# Patient Record
Sex: FEMALE | Race: BLACK OR AFRICAN AMERICAN | Employment: FULL TIME | URBAN - METROPOLITAN AREA
[De-identification: names, ages, dates, MRNs, and addresses within clinical notes are randomized per-mention and may not be internally consistent; named-entity substitution may affect disease eponyms.]

---

## 2017-01-25 RX ORDER — PRAVASTATIN SODIUM 40 MG/1
TABLET ORAL
Qty: 30 TAB | Refills: 0 | Status: SHIPPED | OUTPATIENT
Start: 2017-01-25 | End: 2017-02-24 | Stop reason: SDUPTHER

## 2017-02-24 RX ORDER — PRAVASTATIN SODIUM 40 MG/1
TABLET ORAL
Qty: 30 TAB | Refills: 0 | Status: SHIPPED | OUTPATIENT
Start: 2017-02-24 | End: 2017-04-04 | Stop reason: SDUPTHER

## 2017-02-28 ENCOUNTER — OFFICE VISIT (OUTPATIENT)
Dept: INTERNAL MEDICINE CLINIC | Age: 62
End: 2017-02-28

## 2017-02-28 VITALS
RESPIRATION RATE: 16 BRPM | WEIGHT: 154.2 LBS | BODY MASS INDEX: 30.27 KG/M2 | OXYGEN SATURATION: 98 % | HEIGHT: 60 IN | HEART RATE: 85 BPM | DIASTOLIC BLOOD PRESSURE: 62 MMHG | TEMPERATURE: 97.9 F | SYSTOLIC BLOOD PRESSURE: 100 MMHG

## 2017-02-28 DIAGNOSIS — M25.511 ACUTE PAIN OF RIGHT SHOULDER: ICD-10-CM

## 2017-02-28 DIAGNOSIS — L60.0 INGROWN NAIL OF GREAT TOE OF LEFT FOOT: ICD-10-CM

## 2017-02-28 DIAGNOSIS — L30.9 ECZEMA OF FACE: Primary | ICD-10-CM

## 2017-02-28 DIAGNOSIS — R35.0 URINARY FREQUENCY: ICD-10-CM

## 2017-02-28 DIAGNOSIS — Z23 ENCOUNTER FOR IMMUNIZATION: ICD-10-CM

## 2017-02-28 LAB
BILIRUB UR QL STRIP: NEGATIVE
GLUCOSE UR-MCNC: NEGATIVE MG/DL
KETONES P FAST UR STRIP-MCNC: NEGATIVE MG/DL
PH UR STRIP: 6 [PH] (ref 4.6–8)
PROT UR QL STRIP: NEGATIVE MG/DL
SP GR UR STRIP: 1.03 (ref 1–1.03)
UA UROBILINOGEN AMB POC: NORMAL (ref 0.2–1)
URINALYSIS CLARITY POC: CLEAR
URINALYSIS COLOR POC: YELLOW
URINE BLOOD POC: NEGATIVE
URINE LEUKOCYTES POC: NORMAL
URINE NITRITES POC: NEGATIVE

## 2017-02-28 RX ORDER — DESONIDE 0.5 MG/G
OINTMENT TOPICAL 2 TIMES DAILY
Qty: 15 G | Refills: 0 | Status: SHIPPED | OUTPATIENT
Start: 2017-02-28 | End: 2017-08-19 | Stop reason: SDUPTHER

## 2017-02-28 NOTE — PROGRESS NOTES
Chief Complaint   Patient presents with   Judieth Azalea Other     has a problem with toes having pain shooting thru them. states that it has been going on for about a month. has a breakout on the face   and pain in shoulders that changes from one to the other. Needs labs and has a urinary frequency.

## 2017-02-28 NOTE — MR AVS SNAPSHOT
Visit Information Date & Time Provider Department Dept. Phone Encounter #  
 2/28/2017 11:00 AM Yovani Kennedy MD [facility] Internal Medicine [phone] [id number] Upcoming Health Maintenance Date Due Hepatitis C Screening 1955 EYE EXAM RETINAL OR DILATED Q1 9/12/1966 Pneumococcal 19-64 Medium Risk (1 of 1 - PPSV23) 9/12/1975 DTaP/Tdap/Td series (1 - Tdap) 9/12/1977 PAP AKA CERVICAL CYTOLOGY 11/4/2014 ZOSTER VACCINE AGE 60> 9/12/2016 HEMOGLOBIN A1C Q6M 4/11/2017 FOOT EXAM Q1 10/11/2017 LIPID PANEL Q1 10/11/2017 MICROALBUMIN Q1 10/21/2017 COLONOSCOPY 4/22/2018 BREAST CANCER SCRN MAMMOGRAM 11/23/2018 Allergies as of 2/28/2017  Review Complete On: 2/28/2017 By: Yovani Kennedy MD  
 No Known Allergies Current Immunizations  Reviewed on 2/28/2017 Name Date Influenza Vaccine 10/21/2015 Influenza Vaccine (Quad) PF 2/28/2017 Reviewed by Jose Gomez LPN on 1/75/6799 at 73:40 AM  
You Were Diagnosed With   
  
 Codes Comments Eczema of face    -  Primary ICD-10-CM: L30.9 ICD-9-CM: 692.9 Encounter for immunization     ICD-10-CM: E99 ICD-9-CM: V03.89 Urinary frequency     ICD-10-CM: R35.0 ICD-9-CM: 788.41 Ingrown nail of great toe of left foot     ICD-10-CM: L60.0 ICD-9-CM: 703.0 Acute pain of right shoulder     ICD-10-CM: M25.511 ICD-9-CM: 719.41 Vitals BP  
  
  
  
  
  
 100/62 (BP 1 Location: Left arm, BP Patient Position: Sitting) BMI and BSA Data Body Mass Index Body Surface Area  
 30.12 kg/m 2 1.72 m 2 Preferred Pharmacy Pharmacy Name Phone [facility] [phone] [facility] [phone] Your Updated Medication List  
  
   
This list is accurate as of: 2/28/17 11:52 AM.  Always use your most recent med list.  
  
  
  
  
 Aspirin EC 81 mg tablet Generic drug:  aspirin delayed-release Take 81 mg by mouth daily. Blood-Glucose Meter monitoring kit Check blood sugar 3 times a day. Cholecalciferol (Vitamin D3) 2,000 unit Cap capsule Commonly known as:  VITAMIN D3  
1 tablet daily. desonide 0.05 % topical ointment Commonly known as:  Everlina Mons Apply  to affected area two (2) times a day. glucose blood VI test strips strip Commonly known as:  RELION PRIME TEST STRIPS Use to test blood sugar three times a day. E11.9  
  
 levothyroxine 88 mcg tablet Commonly known as:  SYNTHROID Take 1 Tab by mouth Daily (before breakfast). metFORMIN 500 mg tablet Commonly known as:  GLUCOPHAGE  
TAKE ONE TABLET BY MOUTH TWICE DAILY WITH MEALS  
  
 pravastatin 40 mg tablet Commonly known as:  PRAVACHOL  
TAKE ONE TABLET BY MOUTH ONCE NIGHTLY Prescriptions Sent to Pharmacy Refills  
 desonide (DESOWEN) 0.05 % topical ointment 0 Sig: Apply  to affected area two (2) times a day. Class: Normal  
 Pharmacy: [facility] [place] [address] Ph #: [phone] Route: Topical  
  
We Performed the Following AMB POC URINALYSIS DIP STICK AUTO W/ MICRO [04550 CPT(R)] CULTURE, URINE [id number] CPT(R)] INFLUENZA VIRUS VAC QUAD,SPLIT,PRESV FREE SYRINGE 3/> YRS IM [id number] CPT(R)] REFERRAL TO PODIATRY [REF90 Custom] Comments:  
 Please evaluate patient for ingrowing toe nail. Referral Information Referral ID Referred By Referred To [id number] [address] Visits Status Start Date End Date 1 New Request 2/28/17 2/28/18 If your referral has a status of pending review or denied, additional information will be sent to support the outcome of this decision. Introducing [facility]! Dear Rosita Oconnor: Thank you for requesting a [employer] account.   Our records indicate that you already have an active Face.com account. You can access your account anytime at [email or web address] Did you know that you can access your hospital and ER discharge instructions at any time in Face.com? You can also review all of your test results from your hospital stay or ER visit. Additional Information If you have questions, please visit the Frequently Asked Questions section of the Face.com website at [email or web address] Remember, Face.com is NOT to be used for urgent needs. For medical emergencies, dial 911. Now available from your iPhone and Android! Please provide this summary of care documentation to your next provider. Your primary care clinician is listed as Du Loser. If you have any questions after today's visit, please call [phone].

## 2017-02-28 NOTE — PROGRESS NOTES
Written by Christiano Royal, as dictated by Dr. Lavon Fnoseca MD.    Demarco Hartmann is a 62 y.o. female. HPI  The patient comes in today C/O her face breaking out. She has some rash on her face and she has tried Eucerin for this and it did not help. She has an ingrown toenail on her L greater toe. She has not followed with a podiatrist and it gives her a lot of pain especially at night. She C/O R shoulder pain and she does a lot of lifting with work. She has been using lidocaine cream on her shoulders for pain and it did help her a lot. She has taken Zhen body and back at this time which is also helping her. 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 Her job is good and the stress associated with her job is now gone. Her BP is good today at 100/62. She has not gotten a flu shot yet, and would like a flu shot today. She has urinary odor at this time. She denies any burning sensation with urination. She is not drinking a lot of water at this time. Patient Active Problem List   Diagnosis Code    Hypothyroidism E03.9    Anemia D64.9    Hyperlipidemia E78.5    Left knee pain M25.562    DUB (dysfunctional uterine bleeding) N93.8    [facility] E11.9        Current Outpatient Prescriptions on File Prior to Visit   Medication Sig Dispense Refill    pravastatin (PRAVACHOL) 40 mg tablet TAKE ONE TABLET BY MOUTH ONCE NIGHTLY 30 Tab 0    Cholecalciferol, Vitamin D3, (VITAMIN D3) 2,000 unit cap capsule 1 tablet daily. 90 Cap 0    levothyroxine (SYNTHROID) 88 mcg tablet Take 1 Tab by mouth Daily (before breakfast). 90 Tab 1    metFORMIN (GLUCOPHAGE) 500 mg tablet TAKE ONE TABLET BY MOUTH TWICE DAILY WITH MEALS 60 Tab 3    glucose blood VI test strips (RELION PRIME TEST STRIPS) strip Use to test blood sugar three times a day. E11.9 100 Strip 5    Blood-Glucose Meter monitoring kit Check blood sugar 3 times a day. 1 Kit 0    hydrocortisone 0.5 % lotion Apply  to affected area two (2) times a day. SPARINGLY apply to affected area 1 Bottle 5    aspirin delayed-release (ASPIRIN EC) 81 mg tablet Take 81 mg by mouth daily. No current facility-administered medications on file prior to visit. No Known Allergies    Past Medical History:   Diagnosis Date    Anemia     High cholesterol     Thyroid disease     tyroid nodule removed 1998       Past Surgical History:   Procedure Laterality Date    HX HEENT      THYROIDECTOMY         Family History   Problem Relation Age of Onset    Diabetes Mother     Hypertension Mother     High Cholesterol Mother     Diabetes Sister     Hypertension Sister     Stroke Sister     Diabetes Brother     Hypertension Brother     Diabetes Maternal Grandmother     Heart Disease Maternal Grandmother        Social History     Social History    Marital status: SINGLE     Spouse name: N/A    Number of children: N/A    Years of education: N/A     Occupational History    Not on file.      Social History Main Topics    Smoking status: Current Every Day Smoker     Packs/day: 0.50     Years: 30.00     Types: Cigarettes    Smokeless tobacco: Never Used      Comment: pt smokes 1/2 pack a day    Alcohol use No    Drug use: No    Sexual activity: Yes     Other Topics Concern    Not on file     Social History Narrative       Office Visit on 02/28/2017   Component Date Value Ref Range Status    Color (UA POC) 02/28/2017 Yellow   Final    Clarity (UA POC) 02/28/2017 Clear   Final    Glucose (UA POC) 02/28/2017 Negative  Negative Final    Bilirubin (UA POC) 02/28/2017 Negative  Negative Final    Ketones (UA POC) 02/28/2017 Negative  Negative Final    Specific gravity (UA POC) 02/28/2017 1.030  1.001 - 1.035 Final    Blood (UA POC) 02/28/2017 Negative  Negative Final    pH (UA POC) 02/28/2017 6.0  4.6 - 8.0 Final    Protein (UA POC) 02/28/2017 Negative  Negative mg/dL Final    Urobilinogen (UA POC) 02/28/2017 0.2 mg/dL  0.2 - 1 Final    Nitrites (UA POC) 02/28/2017 Negative  Negative Final    Leukocyte esterase (UA POC) 02/28/2017 1+  Negative Final       Review of Systems   Constitutional: Negative for malaise/fatigue. HENT: Negative for congestion. Respiratory: Negative for cough and wheezing. Cardiovascular: Negative for chest pain and palpitations. Genitourinary: Positive for frequency. Negative for urgency. Musculoskeletal: Positive for joint pain. Negative for myalgias. Skin: Positive for rash. Neurological: Negative for weakness and headaches. Visit Vitals    /62 (BP 1 Location: Left arm, BP Patient Position: Sitting)    Pulse 85    Temp 97.9 °F (36.6 °C) (Oral)    Resp 16    Ht 5' (1.524 m)    Wt 154 lb 3.2 oz (69.9 kg)    LMP 12/04/2012    SpO2 98%    BMI 30.12 kg/m2     Physical Exam   Constitutional: She is oriented to person, place, and time. She appears well-nourished. No distress. HENT:   Right Ear: External ear normal.   Left Ear: External ear normal.   Mouth/Throat: Oropharynx is clear and moist.   Eyes: Conjunctivae and EOM are normal. Right eye exhibits no discharge. Left eye exhibits no discharge. Neck: Normal range of motion. Neck supple. Cardiovascular: Normal rate and regular rhythm. Pulmonary/Chest: Effort normal and breath sounds normal. She has no wheezes. Abdominal: Soft. Bowel sounds are normal. She exhibits no distension. Musculoskeletal: Normal range of motion. She exhibits tenderness. She exhibits no edema. Lymphadenopathy:     She has no cervical adenopathy. Neurological: She is alert and oriented to person, place, and time. Skin: Skin is intact. Rash noted. Dry, raised area on her L cheek and R jaw line   Psychiatric: She has a normal mood and affect. Nursing note and vitals reviewed.       ASSESSMENT and PLAN    ICD-10-CM ICD-9-CM    1. Eczema of face L30.9 692.9 desonide (DESOWEN) 0.05 % topical ointment sent to pharmacy. I discussed that I will give her desonide cream for the eczema of her face. 2. Encounter for immunization Z23 V03.89 INFLUENZA VIRUS VAC QUAD,SPLIT,PRESV FREE SYRINGE 3/> YRS IM   3. Urinary frequency R35.0 788.41 AMB POC URINALYSIS DIP STICK AUTO W/ MICRO      CULTURE, URINE    Her UA shows 1+ leukocytes. I am going to send out her culture and I want her to drink 7-8 glasses of water and a few glasses of cranberry juice. 4. Ingrown nail of great toe of left foot L60.0 703.0 REFERRAL TO PODIATRY   5. Acute pain of right shoulder M25.511 719.41 I told her to continue using the lidocaine cream for her pain and she can alternate aleeve with the myron. This plan was reviewed with the patient and patient agrees. All questions were answered. This scribe documentation was reviewed by me and accurately reflects the examination and decisions made by me. This note will not be viewable in [address]

## 2017-03-24 RX ORDER — INSULIN PUMP SYRINGE, 3 ML
EACH MISCELLANEOUS
Qty: 1 KIT | Refills: 0 | Status: SHIPPED | OUTPATIENT
Start: 2017-03-24 | End: 2017-03-27 | Stop reason: SDUPTHER

## 2017-03-27 RX ORDER — INSULIN PUMP SYRINGE, 3 ML
EACH MISCELLANEOUS
Qty: 1 KIT | Refills: 0 | Status: SHIPPED | OUTPATIENT
Start: 2017-03-27

## 2017-03-27 RX ORDER — LANCETS
EACH MISCELLANEOUS
Qty: 200 EACH | Refills: 11 | Status: SHIPPED | OUTPATIENT
Start: 2017-03-27

## 2017-03-28 ENCOUNTER — OFFICE VISIT (OUTPATIENT)
Dept: INTERNAL MEDICINE CLINIC | Age: 62
End: 2017-03-28

## 2017-03-28 VITALS
HEIGHT: 60 IN | SYSTOLIC BLOOD PRESSURE: 120 MMHG | OXYGEN SATURATION: 92 % | DIASTOLIC BLOOD PRESSURE: 82 MMHG | WEIGHT: 153.8 LBS | BODY MASS INDEX: 30.19 KG/M2 | HEART RATE: 86 BPM | TEMPERATURE: 98.5 F | RESPIRATION RATE: 16 BRPM

## 2017-03-28 DIAGNOSIS — Z13.9 SCREENING: ICD-10-CM

## 2017-03-28 DIAGNOSIS — E78.2 MIXED HYPERLIPIDEMIA: ICD-10-CM

## 2017-03-28 DIAGNOSIS — L30.9 ECZEMA OF FACE: ICD-10-CM

## 2017-03-28 DIAGNOSIS — E03.9 ACQUIRED HYPOTHYROIDISM: ICD-10-CM

## 2017-03-28 DIAGNOSIS — E13.9 DIABETES MELLITUS OF OTHER TYPE WITHOUT COMPLICATION: Primary | ICD-10-CM

## 2017-03-28 NOTE — MR AVS SNAPSHOT
Visit Information Date & Time Provider Department Dept. Phone Encounter #  
 3/28/2017 11:30 AM Wendi Dorman, [address] [facility],[address] Internal Medicine [phone] [id number] Upcoming Health Maintenance Date Due  
 EYE EXAM RETINAL OR DILATED Q1 9/12/1966 Pneumococcal 19-64 Medium Risk (1 of 1 - PPSV23) 9/12/1975 DTaP/Tdap/Td series (1 - Tdap) 9/12/1977 PAP AKA CERVICAL CYTOLOGY 11/4/2014 HEMOGLOBIN A1C Q6M 4/11/2017 FOOT EXAM Q1 10/11/2017 LIPID PANEL Q1 10/11/2017 MICROALBUMIN Q1 10/21/2017 COLONOSCOPY 4/22/2018 BREAST CANCER SCRN MAMMOGRAM 11/23/2018 Allergies as of 3/28/2017  Review Complete On: 3/28/2017 By: Wendi Dorman MD  
 No Known Allergies Current Immunizations  Reviewed on 2/28/2017 Name Date Influenza Vaccine 10/21/2015 Influenza Vaccine (Quad) PF 2/28/2017 Not reviewed this visit You Were Diagnosed With   
  
 Codes Comments Diabetes mellitus of other type without complication ([facility]    -  Primary ICD-10-CM: E13.9 Acquired hypothyroidism     ICD-10-CM: E03.9 ICD-9-CM: 244.9 Screening     ICD-10-CM: Z13.9 ICD-9-CM: V82.9 Mixed hyperlipidemia     ICD-10-CM: E78.2 ICD-9-CM: 272.2 Eczema of face     ICD-10-CM: L30.9 ICD-9-CM: 692.9 Vitals BP Pulse Temp Resp Height(growth percentile) Weight(growth percentile) 120/82 (BP 1 Location: Left arm, BP Patient Position: Sitting) 86 98.5 °F (36.9 °C) (Oral) 16 5' (1.524 m) 153 lb 12.8 oz (69.8 kg) LMP SpO2 BMI OB Status Smoking Status 12/04/2012 92% 30.04 kg/m2 Postmenopausal Current Every Day Smoker BMI and BSA Data Body Mass Index Body Surface Area 30.04 kg/m 2 1.72 m 2 Preferred Pharmacy Pharmacy Name Phone [facility] [phone] [facility] [phone] Your Updated Medication List  
  
   
This list is accurate as of: 3/28/17 12:10 PM.  Always use your most recent med list.  
  
  
  
  
 Aspirin EC 81 mg tablet Generic drug:  aspirin delayed-release Take 81 mg by mouth daily. Blood-Glucose Meter monitoring kit Please use four times a day and as needed. Dx. E11.9 Cholecalciferol (Vitamin D3) 2,000 unit Cap capsule Commonly known as:  VITAMIN D3  
1 tablet daily. desonide 0.05 % topical ointment Commonly known as:  Viona Libman Apply  to affected area two (2) times a day. * glucose blood VI test strips strip Commonly known as:  RELION PRIME TEST STRIPS Use to test blood sugar three times a day. E11.9  
  
 * glucose blood VI test strips strip Commonly known as:  blood glucose test  
Use to test blood sugars 3 times a day and as needed DX. E11.9 Lancets Misc Use to check blood sugars 3 times daily and as needed  
  
 levothyroxine 88 mcg tablet Commonly known as:  SYNTHROID Take 1 Tab by mouth Daily (before breakfast). metFORMIN 500 mg tablet Commonly known as:  GLUCOPHAGE  
TAKE ONE TABLET BY MOUTH TWICE DAILY WITH MEALS  
  
 pravastatin 40 mg tablet Commonly known as:  PRAVACHOL  
TAKE ONE TABLET BY MOUTH ONCE NIGHTLY * Notice: This list has 2 medication(s) that are the same as other medications prescribed for you. Read the directions carefully, and ask your doctor or other care provider to review them with you. We Performed the Following HEMOGLOBIN A1C WITH EAG [20044 CPT(R)] HEPATITIS C AB [96270 CPT(R)] LIPID PANEL [74069 CPT(R)] TSH 3RD GENERATION [21705 CPT(R)] [facility]! Dear Gladys Prakash: Thank you for requesting a [employer] account. Our records indicate that you already have an active [employer] account. You can access your account anytime at [email or web address] Did you know that you can access your hospital and ER discharge instructions at any time in [employer]? You can also review all of your test results from your hospital stay or ER visit. Additional Information If you have questions, please visit the Frequently Asked Questions section of the [employer] website at [email or web address] Remember, Dragon[employer] is NOT to be used for urgent needs. For medical emergencies, dial 911. Now available from your iPhone and Android! Please provide this summary of care documentation to your next provider. Your primary care clinician is listed as Rolando Edwards. If you have any questions after today's visit, please call [phone].

## 2017-03-28 NOTE — PROGRESS NOTES
Written by Justice Jang, as dictated by Dr. Michelle Wilhelm MD.    Heriberto Palmer is a 62 y.o. female. HPI  The patient comes in today for a follow up. She uses the Desonide cream on her eczema rash on her face and it is working very well. She is taking metformin daily. She is taking 88 mcg of synthroid at this time and she thinks it is working well. She is not feeling sluggish or fatigued. She is compliant on pravastatin, and she denies any leg cramps or pain. She needs to follow with the ophthalmologist, and she just got coverage on her vision insurance, so she will make an appointment. Patient Active Problem List   Diagnosis Code    Hypothyroidism E03.9    Anemia D64.9    Hyperlipidemia E78.5    Left knee pain M25.562    DUB (dysfunctional uterine bleeding) N93.8    Diabetes mellitus ([facility] E11.9        Current Outpatient Prescriptions on File Prior to Visit   Medication Sig Dispense Refill    Blood-Glucose Meter monitoring kit Please use four times a day and as needed. Dx. E11.9 1 Kit 0    glucose blood VI test strips (BLOOD GLUCOSE TEST) strip Use to test blood sugars 3 times a day and as needed DX. E11.9 100 Strip 6    Lancets misc Use to check blood sugars 3 times daily and as needed 200 Each 11    desonide (DESOWEN) 0.05 % topical ointment Apply  to affected area two (2) times a day. 15 g 0    pravastatin (PRAVACHOL) 40 mg tablet TAKE ONE TABLET BY MOUTH ONCE NIGHTLY 30 Tab 0    Cholecalciferol, Vitamin D3, (VITAMIN D3) 2,000 unit cap capsule 1 tablet daily. 90 Cap 0    levothyroxine (SYNTHROID) 88 mcg tablet Take 1 Tab by mouth Daily (before breakfast). 90 Tab 1    metFORMIN (GLUCOPHAGE) 500 mg tablet TAKE ONE TABLET BY MOUTH TWICE DAILY WITH MEALS 60 Tab 3    aspirin delayed-release (ASPIRIN EC) 81 mg tablet Take 81 mg by mouth daily.       glucose blood VI test strips (RELION PRIME TEST STRIPS) strip Use to test blood sugar three times a day. E11.9 100 Strip 5     No current facility-administered medications on file prior to visit. No Known Allergies    Past Medical History:   Diagnosis Date    Anemia     High cholesterol     Thyroid disease     tyroid nodule removed 1998       Past Surgical History:   Procedure Laterality Date    HX HEENT      THYROIDECTOMY         Family History   Problem Relation Age of Onset    Diabetes Mother     Hypertension Mother     High Cholesterol Mother     Diabetes Sister     Hypertension Sister     Stroke Sister     Diabetes Brother     Hypertension Brother     Diabetes Maternal Grandmother     Heart Disease Maternal Grandmother        Social History     Social History    Marital status: SINGLE     Spouse name: N/A    Number of children: N/A    Years of education: N/A     Occupational History    Not on file.      Social History Main Topics    Smoking status: Current Every Day Smoker     Packs/day: 0.50     Years: 30.00     Types: Cigarettes    Smokeless tobacco: Never Used      Comment: pt smokes 1/2 pack a day    Alcohol use No    Drug use: No    Sexual activity: Yes     Other Topics Concern    Not on file     Social History Narrative       Office Visit on 02/28/2017   Component Date Value Ref Range Status    Color (UA POC) 02/28/2017 Yellow   Final    Clarity (UA POC) 02/28/2017 Clear   Final    Glucose (UA POC) 02/28/2017 Negative  Negative Final    Bilirubin (UA POC) 02/28/2017 Negative  Negative Final    Ketones (UA POC) 02/28/2017 Negative  Negative Final    Specific gravity (UA POC) 02/28/2017 1.030  1.001 - 1.035 Final    Blood (UA POC) 02/28/2017 Negative  Negative Final    pH (UA POC) 02/28/2017 6.0  4.6 - 8.0 Final    Protein (UA POC) 02/28/2017 Negative  Negative mg/dL Final    Urobilinogen (UA POC) 02/28/2017 0.2 mg/dL  0.2 - 1 Final    Nitrites (UA POC) 02/28/2017 Negative  Negative Final    Leukocyte esterase (UA POC) 02/28/2017 1+  Negative Final       Review of Systems   Constitutional: Negative for malaise/fatigue. Respiratory: Negative for cough and wheezing. Cardiovascular: Negative for chest pain and palpitations. Musculoskeletal: Negative for joint pain and myalgias. Skin: Positive for rash. Neurological: Negative for weakness and headaches. Visit Vitals    /82 (BP 1 Location: Left arm, BP Patient Position: Sitting)    Pulse 86    Temp 98.5 °F (36.9 °C) (Oral)    Resp 16    Ht 5' (1.524 m)    Wt 153 lb 12.8 oz (69.8 kg)    LMP 12/04/2012    SpO2 92%    BMI 30.04 kg/m2     Physical Exam   Constitutional: She is oriented to person, place, and time. She appears well-nourished. No distress. HENT:   Right Ear: External ear normal.   Left Ear: External ear normal.   Mouth/Throat: Oropharynx is clear and moist.   Eyes: Conjunctivae and EOM are normal. Right eye exhibits no discharge. Left eye exhibits no discharge. Neck: Normal range of motion. Neck supple. Cardiovascular: Normal rate and regular rhythm. Pulmonary/Chest: Effort normal and breath sounds normal.   Abdominal: Soft. Bowel sounds are normal.   Neurological: She is alert and oriented to person, place, and time. Skin: Skin is intact. Psychiatric: She has a normal mood and affect. Nursing note and vitals reviewed. ASSESSMENT and PLAN    ICD-10-CM ICD-9-CM    1. Diabetes mellitus of other type without complication ([facility] P52.6  HEMOGLOBIN A1C WITH EAG    I discussed that we will check her A1c and I want her to follow with the ophthalmologist to have a diabetic eye exam.   2. Acquired hypothyroidism E03.9 244.9 TSH 3RD GENERATION    I increased her synthroid on her last visit, so I will check her TSH levels. 3. Screening Z13.9 V82.9 HEPATITIS C AB   4. Mixed hyperlipidemia E78.2 272.2 LIPID PANEL   5. Eczema of face L30.9 692.9 The desonide cream is working well for her, so she can use this as needed.        This plan was reviewed with the patient and patient agrees. All questions were answered. This scribe documentation was reviewed by me and accurately reflects the examination and decisions made by me. This note will not be viewable in [address]

## 2017-03-29 LAB
CHOLEST SERPL-MCNC: 165 MG/DL (ref 100–199)
EST. AVERAGE GLUCOSE BLD GHB EST-MCNC: 131 MG/DL
HBA1C MFR BLD: 6.2 % (ref 4.8–5.6)
HCV AB S/CO SERPL IA: 0.2 S/CO RATIO (ref 0–0.9)
HDLC SERPL-MCNC: 66 MG/DL
INTERPRETATION, 910389: NORMAL
LDLC SERPL CALC-MCNC: 74 MG/DL (ref 0–99)
TRIGL SERPL-MCNC: 124 MG/DL (ref 0–149)
TSH SERPL DL<=0.005 MIU/L-ACNC: 0.06 UIU/ML (ref 0.45–4.5)
VLDLC SERPL CALC-MCNC: 25 MG/DL (ref 5–40)

## 2017-03-30 NOTE — PROGRESS NOTES
Her HbA1C is improving. Keep up the good work! TSH is still little low. Will repeat in 3 months. Continue same dose for now.

## 2017-04-04 RX ORDER — METFORMIN HYDROCHLORIDE 500 MG/1
TABLET ORAL
Qty: 60 TAB | Refills: 0 | Status: SHIPPED | OUTPATIENT
Start: 2017-04-04 | End: 2017-05-31 | Stop reason: SDUPTHER

## 2017-04-04 RX ORDER — PRAVASTATIN SODIUM 40 MG/1
TABLET ORAL
Qty: 30 TAB | Refills: 0 | Status: SHIPPED | OUTPATIENT
Start: 2017-04-04 | End: 2017-05-05 | Stop reason: SDUPTHER

## 2017-05-31 RX ORDER — METFORMIN HYDROCHLORIDE 500 MG/1
TABLET ORAL
Qty: 60 TAB | Refills: 0 | Status: SHIPPED | OUTPATIENT
Start: 2017-05-31 | End: 2017-07-18 | Stop reason: SDUPTHER

## 2017-07-19 RX ORDER — METFORMIN HYDROCHLORIDE 500 MG/1
TABLET ORAL
Qty: 60 TAB | Refills: 0 | Status: SHIPPED | OUTPATIENT
Start: 2017-07-19 | End: 2017-09-06 | Stop reason: SDUPTHER

## 2017-08-17 DIAGNOSIS — E03.9 ACQUIRED HYPOTHYROIDISM: ICD-10-CM

## 2017-08-17 RX ORDER — PRAVASTATIN SODIUM 40 MG/1
TABLET ORAL
Qty: 90 TAB | Refills: 0 | Status: SHIPPED | OUTPATIENT
Start: 2017-08-17 | End: 2017-12-04 | Stop reason: SDUPTHER

## 2017-08-17 RX ORDER — LEVOTHYROXINE SODIUM 88 UG/1
TABLET ORAL
Qty: 90 TAB | Refills: 0 | Status: SHIPPED | OUTPATIENT
Start: 2017-08-17 | End: 2018-03-15 | Stop reason: SDUPTHER

## 2017-08-19 DIAGNOSIS — L30.9 ECZEMA OF FACE: ICD-10-CM

## 2017-08-20 RX ORDER — DESONIDE 0.5 MG/G
OINTMENT TOPICAL
Qty: 1 TUBE | Refills: 1 | Status: SHIPPED | OUTPATIENT
Start: 2017-08-20 | End: 2017-11-18

## 2017-09-08 RX ORDER — METFORMIN HYDROCHLORIDE 500 MG/1
TABLET ORAL
Qty: 60 TAB | Refills: 0 | Status: SHIPPED | OUTPATIENT
Start: 2017-09-08 | End: 2017-11-05 | Stop reason: SDUPTHER

## 2017-11-03 DIAGNOSIS — R79.89 ELEVATED TSH: ICD-10-CM

## 2017-11-03 DIAGNOSIS — L75.0 URINARY BODY ODOR: ICD-10-CM

## 2017-11-03 DIAGNOSIS — R73.9 ELEVATED BLOOD SUGAR: Primary | ICD-10-CM

## 2017-11-03 LAB
BILIRUB UR QL STRIP: NEGATIVE
GLUCOSE UR-MCNC: NEGATIVE MG/DL
KETONES P FAST UR STRIP-MCNC: NEGATIVE MG/DL
PH UR STRIP: 5.5 [PH] (ref 4.6–8)
PROT UR QL STRIP: NEGATIVE MG/DL
SP GR UR STRIP: 1.02 (ref 1–1.03)
UA UROBILINOGEN AMB POC: NORMAL (ref 0.2–1)
URINALYSIS CLARITY POC: NORMAL
URINALYSIS COLOR POC: YELLOW
URINE BLOOD POC: NORMAL
URINE LEUKOCYTES POC: NORMAL
URINE NITRITES POC: NEGATIVE

## 2017-11-04 LAB
BACTERIA UR CULT: NO GROWTH
EST. AVERAGE GLUCOSE BLD GHB EST-MCNC: 131 MG/DL
HBA1C MFR BLD: 6.2 % (ref 4.8–5.6)
TSH SERPL DL<=0.005 MIU/L-ACNC: 0.01 UIU/ML (ref 0.45–4.5)

## 2017-11-06 DIAGNOSIS — E03.9 ACQUIRED HYPOTHYROIDISM: Primary | ICD-10-CM

## 2017-11-06 RX ORDER — LEVOTHYROXINE SODIUM 75 UG/1
75 TABLET ORAL
Qty: 60 TAB | Refills: 0 | Status: SHIPPED | OUTPATIENT
Start: 2017-11-06 | End: 2017-12-28 | Stop reason: SDUPTHER

## 2017-11-06 RX ORDER — METFORMIN HYDROCHLORIDE 500 MG/1
TABLET ORAL
Qty: 60 TAB | Refills: 0 | Status: SHIPPED | OUTPATIENT
Start: 2017-11-06 | End: 2017-12-19 | Stop reason: SDUPTHER

## 2017-12-04 RX ORDER — PRAVASTATIN SODIUM 40 MG/1
TABLET ORAL
Qty: 30 TAB | Refills: 0 | Status: SHIPPED | OUTPATIENT
Start: 2017-12-04 | End: 2017-12-28 | Stop reason: SDUPTHER

## 2017-12-04 NOTE — TELEPHONE ENCOUNTER
Last refill 8/17/17  Last visit 3/28/17  Appears pt has missed two scheduled appointment (11/3/17 and 11/9/17)  Placed refill for only a 30 day supply bc pt needs to have an appt to repeat Lipid prior to next refill.

## 2017-12-04 NOTE — TELEPHONE ENCOUNTER
Pt says that she has been waiting for two weeks?   This writer believes we might be having CC issues

## 2018-03-12 ENCOUNTER — OFFICE VISIT (OUTPATIENT)
Dept: INTERNAL MEDICINE CLINIC | Age: 63
End: 2018-03-12

## 2018-03-12 VITALS
DIASTOLIC BLOOD PRESSURE: 90 MMHG | RESPIRATION RATE: 16 BRPM | OXYGEN SATURATION: 93 % | HEART RATE: 95 BPM | HEIGHT: 60 IN | TEMPERATURE: 98 F | WEIGHT: 156 LBS | BODY MASS INDEX: 30.63 KG/M2 | SYSTOLIC BLOOD PRESSURE: 145 MMHG

## 2018-03-12 DIAGNOSIS — E03.9 ACQUIRED HYPOTHYROIDISM: ICD-10-CM

## 2018-03-12 DIAGNOSIS — E13.9 OTHER SPECIFIED DIABETES MELLITUS WITHOUT COMPLICATION, WITHOUT LONG-TERM CURRENT USE OF INSULIN (HCC): ICD-10-CM

## 2018-03-12 DIAGNOSIS — M79.89 LEG SWELLING: ICD-10-CM

## 2018-03-12 DIAGNOSIS — I10 ESSENTIAL HYPERTENSION: Primary | ICD-10-CM

## 2018-03-12 DIAGNOSIS — N89.8 VAGINAL DISCHARGE: ICD-10-CM

## 2018-03-12 NOTE — PROGRESS NOTES
Jessie Pichardo is a 65 y.o. female  Chief Complaint   Patient presents with    Leg Swelling     left side midshin area 3-4 days some dizziness    Vaginal Itching     very little discharge for about 3 weeks    Breast pain     moves around to shoulder blade left side only radiates down side pain comes and goes     Visit Vitals    /90 (BP 1 Location: Left arm, BP Patient Position: Sitting)    Pulse 95    Temp 98 °F (36.7 °C)    Resp 16    Ht 5' (1.524 m)    Wt 156 lb (70.8 kg)    LMP 12/04/2012    SpO2 93%    BMI 30.47 kg/m2         Taking care of sister now. 1. Have you been to the ER, urgent care clinic since your last visit? Hospitalized since your last visit? no    2. Have you seen or consulted any other health care providers outside of the [address] [place] [address] since your last visit? Include any pap smears or colon screening.  no

## 2018-03-12 NOTE — MR AVS SNAPSHOT
[address] [facility] [address] [place] [address] [facility] 
[phone] Patient: Nakita Peacock MRN: [id number] Visit Information Date & Time Provider Department Dept. Phone Encounter #  
 3/12/2018 12:15 PM Soraya Quarles MD [facility] [phone] [id number] Upcoming Health Maintenance Date Due  
 EYE EXAM RETINAL OR DILATED Q1 9/12/1966 Pneumococcal 19-64 Medium Risk (1 of 1 - PPSV23) 9/12/1975 DTaP/Tdap/Td series (1 - Tdap) 9/12/1977 PAP AKA CERVICAL CYTOLOGY 11/4/2014 FOOT EXAM Q1 10/11/2017 MICROALBUMIN Q1 10/21/2017 LIPID PANEL Q1 3/28/2018 COLONOSCOPY 4/22/2018 HEMOGLOBIN A1C Q6M 5/3/2018 BREAST CANCER SCRN MAMMOGRAM 11/23/2018 Allergies as of 3/12/2018  Review Complete On: 3/12/2018 By: Soraya Quarles MD  
 No Known Allergies Current Immunizations  Reviewed on 2/28/2017 Name Date Influenza Vaccine 10/21/2015 Influenza Vaccine (Quad) PF 2/28/2017 Not reviewed this visit You Were Diagnosed With   
  
 Codes Comments Essential hypertension    -  Primary ICD-10-CM: I10 
ICD-9-CM: 401.9 Acquired hypothyroidism     ICD-10-CM: E03.9 ICD-9-CM: 244.9 Leg swelling     ICD-10-CM: M79.89 ICD-9-CM: 729.81 Other specified diabetes mellitus without complication, without long-term current use of insulin ([facility]     ICD-10-CM: E13.9 ICD-9-CM: 250.00 Vaginal discharge     ICD-10-CM: N89.8 ICD-9-CM: 623.5 Vitals BP Pulse Temp Resp Height(growth percentile) Weight(growth percentile) 145/90 (BP 1 Location: Left arm, BP Patient Position: Sitting) 95 98 °F (36.7 °C) 16 5' (1.524 m) 156 lb (70.8 kg) LMP SpO2 BMI OB Status Smoking Status 12/04/2012 93% 30.47 kg/m2 Postmenopausal Current Every Day Smoker Vitals History BMI and BSA Data Body Mass Index Body Surface Area  
 30.47 kg/m 2 1.73 m 2 Preferred Pharmacy Pharmacy Name Phone [facility] [address] [place] [address], [address] [place] [address] Rd. [phone] Your Updated Medication List  
  
   
This list is accurate as of 3/12/18  1:19 PM.  Always use your most recent med list.  
  
  
  
  
 Aspirin EC 81 mg tablet Generic drug:  aspirin delayed-release Take 81 mg by mouth daily. Blood-Glucose Meter monitoring kit Please use four times a day and as needed. Dx. E11.9 Cholecalciferol (Vitamin D3) 2,000 unit Cap capsule Commonly known as:  VITAMIN D3  
1 tablet daily. * glucose blood VI test strips strip Commonly known as:  RELION PRIME TEST STRIPS Use to test blood sugar three times a day. E11.9  
  
 * glucose blood VI test strips strip Commonly known as:  blood glucose test  
Use to test blood sugars 3 times a day and as needed DX. E11.9 Lancets Misc Use to check blood sugars 3 times daily and as needed  
  
 levothyroxine 88 mcg tablet Commonly known as:  SYNTHROID  
TAKE ONE TABLET BY MOUTH ONCE DAILY BEFORE BREAKFAST  
  
 metFORMIN 500 mg tablet Commonly known as:  GLUCOPHAGE  
TAKE ONE TABLET BY MOUTH TWICE DAILY WITH MEALS  
  
 pravastatin 40 mg tablet Commonly known as:  PRAVACHOL  
TAKE ONE TABLET BY MOUTH ONCE NIGHTLY * Notice: This list has 2 medication(s) that are the same as other medications prescribed for you. Read the directions carefully, and ask your doctor or other care provider to review them with you. We Performed the Following BV+CT+NG+TV BY LISA + YEAST [[id number] Custom] CBC W/O DIFF [95863 CPT(R)] HEMOGLOBIN A1C WITH EAG [67946 CPT(R)] LIPID PANEL [21076 CPT(R)] METABOLIC PANEL, COMPREHENSIVE [39563 CPT(R)] TSH 3RD GENERATION [98619 CPT(R)] Introducing [facility]! Dear Stevie [place]: Thank you for requesting a [facility] account. Our records indicate that you already have an active [facility] account.   You can access your account anytime at [email or web address] Did you know that you can access your hospital and ER discharge instructions at any time in [employer]? You can also review all of your test results from your hospital stay or ER visit. Additional Information If you have questions, please visit the Frequently Asked Questions section of the [employer] website at [email or web address] Remember, [employer] is NOT to be used for urgent needs. For medical emergencies, dial 911. Now available from your iPhone and Android! Please provide this summary of care documentation to your next provider. Your primary care clinician is listed as Daphnie Grande. If you have any questions after today's visit, please call [phone].

## 2018-03-12 NOTE — PROGRESS NOTES
Written by Adelina Brown, as dictated by Dr. Natacha Cerda MD.    Phil Barrientos is a 65 y.o. female. HPI  The patient comes in today c/o L leg swelling. Her BP is high today at 145/90, but she says her BP has been running fine at home. She has been drinking more alcohol than normal lately because she is so stressed due to issues with her family and she is not able to rest much. She has also noticed a vaginal odor lately, which started a short while after a \"sexual encounter\" that she had a month ago. She tried showering 3-4 times per day, but the odor did not go away. She has started taking Azo, which helped somewhat. She denies any vaginal itching, burning, or unusual discharge. She has been drinking moderate amounts of water. She denies urinary frequency or dysuria. She also c/o BL breast discomfort which comes and goes. She has taken Zhen body & back 500 mg, but her daughter was concerned she was \"addicted\" to it so she has not been taking it much. She has also noticed her breasts have been getting bigger. She is currently taking Synthroid 88 mcg, metformin 500 mg BID, and Pravachol 40 mg. She ate two crackers this morning because her BS was dropping, but is otherwise fasted and would like to repeat labs today.     Patient Active Problem List   Diagnosis Code    Hypothyroidism E03.9    Anemia D64.9    Hyperlipidemia E78.5    Left knee pain M25.562    DUB (dysfunctional uterine bleeding) N93.8    Diabetes mellitus ([facility] E11.9        Current Outpatient Prescriptions on File Prior to Visit   Medication Sig Dispense Refill    pravastatin (PRAVACHOL) 40 mg tablet TAKE ONE TABLET BY MOUTH ONCE NIGHTLY 90 Tab 0    metFORMIN (GLUCOPHAGE) 500 mg tablet TAKE ONE TABLET BY MOUTH TWICE DAILY WITH MEALS 180 Tab 0    levothyroxine (SYNTHROID) 88 mcg tablet TAKE ONE TABLET BY MOUTH ONCE DAILY BEFORE BREAKFAST 90 Tab 0    Blood-Glucose Meter monitoring kit Please use four times a day and as needed. Dx. E11.9 1 Kit 0    glucose blood VI test strips (BLOOD GLUCOSE TEST) strip Use to test blood sugars 3 times a day and as needed DX. E11.9 100 Strip 6    Lancets misc Use to check blood sugars 3 times daily and as needed 200 Each 11    glucose blood VI test strips (RELION PRIME TEST STRIPS) strip Use to test blood sugar three times a day. E11.9 100 Strip 5    aspirin delayed-release (ASPIRIN EC) 81 mg tablet Take 81 mg by mouth daily.  Cholecalciferol, Vitamin D3, (VITAMIN D3) 2,000 unit cap capsule 1 tablet daily. 90 Cap 0     No current facility-administered medications on file prior to visit. Past Medical History:   Diagnosis Date    Anemia     High cholesterol     Thyroid disease     tyroid nodule removed 1998       Past Surgical History:   Procedure Laterality Date    HX HEENT      THYROIDECTOMY         Family History   Problem Relation Age of Onset    Diabetes Mother     Hypertension Mother     High Cholesterol Mother     Diabetes Sister     Hypertension Sister     Stroke Sister     Diabetes Brother     Hypertension Brother     Diabetes Maternal Grandmother     Heart Disease Maternal Grandmother        Social History     Social History    Marital status: SINGLE     Spouse name: N/A    Number of children: N/A    Years of education: N/A     Occupational History    Not on file. Social History Main Topics    Smoking status: Current Every Day Smoker     Packs/day: 0.50     Years: 30.00     Types: Cigarettes    Smokeless tobacco: Never Used      Comment: pt smokes 1/2 pack a day    Alcohol use No    Drug use: No    Sexual activity: Yes     Other Topics Concern    Not on file     Social History Narrative       Review of Systems   Constitutional: Negative for malaise/fatigue. HENT: Negative for congestion. Respiratory: Negative for cough and shortness of breath. Cardiovascular: Positive for leg swelling.  Negative for chest pain and palpitations. Genitourinary: Negative for frequency and urgency. Musculoskeletal: Negative for joint pain and myalgias. Neurological: Negative for dizziness, tingling, sensory change, weakness and headaches. Psychiatric/Behavioral: Negative for depression, memory loss and substance abuse. The patient is nervous/anxious. Visit Vitals    /90 (BP 1 Location: Left arm, BP Patient Position: Sitting)    Pulse 95    Temp 98 °F (36.7 °C)    Resp 16    Ht 5' (1.524 m)    Wt 156 lb (70.8 kg)    LMP 12/04/2012    SpO2 93%    BMI 30.47 kg/m2       Physical Exam   Constitutional: She is oriented to person, place, and time. She appears well-developed. No distress. Obese   HENT:   Right Ear: External ear normal.   Left Ear: External ear normal.   Eyes: Conjunctivae and EOM are normal. Right eye exhibits no discharge. Left eye exhibits no discharge. Neck: Normal range of motion. Neck supple. Cardiovascular: Normal rate and regular rhythm. Pulmonary/Chest: Effort normal and breath sounds normal. She has no wheezes. Abdominal: Soft. Bowel sounds are normal. There is no tenderness. Musculoskeletal: She exhibits tenderness. Bruise on L lower leg, tenderness on palpation   Lymphadenopathy:     She has no cervical adenopathy. Neurological: She is alert and oriented to person, place, and time. Skin: She is not diaphoretic. Psychiatric: She has a normal mood and affect. Her behavior is normal.   Nursing note and vitals reviewed. ASSESSMENT and PLAN    ICD-10-CM ICD-9-CM    1. Essential hypertension I10 401.9 CBC W/O DIFF      METABOLIC PANEL, COMPREHENSIVE    She does not think her BP has been running high at home, and thinks her BP is high today because she drank alcohol last night and has been very stressed. She should check her BP and send me the readings, and RTC if her readings are consistently high. Will repeat basic labs today.    2. Acquired hypothyroidism E03.9 244.9 TSH 3RD GENERATION    Compliant on Synthroid. Will repeat TSH today. 3. Leg swelling M79.89 729.81 Discussed this is a bruise and should heal on its own. 4. Other specified diabetes mellitus without complication, without long-term current use of insulin (HCC) E13.9 250.00 HEMOGLOBIN A1C WITH EAG    Compliant on metformin. Will repeat HA1c today. LIPID PANEL    Compliant on Pravachol. Will recheck cholesterol today. 5. Vaginal discharge N89.8 623.5 BV+CT+NG+TV BY LISA + YEAST    Will check for vaginal infection today. Recommended Motrin 600 mg po bid with meals for next 3 days for Breast pain. This plan was reviewed with the patient and patient agrees. All questions were answered. This scribe documentation was reviewed by me and accurately reflects the examination and decisions made by me. This note will not be viewable in [address]

## 2018-03-13 LAB
ALBUMIN SERPL-MCNC: 4.9 G/DL (ref 3.6–4.8)
ALBUMIN/GLOB SERPL: 1.8 {RATIO} (ref 1.2–2.2)
ALP SERPL-CCNC: 87 IU/L (ref 39–117)
ALT SERPL-CCNC: 14 IU/L (ref 0–32)
AST SERPL-CCNC: 15 IU/L (ref 0–40)
BILIRUB SERPL-MCNC: 0.9 MG/DL (ref 0–1.2)
BUN SERPL-MCNC: 10 MG/DL (ref 8–27)
BUN/CREAT SERPL: 12 (ref 12–28)
CALCIUM SERPL-MCNC: 9.8 MG/DL (ref 8.7–10.3)
CHLORIDE SERPL-SCNC: 99 MMOL/L (ref 96–106)
CHOLEST SERPL-MCNC: 161 MG/DL (ref 100–199)
CO2 SERPL-SCNC: 26 MMOL/L (ref 18–29)
CREAT SERPL-MCNC: 0.84 MG/DL (ref 0.57–1)
ERYTHROCYTE [DISTWIDTH] IN BLOOD BY AUTOMATED COUNT: 14 % (ref 12.3–15.4)
EST. AVERAGE GLUCOSE BLD GHB EST-MCNC: 131 MG/DL
GFR SERPLBLD CREATININE-BSD FMLA CKD-EPI: 75 ML/MIN/1.73
GFR SERPLBLD CREATININE-BSD FMLA CKD-EPI: 87 ML/MIN/1.73
GLOBULIN SER CALC-MCNC: 2.8 G/DL (ref 1.5–4.5)
GLUCOSE SERPL-MCNC: 99 MG/DL (ref 65–99)
HBA1C MFR BLD: 6.2 % (ref 4.8–5.6)
HCT VFR BLD AUTO: 43.7 % (ref 34–46.6)
HDLC SERPL-MCNC: 67 MG/DL
HGB BLD-MCNC: 14.6 G/DL (ref 11.1–15.9)
INTERPRETATION, 910389: NORMAL
LDLC SERPL CALC-MCNC: 76 MG/DL (ref 0–99)
MCH RBC QN AUTO: 30.6 PG (ref 26.6–33)
MCHC RBC AUTO-ENTMCNC: 33.4 G/DL (ref 31.5–35.7)
MCV RBC AUTO: 92 FL (ref 79–97)
PLATELET # BLD AUTO: 294 X10E3/UL (ref 150–379)
POTASSIUM SERPL-SCNC: 4.4 MMOL/L (ref 3.5–5.2)
PROT SERPL-MCNC: 7.7 G/DL (ref 6–8.5)
RBC # BLD AUTO: 4.77 X10E6/UL (ref 3.77–5.28)
SODIUM SERPL-SCNC: 141 MMOL/L (ref 134–144)
TRIGL SERPL-MCNC: 91 MG/DL (ref 0–149)
TSH SERPL DL<=0.005 MIU/L-ACNC: 0.91 UIU/ML (ref 0.45–4.5)
VLDLC SERPL CALC-MCNC: 18 MG/DL (ref 5–40)
WBC # BLD AUTO: 5 X10E3/UL (ref 3.4–10.8)

## 2018-03-15 DIAGNOSIS — E03.9 ACQUIRED HYPOTHYROIDISM: ICD-10-CM

## 2018-03-15 RX ORDER — LEVOTHYROXINE SODIUM 88 UG/1
TABLET ORAL
Qty: 90 TAB | Refills: 0 | Status: SHIPPED | OUTPATIENT
Start: 2018-03-15 | End: 2018-06-27 | Stop reason: SDUPTHER

## 2018-03-15 NOTE — TELEPHONE ENCOUNTER
Last Refill:8/17/17  Last Lab:3/12/18  Last Ov:3/12/18    Pharmacy: [address] [facility]

## 2018-03-15 NOTE — PROGRESS NOTES
Ercell, your thyroid levels came back normal. Continue same dose synthroid. Diabetes number almost same. Do you need refill for Metformin?

## 2018-03-16 LAB
BACTERIAL SIALIDASE SPEC QL: ABNORMAL
C TRACH RRNA SPEC QL NAA+PROBE: NEGATIVE
N GONORRHOEA RRNA SPEC QL NAA+PROBE: NEGATIVE
T VAGINALIS RRNA SPEC QL NAA+PROBE: POSITIVE
YEAST GENITAL QL CULT: NEGATIVE

## 2018-03-19 NOTE — PROGRESS NOTES
Thomas Piedra, if your discharge has gone by azole and you are not having any symptoms  then no need to take Flagyl.

## 2018-03-22 LAB
A VAGINAE DNA VAG QL NAA+PROBE: ABNORMAL SCORE
BVAB2 DNA VAG QL NAA+PROBE: ABNORMAL SCORE
MEGA1 DNA VAG QL NAA+PROBE: ABNORMAL SCORE
SPECIMEN STATUS REPORT, ROLRST: NORMAL

## 2018-03-28 DIAGNOSIS — E78.00 HYPERCHOLESTEROLEMIA: Primary | ICD-10-CM

## 2018-03-28 RX ORDER — PRAVASTATIN SODIUM 40 MG/1
TABLET ORAL
Qty: 90 TAB | Refills: 0 | Status: SHIPPED | OUTPATIENT
Start: 2018-03-28 | End: 2018-06-27 | Stop reason: SDUPTHER

## 2018-04-09 RX ORDER — METFORMIN HYDROCHLORIDE 500 MG/1
TABLET ORAL
Qty: 180 TAB | Refills: 0 | Status: SHIPPED | OUTPATIENT
Start: 2018-04-09 | End: 2018-08-20 | Stop reason: SDUPTHER

## 2018-05-02 DIAGNOSIS — N89.8 VAGINAL DISCHARGE: Primary | ICD-10-CM

## 2018-05-02 RX ORDER — FLUCONAZOLE 150 MG/1
150 TABLET ORAL DAILY
Qty: 3 TAB | Refills: 0 | Status: SHIPPED | OUTPATIENT
Start: 2018-05-02 | End: 2018-10-03 | Stop reason: SDUPTHER

## 2018-06-27 DIAGNOSIS — E03.9 ACQUIRED HYPOTHYROIDISM: ICD-10-CM

## 2018-06-27 DIAGNOSIS — E78.00 HYPERCHOLESTEROLEMIA: ICD-10-CM

## 2018-06-27 RX ORDER — LEVOTHYROXINE SODIUM 88 UG/1
TABLET ORAL
Qty: 90 TAB | Refills: 0 | Status: SHIPPED | OUTPATIENT
Start: 2018-06-27 | End: 2018-08-22 | Stop reason: SDUPTHER

## 2018-06-27 RX ORDER — LEVOTHYROXINE SODIUM 88 UG/1
TABLET ORAL
Qty: 90 TAB | Refills: 0 | Status: SHIPPED | OUTPATIENT
Start: 2018-06-27 | End: 2018-09-24 | Stop reason: SDUPTHER

## 2018-06-27 RX ORDER — PRAVASTATIN SODIUM 40 MG/1
TABLET ORAL
Qty: 90 TAB | Refills: 0 | Status: SHIPPED | OUTPATIENT
Start: 2018-06-27 | End: 2019-05-14 | Stop reason: SDUPTHER

## 2018-08-21 RX ORDER — METFORMIN HYDROCHLORIDE 500 MG/1
500 TABLET ORAL 2 TIMES DAILY WITH MEALS
Qty: 180 TAB | Refills: 0 | Status: SHIPPED | OUTPATIENT
Start: 2018-08-21 | End: 2018-09-24 | Stop reason: SDUPTHER

## 2018-08-22 ENCOUNTER — OFFICE VISIT (OUTPATIENT)
Dept: FAMILY PLANNING/WOMEN'S HEALTH CLINIC | Age: 63
End: 2018-08-22

## 2018-08-22 VITALS — SYSTOLIC BLOOD PRESSURE: 139 MMHG | DIASTOLIC BLOOD PRESSURE: 96 MMHG

## 2018-08-22 DIAGNOSIS — Z13.9 ENCOUNTER FOR SCREENING: Primary | ICD-10-CM

## 2018-08-22 NOTE — PROGRESS NOTES
EVERY WOMANS LIFE HISTORY QUESTIONNAIRE       No Yes Comments   Has a doctor ever seen or felt anything wrong with your breast? [x]                                  []                                     Have you ever had a breast biopsy? [x]                                  []                                          When and where was last mammogram performed? 2016 mammogram    Have you ever been told that there was a problem on your mammogram?   No Yes Comments   [x]                                  []                                       Do you have breast implants? No Yes Comments   [x]                                  []                                       When was your last Pap test performed? 2014    Have you ever had an abnormal Pap test?   No Yes Comments   [x]                                  []                                       Have you had a hysterectomy? No Yes Comments (why)   [x]                                  []                                       Have you ever been diagnosed with any type of Cancer   No Yes Comments (type,when,where,type of treatment   [x]                                  []                                          Has a family member been diagnosed with breast or ovarian cancer? No Yes Comments (which family members, and type   [x]                                  []                                       Did your mother take DRU? No Yes Unknown   []                                  []                                  X     Do you have a history of HIV exposure? No Yes    [x]                                  []                                         Have you been through menopause?    No Yes Date of LMP   []                                  [x]                                  2014     Are you taking hormone replacement therapy (HRT)     No Yes Comments   [x]                                  []                                       How many times have you been pregnant? 3       Number of live births ? 2    Are you experiencing any of the following? No Yes Comments   Nipple Discharge [x]                                  []                                     Breast Lump/Masses [x]                                  []                                     Breast Skin Changes [x]                                  []                                          No Yes Comments   Vaginal Discharge [x]                                  []                                     Abnormal/unusual vaginal bleeding [x]                                  []                                         Are you experiencing any other health problems?   Hypo thyroidism, high cholesterol, diabetes

## 2018-08-22 NOTE — PROGRESS NOTES
Patient had to leave before physician breast exam and mammogram today  She was previously scheduled for pap smear on 9/17/18 and pt decided to come back that day for breast and cervical screening.   Nurse intake done

## 2018-09-17 ENCOUNTER — HOSPITAL ENCOUNTER (OUTPATIENT)
Dept: LAB | Age: 63
Discharge: HOME | End: 2018-09-17

## 2018-09-17 PROCEDURE — 88175 CYTOPATH C/V AUTO FLUID REDO: CPT | Performed by: PHYSICIAN ASSISTANT

## 2018-09-19 DIAGNOSIS — A59.9 TRICHOMONAS VAGINALIS INFECTION: Primary | ICD-10-CM

## 2018-09-19 RX ORDER — METRONIDAZOLE 500 MG/1
2000 TABLET ORAL ONCE
Qty: 4 TAB | Refills: 0 | Status: SHIPPED | OUTPATIENT
Start: 2018-09-19 | End: 2018-09-19

## 2018-09-19 NOTE — PROGRESS NOTES
She has trich on pap. She needs to take abx as well as her partner. Her last pap had trich + in 2011. Both partners need to be treated in order to eradicate the infection. I will call in rx for flagyl. No ETOH with the medication.  Thank you for calling her and letting her know

## 2018-09-21 ENCOUNTER — OFFICE VISIT (OUTPATIENT)
Dept: PRIMARY CARE CLINIC | Age: 63
End: 2018-09-21

## 2018-09-21 ENCOUNTER — DOCUMENTATION ONLY (OUTPATIENT)
Dept: FAMILY PLANNING/WOMEN'S HEALTH CLINIC | Age: 63
End: 2018-09-21

## 2018-09-21 VITALS
OXYGEN SATURATION: 99 % | HEIGHT: 60 IN | TEMPERATURE: 98.1 F | BODY MASS INDEX: 30.15 KG/M2 | DIASTOLIC BLOOD PRESSURE: 80 MMHG | RESPIRATION RATE: 14 BRPM | HEART RATE: 92 BPM | WEIGHT: 153.6 LBS | SYSTOLIC BLOOD PRESSURE: 140 MMHG

## 2018-09-21 DIAGNOSIS — E55.9 VITAMIN D DEFICIENCY: ICD-10-CM

## 2018-09-21 DIAGNOSIS — E03.9 ACQUIRED HYPOTHYROIDISM: ICD-10-CM

## 2018-09-21 DIAGNOSIS — E66.9 OBESITY (BMI 30.0-34.9): ICD-10-CM

## 2018-09-21 DIAGNOSIS — I10 ESSENTIAL HYPERTENSION: ICD-10-CM

## 2018-09-21 DIAGNOSIS — E13.9 OTHER SPECIFIED DIABETES MELLITUS WITHOUT COMPLICATION, WITHOUT LONG-TERM CURRENT USE OF INSULIN (HCC): Primary | ICD-10-CM

## 2018-09-21 PROBLEM — E11.21 TYPE 2 DIABETES WITH NEPHROPATHY (HCC): Status: ACTIVE | Noted: 2018-09-21

## 2018-09-21 LAB
ALBUMIN UR QL STRIP: 10 MG/L
CREATININE, URINE POC: 200 MG/DL
MICROALBUMIN/CREAT RATIO POC: NORMAL MG/G

## 2018-09-21 RX ORDER — LISINOPRIL 10 MG/1
10 TABLET ORAL DAILY
Qty: 30 TAB | Refills: 0 | Status: SHIPPED | OUTPATIENT
Start: 2018-09-21 | End: 2018-10-21

## 2018-09-21 NOTE — PROGRESS NOTES
Talked to patient about her recent pap result (no signs of cervical cancer)  but the need for antibiotics with her Trich. Told her not to drink any alcohol while taking this medication. And that her partner should be treated as well. Patient verbalize understanding.

## 2018-09-21 NOTE — PROGRESS NOTES
Written by Capo Cullen, as dictated by Dr. Deborah Chen MD. 
 
[address] [facility] Noah Burks is a 59 y.o. female. HPI The patient presents today for a medication refill. She is not fasting for labs. She is compliant on metformin 500 mg once daily. She is not taking metformin BID because she checks her BS three times daily and they have been good. The patient notes that recently she has been taking metformin once weekly as she has not picked up a refill. She notes that she has been maintaining a healthy diet, but notes that she sometimes eats rice and bread. She has been drinking lots of water and does not drink soda. BP is high today at 146/93, 142/80 on repeat. She notes that her BP has been running high around 140/90. 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 This is stressful for her . She is also working 2 days per week as a  for [employer]. Compliant on levothyroxine 88 mcg and pravastatin 40 mg. She is also taking aspirin 81 mg. She is not taking vitamin D. She denies chest tightness, SOB, constipation. She notes that sometimes she gets \"funny\" sensations on the sides of her chest after she eats. The patient notes that she has trichomoniasis and her , from whom she is , has been around lately. She was given a prescription and will start taking it. She went to [facility] as she does not have insurance. She has a mammogram scheduled for 10/13. She had a pap smear and has an eye exam scheduled with Dr. Alison Sanford (ophthalmology) on 09/24. She went to the podiatrist and notes that she was told her pulses were good. The patient notes that she is due for a colonoscopy, but she wants to wait for a colonoscopy. Patient Active Problem List  
Diagnosis Code  Hypothyroidism E03.9  Anemia D64.9  Hyperlipidemia E78.5  Diabetes mellitus ([facility] E11.9 Current Outpatient Prescriptions on File Prior to Visit Medication Sig Dispense Refill  metFORMIN (GLUCOPHAGE) 500 mg tablet Take 1 Tab by mouth two (2) times daily (with meals). 180 Tab 0  
 levothyroxine (SYNTHROID) 88 mcg tablet TAKE ONE TABLET BY MOUTH ONCE DAILY BEFORE BREAKFAST 90 Tab 0  pravastatin (PRAVACHOL) 40 mg tablet TAKE ONE TABLET BY MOUTH ONCE NIGHTLY 90 Tab 0  Blood-Glucose Meter monitoring kit Please use four times a day and as needed. Dx. E11.9 1 Kit 0  
 glucose blood VI test strips (BLOOD GLUCOSE TEST) strip Use to test blood sugars 3 times a day and as needed DX. E11.9 100 Strip 6  Lancets misc Use to check blood sugars 3 times daily and as needed 200 Each 11  
 aspirin delayed-release (ASPIRIN EC) 81 mg tablet Take 81 mg by mouth daily.  Cholecalciferol, Vitamin D3, (VITAMIN D3) 2,000 unit cap capsule 1 tablet daily. 90 Cap 0 No current facility-administered medications on file prior to visit. Past Medical History:  
Diagnosis Date  Anemia  High cholesterol  Thyroid disease   
 tyroid nodule removed 1998 Past Surgical History:  
Procedure Laterality Date  HX HEENT  THYROIDECTOMY Family History Problem Relation Age of Onset  Diabetes Mother  Hypertension Mother  High Cholesterol Mother  Diabetes Sister  Hypertension Sister  Stroke Sister  Diabetes Brother  Hypertension Brother  Diabetes Maternal Grandmother  Heart Disease Maternal Grandmother Social History Social History  Marital status: LEGALLY  Spouse name: N/A  
 Number of children: N/A  
 Years of education: N/A Occupational History  Not on file. Social History Main Topics  Smoking status: Current Every Day Smoker Packs/day: 0.50 Years: 30.00 Types: Cigarettes  Smokeless tobacco: Never Used Comment: pt smokes 1/2 pack a day  Alcohol use No  
 Drug use:  No  
  Sexual activity: Yes Other Topics Concern  Not on file Social History Narrative Review of Systems Constitutional: Negative for malaise/fatigue. Respiratory: Negative for cough and shortness of breath. Cardiovascular: Negative for chest pain and palpitations. Gastrointestinal: Negative for abdominal pain and heartburn. Genitourinary: Negative for frequency and urgency. Musculoskeletal: Negative for joint pain and myalgias. Neurological: Negative for dizziness, tingling, sensory change, weakness and headaches. Psychiatric/Behavioral: Negative for depression, memory loss and substance abuse. Visit Vitals  /80 (BP 1 Location: Left arm, BP Patient Position: Sitting)  Pulse 92  Temp 98.1 °F (36.7 °C) (Oral)  Resp 14  
 Ht 5' (1.524 m)  Wt 153 lb 9.6 oz (69.7 kg)  LMP 12/04/2012  SpO2 99%  BMI 30 kg/m2 Physical Exam  
Constitutional: She is oriented to person, place, and time. She appears well-developed and well-nourished. No distress. HENT:  
Right Ear: External ear normal.  
Left Ear: External ear normal.  
Eyes: Conjunctivae and EOM are normal. Right eye exhibits no discharge. Left eye exhibits no discharge. Neck: Normal range of motion. Neck supple. Cardiovascular: Normal rate and regular rhythm. Pulmonary/Chest: Effort normal and breath sounds normal. She has no wheezes. Abdominal: Soft. Bowel sounds are normal. There is no tenderness. Lymphadenopathy:  
  She has no cervical adenopathy. Neurological: She is alert and oriented to person, place, and time. Skin: She is not diaphoretic. Psychiatric: She has a normal mood and affect. Her behavior is normal.  
Nursing note and vitals reviewed. Diabetic foot exam:  
 
Left: Vibratory sensation normal  
 Sharp/dull discrimination normal  
 Filament test normal sensation with micro filament Pulse DP: 2+ (normal) Deformities: None Right: Vibratory sensation normal 
 Sharp/dull discrimination normal 
 Filament test normal sensation with micro filament Pulse DP: 2+ (normal) Deformities: None ASSESSMENT and PLAN 
  ICD-10-CM ICD-9-CM 1. Essential hypertension A04 057.9 METABOLIC PANEL, COMPREHENSIVE  
   lisinopril (PRINIVIL, ZESTRIL) 10 mg tablet sent to pharmacy. CMP ordered. Lisinopril 10 mg prescribed. Potential side effects were discussed. She should message me if she starts to experience a dry cough. She should check her BP at home. 2. Acquired hypothyroidism E03.9 244.9 TSH 3RD GENERATION I will check her TSH. Compliant on levothyroxine 88 mcg. I will determine if a dosage change is necessary after checking her TSH. 3. Other specified diabetes mellitus without complication, without long-term current use of insulin (HCC) E13.9 250.00 HEMOGLOBIN A1C WITH EAG  
   AMB POC URINE, MICROALBUMIN, SEMIQUANT (1 RESULT) HM DIABETES FOOT EXAM 
 
I will check her HA1c. Urine microalbumin checked in office. Foot exam performed. I will not make any changed to her metformin prescription until I look at her blood work. 4. Vitamin D deficiency E55.9 268.9 VITAMIN D, 25 HYDROXY I will check her vitamin D.  
5. Obesity (BMI 30.0-34. 9) E66.9 278.00 Encouraged to maintain a healthy diet and exercise. She should ask for a Dexa when she goes for her mammogram. 
 
This plan was reviewed with the patient and patient agrees. All questions were answered. This scribe documentation was reviewed by me and accurately reflects the examination and decisions made by me. This note will not be viewable in [address]

## 2018-09-21 NOTE — PROGRESS NOTES
Visit Vitals  BP (!) 146/93 (BP 1 Location: Left arm, BP Patient Position: Sitting)  Pulse 92  Temp 98.1 °F (36.7 °C) (Oral)  Resp 14  
 Ht 5' (1.524 m)  Wt 153 lb 9.6 oz (69.7 kg)  SpO2 99%  BMI 30 kg/m2 Chief Complaint Patient presents with  Medication Evaluation 1. Have you been to the ER, urgent care clinic since your last visit? Hospitalized since your last visit? [address] - PAP on 09/17/2018 and dx trichomonas States she has an RX for Diflucan waiting 2. Have you seen or consulted any other health care providers outside of the [facility] since your last visit? Include any pap smears or colon screening. Denies

## 2018-09-21 NOTE — MR AVS SNAPSHOT
[address] [place] [address] 57 
[phone] Patient: Yuan Jones MRN: [id number] BCV:[id number] Visit Information Date & Time Provider Department Dept. Phone Encounter #  
 9/21/2018 12:45 PM Ashley Finch MD [facility] 2. [phone] [id number] Upcoming Health Maintenance Date Due  
 EYE EXAM RETINAL OR DILATED Q1 9/12/1966 Pneumococcal 19-64 Medium Risk (1 of 1 - PPSV23) 9/12/1975 DTaP/Tdap/Td series (1 - Tdap) 9/12/1977 COLONOSCOPY 4/22/2018 Influenza Age 5 to Adult 8/1/2018 BREAST CANCER SCRN MAMMOGRAM 11/23/2018 LIPID PANEL Q1 3/12/2019 HEMOGLOBIN A1C Q6M 3/21/2019 FOOT EXAM Q1 9/21/2019 MICROALBUMIN Q1 9/21/2019 PAP AKA CERVICAL CYTOLOGY 9/17/2021 Allergies as of 9/21/2018  Review Complete On: 9/21/2018 By: Ashley Finch MD  
 No Known Allergies Current Immunizations  Reviewed on 2/28/2017 Name Date Influenza Vaccine 10/21/2015 Influenza Vaccine (Quad) PF 2/28/2017 Not reviewed this visit You Were Diagnosed With   
  
 Codes Comments Essential hypertension    -  Primary ICD-10-CM: I10 
ICD-9-CM: 401.9 Acquired hypothyroidism     ICD-10-CM: E03.9 ICD-9-CM: 244.9 Other specified diabetes mellitus without complication, without long-term current use of insulin ([facility]     ICD-10-CM: E13.9 ICD-9-CM: 250.00 Vitamin D deficiency     ICD-10-CM: E55.9 ICD-9-CM: 268.9 Obesity (BMI 30.0-34.9)     ICD-10-CM: P57.1 ICD-9-CM: 278.00 Vitals BP Pulse Temp Resp Height(growth percentile) Weight(growth percentile) 140/80 (BP 1 Location: Left arm, BP Patient Position: Sitting) 92 98.1 °F (36.7 °C) (Oral) 14 5' (1.524 m) 153 lb 9.6 oz (69.7 kg) LMP SpO2 BMI OB Status Smoking Status 12/04/2012 99% 30 kg/m2 Postmenopausal Current Every Day Smoker Vitals History BMI and BSA Data Body Mass Index Body Surface Area  
 30 kg/m 2 1.72 m 2 Preferred Pharmacy Pharmacy Name Phone [id number] [facility] [phone] Your Updated Medication List  
  
   
This list is accurate as of 9/21/18  2:14 PM.  Always use your most recent med list.  
  
  
  
  
 Aspirin EC 81 mg tablet Generic drug:  aspirin delayed-release Take 81 mg by mouth daily. Blood-Glucose Meter monitoring kit Please use four times a day and as needed. Dx. E11.9  
  
 glucose blood VI test strips strip Commonly known as:  blood glucose test  
Use to test blood sugars 3 times a day and as needed DX. E11.9 Lancets Misc Use to check blood sugars 3 times daily and as needed  
  
 levothyroxine 88 mcg tablet Commonly known as:  SYNTHROID  
TAKE ONE TABLET BY MOUTH ONCE DAILY BEFORE BREAKFAST  
  
 lisinopril 10 mg tablet Commonly known as:  Merilynn Middleton Take 1 Tab by mouth daily for 30 days. metFORMIN 500 mg tablet Commonly known as:  GLUCOPHAGE Take 1 Tab by mouth two (2) times daily (with meals). pravastatin 40 mg tablet Commonly known as:  PRAVACHOL  
TAKE ONE TABLET BY MOUTH ONCE NIGHTLY Prescriptions Sent to Pharmacy Refills  
 lisinopril (PRINIVIL, ZESTRIL) 10 mg tablet 0 Sig: Take 1 Tab by mouth daily for 30 days. Class: Normal  
 Pharmacy: [id number] [address] [place] [address] #: [phone] Route: Oral  
  
We Performed the Following AMB POC URINE, MICROALBUMIN, SEMIQUANT (1 RESULT) [65727 CPT(R)] HEMOGLOBIN A1C WITH EAG [68845 CPT(R)]  DIABETES FOOT EXAM [7 Custom] METABOLIC PANEL, COMPREHENSIVE [76049 CPT(R)] TSH 3RD GENERATION [63534 CPT(R)] VITAMIN D, 25 HYDROXY [id number] CPT(R)] To-Do List   
 10/13/2018 8:00 AM  
  Appointment with [facility] at [facility] ([phone]) Shower or bathe using soap and water. Do not use deodorant, powder, perfumes, or lotion the day of your exam.  If your prior mammograms were not performed at [facility] 6 please bring films with you or forward prior images 2 days before your procedure. Check in at registration 15min before your appointment time unless you were instructed to do otherwise. A script is not necessary, but if you have one, please bring it on the day of the mammogram or have it faxed to the department. You are responsible for finding a method of transportation to your appointment. If you don't have transportation, please reschedule your appointment at least 24 hours in advance. [facility] [phone] [facility]  [phone] [facility] DANELLE  [phone]  [phone] [address] [phone] [facility]! Dear Nell Cortez: Thank you for requesting a [email or web address] account. Our records indicate that you already have an active [email or web address] account. You can access your account anytime at [email or web address] Did you know that you can access your hospital and ER discharge instructions at any time in [email or web address]? You can also review all of your test results from your hospital stay or ER visit. Additional Information If you have questions, please visit the Frequently Asked Questions section of the [email or web address] website at [email or web address] Remember, [email or web address] is NOT to be used for urgent needs. For medical emergencies, dial 911. Now available from your iPhone and Android! Please provide this summary of care documentation to your next provider. Your primary care clinician is listed as Michael Nettles. If you have any questions after today's visit, please call [phone].

## 2018-09-22 LAB
25(OH)D3+25(OH)D2 SERPL-MCNC: 15.6 NG/ML (ref 30–100)
ALBUMIN SERPL-MCNC: 5 G/DL (ref 3.6–4.8)
ALBUMIN/GLOB SERPL: 1.8 {RATIO} (ref 1.2–2.2)
ALP SERPL-CCNC: 94 IU/L (ref 39–117)
ALT SERPL-CCNC: 15 IU/L (ref 0–32)
AST SERPL-CCNC: 18 IU/L (ref 0–40)
BILIRUB SERPL-MCNC: 0.4 MG/DL (ref 0–1.2)
BUN SERPL-MCNC: 13 MG/DL (ref 8–27)
BUN/CREAT SERPL: 16 (ref 12–28)
CALCIUM SERPL-MCNC: 9.6 MG/DL (ref 8.7–10.3)
CHLORIDE SERPL-SCNC: 99 MMOL/L (ref 96–106)
CO2 SERPL-SCNC: 26 MMOL/L (ref 20–29)
CREAT SERPL-MCNC: 0.82 MG/DL (ref 0.57–1)
EST. AVERAGE GLUCOSE BLD GHB EST-MCNC: 140 MG/DL
GLOBULIN SER CALC-MCNC: 2.8 G/DL (ref 1.5–4.5)
GLUCOSE SERPL-MCNC: 101 MG/DL (ref 65–99)
HBA1C MFR BLD: 6.5 % (ref 4.8–5.6)
POTASSIUM SERPL-SCNC: 4.2 MMOL/L (ref 3.5–5.2)
PROT SERPL-MCNC: 7.8 G/DL (ref 6–8.5)
SODIUM SERPL-SCNC: 138 MMOL/L (ref 134–144)
TSH SERPL DL<=0.005 MIU/L-ACNC: 0.17 UIU/ML (ref 0.45–4.5)

## 2018-09-24 DIAGNOSIS — E11.21 TYPE 2 DIABETES WITH NEPHROPATHY (HCC): ICD-10-CM

## 2018-09-24 DIAGNOSIS — E03.9 ACQUIRED HYPOTHYROIDISM: Primary | ICD-10-CM

## 2018-09-24 DIAGNOSIS — E55.9 VITAMIN D DEFICIENCY: ICD-10-CM

## 2018-09-24 RX ORDER — LEVOTHYROXINE SODIUM 88 UG/1
TABLET ORAL
Qty: 90 TAB | Refills: 0 | Status: SHIPPED | OUTPATIENT
Start: 2018-09-24 | End: 2019-01-15 | Stop reason: SDUPTHER

## 2018-09-24 RX ORDER — METFORMIN HYDROCHLORIDE 500 MG/1
500 TABLET ORAL 2 TIMES DAILY WITH MEALS
Qty: 180 TAB | Refills: 0 | Status: SHIPPED | OUTPATIENT
Start: 2018-09-24 | End: 2019-01-15 | Stop reason: SDUPTHER

## 2018-09-24 RX ORDER — MELATONIN
1000 DAILY
Qty: 30 TAB | Refills: 2 | Status: SHIPPED | OUTPATIENT
Start: 2018-09-24 | End: 2019-01-08 | Stop reason: SDUPTHER

## 2018-09-24 NOTE — PROGRESS NOTES
Manjula Polanco, your prescriptions sent to the pharmacy as we had discussed. Please cut down levothyroxine dose in half on Tuesday & Thursday. Repeat labs in 8 weeks.

## 2018-10-03 DIAGNOSIS — A59.9 TRICHOMONAS VAGINALIS INFECTION: ICD-10-CM

## 2018-10-03 DIAGNOSIS — N89.8 VAGINAL DISCHARGE: ICD-10-CM

## 2018-10-03 RX ORDER — METRONIDAZOLE 500 MG/1
2000 TABLET ORAL ONCE
Qty: 4 TAB | Refills: 0 | OUTPATIENT
Start: 2018-10-03 | End: 2018-10-03

## 2018-10-03 RX ORDER — FLUCONAZOLE 150 MG/1
150 TABLET ORAL DAILY
Qty: 3 TAB | Refills: 0 | Status: SHIPPED | OUTPATIENT
Start: 2018-10-03 | End: 2018-10-06

## 2018-10-03 NOTE — TELEPHONE ENCOUNTER
From: Domingo Thomas  To: GIA Jeffries  Sent: 10/3/2018 12:04 PM EDT  Subject: Medication Renewal Request    Original authorizing provider:  GIA Jeffries would like a refill of the following medications:  metroNIDAZOLE (FLAGYL) 500 mg tablet [GIA Hale]    Preferred pharmacy: [facility] [address]    Comment:

## 2018-10-03 NOTE — TELEPHONE ENCOUNTER
Pt needs to see her provider Dr Freddy Tapia per Mauricio Haile. Pt sent Dr Freddy Tapia a message today requesting medications      Nothing further needs to be done at this time.

## 2018-10-03 NOTE — TELEPHONE ENCOUNTER
Patient request refill of medication for std. States that she will make appointment soon for side pain but does not have insurance and has to self pay.   Can patient have her refill  Last refill 5/2  Last office visit 9/21

## 2018-10-29 RX ORDER — LISINOPRIL 10 MG/1
10 TABLET ORAL DAILY
Qty: 90 TAB | Refills: 0 | Status: SHIPPED | OUTPATIENT
Start: 2018-10-29 | End: 2019-01-08 | Stop reason: SDUPTHER

## 2018-11-09 DIAGNOSIS — E78.00 HYPERCHOLESTEROLEMIA: ICD-10-CM

## 2018-11-09 RX ORDER — PRAVASTATIN SODIUM 40 MG/1
TABLET ORAL
Qty: 90 TAB | Refills: 0 | Status: SHIPPED | OUTPATIENT
Start: 2018-11-09 | End: 2019-02-21 | Stop reason: SDUPTHER

## 2018-11-19 ENCOUNTER — HOSPITAL ENCOUNTER (OUTPATIENT)
Dept: MAMMOGRAPHY | Age: 63
Discharge: HOME | End: 2018-11-19

## 2018-11-19 DIAGNOSIS — Z12.31 SCREENING MAMMOGRAM, ENCOUNTER FOR: ICD-10-CM

## 2018-11-19 PROCEDURE — 77067 SCR MAMMO BI INCL CAD: CPT

## 2019-01-08 DIAGNOSIS — E55.9 VITAMIN D DEFICIENCY: ICD-10-CM

## 2019-01-08 RX ORDER — CHOLECALCIFEROL (VITAMIN D3) 25 MCG
TABLET ORAL
Qty: 30 TAB | Refills: 2 | Status: SHIPPED | OUTPATIENT
Start: 2019-01-08 | End: 2022-08-02 | Stop reason: ALTCHOICE

## 2019-01-08 RX ORDER — LISINOPRIL 10 MG/1
TABLET ORAL
Qty: 90 TAB | Refills: 0 | Status: SHIPPED | OUTPATIENT
Start: 2019-01-08 | End: 2019-05-23 | Stop reason: SDUPTHER

## 2019-01-10 ENCOUNTER — OFFICE VISIT (OUTPATIENT)
Dept: PRIMARY CARE CLINIC | Age: 64
End: 2019-01-10

## 2019-01-10 VITALS
HEART RATE: 83 BPM | RESPIRATION RATE: 16 BRPM | WEIGHT: 154 LBS | TEMPERATURE: 97.9 F | SYSTOLIC BLOOD PRESSURE: 134 MMHG | BODY MASS INDEX: 30.23 KG/M2 | OXYGEN SATURATION: 100 % | HEIGHT: 60 IN | DIASTOLIC BLOOD PRESSURE: 84 MMHG

## 2019-01-10 DIAGNOSIS — J31.2 CHRONIC SORE THROAT: Primary | ICD-10-CM

## 2019-01-10 DIAGNOSIS — Z23 ENCOUNTER FOR IMMUNIZATION: ICD-10-CM

## 2019-01-10 DIAGNOSIS — Z72.0 TOBACCO ABUSE: ICD-10-CM

## 2019-01-10 DIAGNOSIS — E11.9 WELL CONTROLLED DIABETES MELLITUS (HCC): ICD-10-CM

## 2019-01-10 DIAGNOSIS — E03.9 ACQUIRED HYPOTHYROIDISM: ICD-10-CM

## 2019-01-10 NOTE — PROGRESS NOTES
Written by Karina Moralez, as dictated by Dr. Juaquin Mcclure MD. 
[address] [facility] Renee Delong is a 59 y.o. female. HPI The patient presents today c/o sore throat x 3-4 weeks. Patient describes the sensation as irritation and denies pain and rates her discomfort at 1-2/10. Denies dysphagia but notes occasional ear fullness which is sometimes worsened by the air. The patient notes that she took 1 tab Benadryl with some relief. She has been smoking for more than 40 years and notes that she has tried to quit but needs assistance. The patient smokes about 10 cigarettes per day. Patient is concerned about Chantix as a friend of hers tried to kill himself while taking Chantix. The patient is planning to make an appointment with her podiatrist as she has a lot of ingrown toenails were are painful. She tried to cut out one toenail, but thinks she cut the nail too far back. Patient notes that the color of the nail is normal and she has been keeping the area clean with ETOH and peroxide. Lamont Velázquez Patient weighs 154 lbs today and she has gained weight from 153 lbs in 09/2018. Compliant on metformin 500 mg BID. She has been trying to watch her diet. Compliant on levothyroxine 88 mcg 5 days per week and 44 mcg 2 days per week for hypothyroidism. She requests a flu shot today. 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 Patient Active Problem List  
Diagnosis Code  Hypothyroidism E03.9  Anemia D64.9  Hyperlipidemia E78.5  Type 2 diabetes with nephropathy (HCC) E11.21 Current Outpatient Medications on File Prior to Visit Medication Sig Dispense Refill  VITAMIN D3 1,000 unit tablet TAKE ONE TABLET BY MOUTH ONE TIME DAILY 30 Tab 2  
 lisinopril (PRINIVIL, ZESTRIL) 10 mg tablet TAKE ONE TABLET BY MOUTH ONE TIME DAILY 90 Tab 0  
  metFORMIN (GLUCOPHAGE) 500 mg tablet Take 1 Tab by mouth two (2) times daily (with meals). 180 Tab 0  
 levothyroxine (SYNTHROID) 88 mcg tablet TAKE ONE TABLET BY MOUTH ONCE DAILY BEFORE BREAKFAST 90 Tab 0  pravastatin (PRAVACHOL) 40 mg tablet TAKE ONE TABLET BY MOUTH ONCE NIGHTLY 90 Tab 0  Blood-Glucose Meter monitoring kit Please use four times a day and as needed. Dx. E11.9 1 Kit 0  
 glucose blood VI test strips (BLOOD GLUCOSE TEST) strip Use to test blood sugars 3 times a day and as needed DX. E11.9 100 Strip 6  Lancets misc Use to check blood sugars 3 times daily and as needed 200 Each 11  
 aspirin delayed-release (ASPIRIN EC) 81 mg tablet Take 81 mg by mouth daily.  pravastatin (PRAVACHOL) 40 mg tablet TAKE 1 TABLET BY MOUTH NIGHTLY 90 Tab 0 No current facility-administered medications on file prior to visit. Past Medical History:  
Diagnosis Date  Anemia  High cholesterol  Thyroid disease   
 tyroid nodule removed 1998 Past Surgical History:  
Procedure Laterality Date  HX HEENT  THYROIDECTOMY Family History Problem Relation Age of Onset  Diabetes Mother  Hypertension Mother  High Cholesterol Mother  Diabetes Sister  Hypertension Sister  Stroke Sister  Diabetes Brother  Hypertension Brother  Diabetes Maternal Grandmother  Heart Disease Maternal Grandmother Social History Socioeconomic History  Marital status: LEGALLY  Spouse name: Not on file  Number of children: Not on file  Years of education: Not on file  Highest education level: Not on file Social Needs  Financial resource strain: Not on file  Food insecurity - worry: Not on file  Food insecurity - inability: Not on file  Transportation needs - medical: Not on file  Transportation needs - non-medical: Not on file Occupational History  Not on file Tobacco Use  
  Smoking status: Current Every Day Smoker Packs/day: 0.50 Years: 30.00 Pack years: 15.00 Types: Cigarettes  Smokeless tobacco: Never Used  Tobacco comment: pt smokes 1/2 pack a day Substance and Sexual Activity  Alcohol use: No  
 Drug use: No  
 Sexual activity: Yes Other Topics Concern  Not on file Social History Narrative  Not on file Review of Systems Constitutional: Negative for malaise/fatigue. HENT: Positive for ear pain (BL) and sore throat. Negative for congestion. Eyes: Negative for blurred vision and pain. Respiratory: Negative for cough and shortness of breath. Cardiovascular: Negative for chest pain and palpitations. Gastrointestinal: Negative for abdominal pain and heartburn. Genitourinary: Negative for frequency and urgency. Musculoskeletal: Negative for joint pain and myalgias. Neurological: Negative for dizziness, tingling, sensory change, weakness and headaches. Psychiatric/Behavioral: Positive for substance abuse (tobacco). Negative for depression and memory loss. Visit Vitals /84 (BP 1 Location: Right arm, BP Patient Position: Sitting) Pulse 83 Temp 97.9 °F (36.6 °C) (Oral) Resp 16 Ht 5' (1.524 m) Wt 154 lb (69.9 kg) LMP 12/04/2012 SpO2 100% BMI 30.08 kg/m² Physical Exam  
Constitutional: She is oriented to person, place, and time. She appears well-developed. No distress. Obese HENT:  
Right Ear: External ear normal.  
Left Ear: External ear normal.  
Mouth/Throat: Oropharynx is clear and moist.  
Oropharynx erythema on sides Eyes: Conjunctivae and EOM are normal. Right eye exhibits no discharge. Left eye exhibits no discharge. Neck: Normal range of motion. Neck supple. Cardiovascular: Normal rate and regular rhythm. Pulmonary/Chest: Effort normal and breath sounds normal. She has no wheezes. Abdominal: Soft. Bowel sounds are normal. There is no tenderness. Lymphadenopathy: She has cervical adenopathy. Neurological: She is alert and oriented to person, place, and time. Skin: She is not diaphoretic. Psychiatric: She has a normal mood and affect. Her behavior is normal.  
Nursing note and vitals reviewed. ASSESSMENT and PLAN 
  ICD-10-CM ICD-9-CM 1. Chronic sore throat J31.2 472.1 She should gargle with warm saltwater 3-4 times daily. She should take OTC Tylenol or ibuprofen for the discomfort. If the discomfort does not resolve, she should return and I will order an US. She should take OTC Zyrtec. 2. Tobacco abuse Z72.0 305.1 Urged her to stop smoking. Explained that smoking increases the risk for throat, tongue, head, and neck cancer. 3. Acquired hypothyroidism E03.9 244.9 TSH RFX ON ABNORMAL TO FREE T4 
 
TSH ordered. Compliant on levothyroxine and will make any necessary adjustments. 4. Encounter for immunization Z23 V03.89 INFLUENZA VIRUS VAC QUAD,SPLIT,PRESV FREE SYRINGE IM Influenza vaccine given in office. 5. Well controlled diabetes mellitus ([facility] E11.9 250.00 HEMOGLOBIN A1C WITH EAG  
   METABOLIC PANEL, COMPREHENSIVE HbA1c and CMP ordered. Compliant on metformin. This plan was reviewed with the patient and patient agrees. All questions were answered. This scribe documentation was reviewed by me and accurately reflects the examination and decisions made by me. This note will not be viewable in [address]

## 2019-01-10 NOTE — PROGRESS NOTES
Visit Vitals /84 (BP 1 Location: Right arm, BP Patient Position: Sitting) Pulse 83 Temp 97.9 °F (36.6 °C) (Oral) Resp 16 Ht 5' (1.524 m) Wt 154 lb (69.9 kg) SpO2 100% BMI 30.08 kg/m² Chief Complaint Patient presents with  Sore Throat States irritation x 1 month history of smoking x 40 + years Patient presents A&Ox3, NAD c/o a throat \"irritation, strange feeling in her throat,\" denies dysphagia, SOB, etc. Denies tenderness in her lymph nodes or fevers, c/o hot flashes and decreased appetite. States that she has a granddaughter that has been sick. Patient would like to have a flu shot today. Additionally, patient c/o ingrown toenail on her L Great toe that is causing her pain. Patient is diabetic, patient teaching provided about the importance of foot care, patient states that she will make an appointment with her podiatrist. 
 
After obtaining consent, and per orders of Dr. Gali Jennings, injection of Fluvarix 0.5ml R Deltoid was given by Duncan Majano LPN. Patient instructed to remain in clinic for 20 minutes afterwards, and to report any adverse reaction to me immediately. Patient tolerated well, no adverse reactions were reported or witnessed. See immunization record for vaccine specifics. End of encounter. 1. Have you been to the ER, urgent care clinic since your last visit? Hospitalized since your last visit? Denies 2. Have you seen or consulted any other health care providers outside of the [address] [place] [address] since your last visit? Include any pap smears or colon screening. Denies

## 2019-01-11 LAB
ALBUMIN SERPL-MCNC: 4.6 G/DL (ref 3.6–4.8)
ALBUMIN/GLOB SERPL: 2.1 {RATIO} (ref 1.2–2.2)
ALP SERPL-CCNC: 76 IU/L (ref 39–117)
ALT SERPL-CCNC: 10 IU/L (ref 0–32)
AST SERPL-CCNC: 15 IU/L (ref 0–40)
BILIRUB SERPL-MCNC: 0.5 MG/DL (ref 0–1.2)
BUN SERPL-MCNC: 15 MG/DL (ref 8–27)
BUN/CREAT SERPL: 21 (ref 12–28)
CALCIUM SERPL-MCNC: 9.4 MG/DL (ref 8.7–10.3)
CHLORIDE SERPL-SCNC: 103 MMOL/L (ref 96–106)
CO2 SERPL-SCNC: 24 MMOL/L (ref 20–29)
CREAT SERPL-MCNC: 0.7 MG/DL (ref 0.57–1)
EST. AVERAGE GLUCOSE BLD GHB EST-MCNC: 131 MG/DL
GLOBULIN SER CALC-MCNC: 2.2 G/DL (ref 1.5–4.5)
GLUCOSE SERPL-MCNC: 94 MG/DL (ref 65–99)
HBA1C MFR BLD: 6.2 % (ref 4.8–5.6)
POTASSIUM SERPL-SCNC: 5.1 MMOL/L (ref 3.5–5.2)
PROT SERPL-MCNC: 6.8 G/DL (ref 6–8.5)
SODIUM SERPL-SCNC: 139 MMOL/L (ref 134–144)
T4 FREE SERPL-MCNC: 1.43 NG/DL (ref 0.82–1.77)
TSH SERPL DL<=0.005 MIU/L-ACNC: 0.18 UIU/ML (ref 0.45–4.5)

## 2019-01-14 NOTE — PROGRESS NOTES
Ercell, your thyroid numbers are still on the lower side. How much synthroid are you taking? Diabetes numbers have improved!

## 2019-01-15 DIAGNOSIS — E03.9 ACQUIRED HYPOTHYROIDISM: ICD-10-CM

## 2019-01-15 DIAGNOSIS — E11.21 TYPE 2 DIABETES WITH NEPHROPATHY (HCC): ICD-10-CM

## 2019-01-15 RX ORDER — METFORMIN HYDROCHLORIDE 500 MG/1
500 TABLET ORAL 2 TIMES DAILY WITH MEALS
Qty: 180 TAB | Refills: 0 | Status: SHIPPED | OUTPATIENT
Start: 2019-01-15 | End: 2019-08-28 | Stop reason: SDUPTHER

## 2019-01-15 RX ORDER — LEVOTHYROXINE SODIUM 88 UG/1
TABLET ORAL
Qty: 90 TAB | Refills: 0 | Status: SHIPPED | OUTPATIENT
Start: 2019-01-15 | End: 2019-12-06 | Stop reason: SDUPTHER

## 2019-02-21 DIAGNOSIS — E78.00 HYPERCHOLESTEROLEMIA: ICD-10-CM

## 2019-02-25 RX ORDER — PRAVASTATIN SODIUM 40 MG/1
TABLET ORAL
Qty: 90 TAB | Refills: 0 | Status: SHIPPED | OUTPATIENT
Start: 2019-02-25 | End: 2019-05-13 | Stop reason: SDUPTHER

## 2019-05-07 DIAGNOSIS — Z23 NEED FOR SHINGLES VACCINE: Primary | ICD-10-CM

## 2019-05-13 DIAGNOSIS — E78.00 HYPERCHOLESTEROLEMIA: ICD-10-CM

## 2019-05-14 RX ORDER — PRAVASTATIN SODIUM 40 MG/1
TABLET ORAL
Qty: 90 TAB | Refills: 0 | Status: SHIPPED | OUTPATIENT
Start: 2019-05-14 | End: 2019-08-28 | Stop reason: SDUPTHER

## 2019-05-23 DIAGNOSIS — I10 HYPERTENSION, UNSPECIFIED TYPE: Primary | ICD-10-CM

## 2019-05-23 RX ORDER — LISINOPRIL 10 MG/1
TABLET ORAL
Qty: 90 TAB | Refills: 0 | Status: SHIPPED | OUTPATIENT
Start: 2019-05-23 | End: 2019-09-24 | Stop reason: SDUPTHER

## 2019-06-27 ENCOUNTER — OFFICE VISIT (OUTPATIENT)
Dept: PRIMARY CARE CLINIC | Age: 64
End: 2019-06-27

## 2019-06-27 VITALS
HEIGHT: 60 IN | WEIGHT: 150 LBS | OXYGEN SATURATION: 98 % | HEART RATE: 87 BPM | RESPIRATION RATE: 16 BRPM | DIASTOLIC BLOOD PRESSURE: 84 MMHG | BODY MASS INDEX: 29.45 KG/M2 | SYSTOLIC BLOOD PRESSURE: 118 MMHG | TEMPERATURE: 98.2 F

## 2019-06-27 DIAGNOSIS — E55.9 VITAMIN D DEFICIENCY: ICD-10-CM

## 2019-06-27 DIAGNOSIS — J06.9 VIRAL UPPER RESPIRATORY TRACT INFECTION: ICD-10-CM

## 2019-06-27 DIAGNOSIS — I10 ESSENTIAL HYPERTENSION: ICD-10-CM

## 2019-06-27 DIAGNOSIS — E03.9 ACQUIRED HYPOTHYROIDISM: ICD-10-CM

## 2019-06-27 DIAGNOSIS — R42 DIZZINESS: Primary | ICD-10-CM

## 2019-06-27 DIAGNOSIS — E11.9 WELL CONTROLLED DIABETES MELLITUS (HCC): ICD-10-CM

## 2019-06-27 DIAGNOSIS — R05.9 COUGH: ICD-10-CM

## 2019-06-27 DIAGNOSIS — R53.83 OTHER FATIGUE: ICD-10-CM

## 2019-06-27 DIAGNOSIS — E78.2 MIXED HYPERLIPIDEMIA: ICD-10-CM

## 2019-06-27 PROBLEM — E11.21 TYPE 2 DIABETES WITH NEPHROPATHY (HCC): Status: RESOLVED | Noted: 2018-09-21 | Resolved: 2019-06-27

## 2019-06-27 RX ORDER — METHYLPREDNISOLONE 4 MG/1
TABLET ORAL
Qty: 1 DOSE PACK | Refills: 0 | Status: SHIPPED | OUTPATIENT
Start: 2019-06-27 | End: 2019-09-25 | Stop reason: ALTCHOICE

## 2019-06-27 RX ORDER — BENZONATATE 200 MG/1
200 CAPSULE ORAL
Qty: 21 CAP | Refills: 0 | Status: SHIPPED | OUTPATIENT
Start: 2019-06-27 | End: 2019-07-04

## 2019-06-27 NOTE — PROGRESS NOTES
Chief Complaint   Patient presents with    Cough     4 days    Generalized Body Aches    Dizziness     in afternoon    Medication Evaluation     patient was taking TSH incorrectly     Visit Vitals  /84 (BP 1 Location: Left arm, BP Patient Position: Sitting)   Pulse 87   Temp 98.2 °F (36.8 °C) (Oral)   Resp 16   Ht 5' (1.524 m)   Wt 150 lb (68 kg)   SpO2 98%   BMI 29.29 kg/m²     1. Have you been to the ER, urgent care clinic since your last visit? Hospitalized since your last visit?no    2. Have you seen or consulted any other health care providers outside of the [address] [place] [address] since your last visit? Include any pap smears or colon screening.  no

## 2019-06-27 NOTE — LETTER
NOTIFICATION RETURN TO WORK 
 
6/27/2019 8:48 AM 
 
Ms. Rosario Mtz [address] [facility] To Whom It May Concern: 
 
Rosario Mtz is currently under the care of Charanjit Love. Patient was seen to day with respiratory infection. Medication prescribed and rest recommended. She will return to work on: 07/01/2019 If there are questions or concerns please have the patient contact our office. Sincerely, Naoh Perkins MD

## 2019-06-27 NOTE — PROGRESS NOTES
Written by George Alva, as dictated by Dr. Michael Nettles MD.    Vee Mireles is a 59 y.o. female. HPI  The patient presents today c/o cough and generalized myalgias since 06/24/2019. Coughing causes pain below her breasts. Her cough is no longer productive and pt notes it is improving. In the afternoons she feels dizzy and off balance. Patient's myalgias have been improving. Her dizziness has not improved. The patient admits that she took 2 doses of someone else's abx which she believes improved her sxs. She took Nyquil and Zhen body and back. Patient has been staying in bed and feels slow. The pt denies fever and chills. Denies exposure to anyone who is sick. She admits that with her work schedule she has not been taking levothyroxine as prescribed. Pt was supposed to be take 1 tab PO before breakfast on Monday, Wednesday, and Saturday and 1/2 tab Tuesday and Thursday. Today she weighs 150 lbs and has lost weight from 154 lbs on 01/10/2019. Patient reports that her eating habits of changes. She no longer eats breakfast, which she believes she should do to keep her BS from dropping. The patient is fasting today. Vitamin B12 was low in the past. She has not been eating meat recently. Patient is compliant on 1 tab metformin 500 mg BID. BP is good today at 118/84. Compliant on lisinopril 10 mg. Denies dry cough. Pt is taking OTC 1,000 iu vitamin D daily. Compliant on pravastatin 40 mg. Patient has not smoked since 03/01/2019. She has been working full time at [employer] and at [employer], but notes she plans to only work at [employer].     Patient Active Problem List   Diagnosis Code    Hypothyroidism E03.9    Anemia D64.9    Hyperlipidemia E78.5    Essential hypertension I10        Current Outpatient Medications on File Prior to Visit   Medication Sig Dispense Refill    lisinopril (PRINIVIL, ZESTRIL) 10 mg tablet TAKE ONE TABLET BY MOUTH ONE TIME DAILY 90 Tab 0    pravastatin (PRAVACHOL) 40 mg tablet TAKE 1 TABLET BY MOUTH NIGHTLY 90 Tab 0    levothyroxine (SYNTHROID) 88 mcg tablet TAKE ONE TABLET BY MOUTH ONCE DAILY BEFORE BREAKFAST Mon Wed Sat 1/2 tablet Tues and Thurs. 90 Tab 0    metFORMIN (GLUCOPHAGE) 500 mg tablet Take 1 Tab by mouth two (2) times daily (with meals). 180 Tab 0    VITAMIN D3 1,000 unit tablet TAKE ONE TABLET BY MOUTH ONE TIME DAILY 30 Tab 2    Blood-Glucose Meter monitoring kit Please use four times a day and as needed. Dx. E11.9 1 Kit 0    glucose blood VI test strips (BLOOD GLUCOSE TEST) strip Use to test blood sugars 3 times a day and as needed DX. E11.9 100 Strip 6    Lancets misc Use to check blood sugars 3 times daily and as needed 200 Each 11    aspirin delayed-release (ASPIRIN EC) 81 mg tablet Take 81 mg by mouth daily. No current facility-administered medications on file prior to visit.         Past Medical History:   Diagnosis Date    Anemia     High cholesterol     Thyroid disease     tyroid nodule removed 1998       Past Surgical History:   Procedure Laterality Date    HX HEENT      THYROIDECTOMY         Family History   Problem Relation Age of Onset    Diabetes Mother     Hypertension Mother     High Cholesterol Mother     Diabetes Sister     Hypertension Sister     Stroke Sister     Diabetes Brother     Hypertension Brother     Diabetes Maternal Grandmother     Heart Disease Maternal Grandmother        Social History     Socioeconomic History    Marital status: LEGALLY      Spouse name: Not on file    Number of children: Not on file    Years of education: Not on file    Highest education level: Not on file   Occupational History    Not on file   Social Needs    Financial resource strain: Not on file    Food insecurity:     Worry: Not on file     Inability: Not on file    Transportation needs:     Medical: Not on file     Non-medical: Not on file   Tobacco Use    Smoking status: Current Every Day Smoker     Packs/day: 0.50     Years: 30.00     Pack years: 15.00     Types: Cigarettes    Smokeless tobacco: Never Used    Tobacco comment: pt smokes 1/2 pack a day   Substance and Sexual Activity    Alcohol use: No    Drug use: No    Sexual activity: Yes   Lifestyle    Physical activity:     Days per week: Not on file     Minutes per session: Not on file    Stress: Not on file   Relationships    Social connections:     Talks on phone: Not on file     Gets together: Not on file     Attends Religion service: Not on file     Active member of club or organization: Not on file     Attends meetings of clubs or organizations: Not on file     Relationship status: Not on file    Intimate partner violence:     Fear of current or ex partner: Not on file     Emotionally abused: Not on file     Physically abused: Not on file     Forced sexual activity: Not on file   Other Topics Concern    Not on file   Social History Narrative    Not on file       Review of Systems   Constitutional: Positive for malaise/fatigue and weight loss (intentional). HENT: Negative for congestion. Eyes: Negative for blurred vision and pain. Respiratory: Positive for cough. Negative for shortness of breath. Cardiovascular: Negative for chest pain and palpitations. Gastrointestinal: Negative for abdominal pain and heartburn. Genitourinary: Negative for frequency and urgency. Musculoskeletal: Positive for myalgias. Negative for joint pain. Neurological: Positive for dizziness. Negative for tingling, sensory change, weakness and headaches. Psychiatric/Behavioral: Negative for depression, memory loss and substance abuse.      Visit Vitals  /84 (BP 1 Location: Left arm, BP Patient Position: Sitting)   Pulse 87   Temp 98.2 °F (36.8 °C) (Oral)   Resp 16   Ht 5' (1.524 m)   Wt 150 lb (68 kg)   LMP 12/04/2012   SpO2 98%   BMI 29.29 kg/m²       Physical Exam   Constitutional: She is oriented to person, place, and time. She appears well-developed and well-nourished. No distress. HENT:   Right Ear: External ear normal.   Left Ear: External ear normal.   Eyes: Conjunctivae and EOM are normal. Right eye exhibits no discharge. Left eye exhibits no discharge. Neck: Normal range of motion. Neck supple. Cardiovascular: Normal rate and regular rhythm. Pulmonary/Chest: Effort normal. She has decreased breath sounds (BL). She has no wheezes. She has rhonchi (scattered). Abdominal: Soft. Bowel sounds are normal. There is no tenderness. Lymphadenopathy:     She has no cervical adenopathy. Neurological: She is alert and oriented to person, place, and time. Skin: She is not diaphoretic. Psychiatric: She has a normal mood and affect. Her behavior is normal.   Nursing note and vitals reviewed. ASSESSMENT and PLAN    ICD-10-CM ICD-9-CM    1. Dizziness R42 780.4 Dizziness is likely due  to URI. Should resolve as URI resolves. 2. Viral upper respiratory tract infection J06.9 465.9 methylPREDNISolone (MEDROL DOSEPACK) 4 mg tablet sent to pharmacy. Medrol dosepack prescribed. 3. Acquired hypothyroidism E03.9 244.9 TSH 3RD GENERATION    TSH ordered. No changes to regimen at this time. Urged pt to take medications as prescribed. 4. Essential hypertension P74 150.8 METABOLIC PANEL, COMPREHENSIVE      CBC W/O DIFF    CMP and CBC ordered. BP is well-controlled on current medication. No change to dosage at this time. 5. [facility] E11.9 250.00 HEMOGLOBIN A1C WITH EAG      TSH 3RD GENERATION      LIPID PANEL      VITAMIN B12    HbA1c, TSH, lipid panel, and vitamin B12 ordered. No changes to regimen at this time. 6. Mixed hyperlipidemia E78.2 272.2 LIPID PANEL    Lipid panel ordered. 7. Other fatigue R53.83 780.79 VITAMIN B12    Vitamin B12 ordered. 8. Vitamin D deficiency E55.9 268.9 VITAMIN D, 25 HYDROXY    Vitamin D ordered.    9. Cough R05 786.2 benzonatate (TESSALON) 200 mg capsule sent to pharmacy. methylPREDNISolone (MEDROL DOSEPACK) 4 mg tablet sent to pharmacy. Tessalon perles prescribed. Potential side effects were discussed. She should take 1 tab PO TID. Medrol dosepack prescribed. Instructed pt to take OTC Zyrtec every night. This plan was reviewed with the patient and patient agrees. All questions were answered. This scribe documentation was reviewed by me and accurately reflects the examination and decisions made by me. This note will not be viewable in [address]

## 2019-06-28 LAB
25(OH)D3+25(OH)D2 SERPL-MCNC: 30.9 NG/ML (ref 30–100)
ALBUMIN SERPL-MCNC: 4.6 G/DL (ref 3.6–4.8)
ALBUMIN/GLOB SERPL: 1.9 {RATIO} (ref 1.2–2.2)
ALP SERPL-CCNC: 76 IU/L (ref 39–117)
ALT SERPL-CCNC: 38 IU/L (ref 0–32)
AST SERPL-CCNC: 25 IU/L (ref 0–40)
BILIRUB SERPL-MCNC: 0.3 MG/DL (ref 0–1.2)
BUN SERPL-MCNC: 14 MG/DL (ref 8–27)
BUN/CREAT SERPL: 17 (ref 12–28)
CALCIUM SERPL-MCNC: 9 MG/DL (ref 8.7–10.3)
CHLORIDE SERPL-SCNC: 104 MMOL/L (ref 96–106)
CHOLEST SERPL-MCNC: 160 MG/DL (ref 100–199)
CO2 SERPL-SCNC: 21 MMOL/L (ref 20–29)
CREAT SERPL-MCNC: 0.82 MG/DL (ref 0.57–1)
ERYTHROCYTE [DISTWIDTH] IN BLOOD BY AUTOMATED COUNT: 13.1 % (ref 12.3–15.4)
EST. AVERAGE GLUCOSE BLD GHB EST-MCNC: 131 MG/DL
GLOBULIN SER CALC-MCNC: 2.4 G/DL (ref 1.5–4.5)
GLUCOSE SERPL-MCNC: 110 MG/DL (ref 65–99)
HBA1C MFR BLD: 6.2 % (ref 4.8–5.6)
HCT VFR BLD AUTO: 38.9 % (ref 34–46.6)
HDLC SERPL-MCNC: 66 MG/DL
HGB BLD-MCNC: 12.9 G/DL (ref 11.1–15.9)
LDLC SERPL CALC-MCNC: 75 MG/DL (ref 0–99)
MCH RBC QN AUTO: 30.4 PG (ref 26.6–33)
MCHC RBC AUTO-ENTMCNC: 33.2 G/DL (ref 31.5–35.7)
MCV RBC AUTO: 92 FL (ref 79–97)
PLATELET # BLD AUTO: 191 X10E3/UL (ref 150–450)
POTASSIUM SERPL-SCNC: 4.4 MMOL/L (ref 3.5–5.2)
PROT SERPL-MCNC: 7 G/DL (ref 6–8.5)
RBC # BLD AUTO: 4.25 X10E6/UL (ref 3.77–5.28)
SODIUM SERPL-SCNC: 141 MMOL/L (ref 134–144)
TRIGL SERPL-MCNC: 93 MG/DL (ref 0–149)
TSH SERPL DL<=0.005 MIU/L-ACNC: 10.26 UIU/ML (ref 0.45–4.5)
VIT B12 SERPL-MCNC: 1116 PG/ML (ref 232–1245)
VLDLC SERPL CALC-MCNC: 19 MG/DL (ref 5–40)
WBC # BLD AUTO: 3.8 X10E3/UL (ref 3.4–10.8)

## 2019-06-30 DIAGNOSIS — E55.9 VITAMIN D DEFICIENCY: Primary | ICD-10-CM

## 2019-06-30 DIAGNOSIS — E03.9 ACQUIRED HYPOTHYROIDISM: Primary | ICD-10-CM

## 2019-06-30 RX ORDER — LEVOTHYROXINE SODIUM 88 UG/1
88 TABLET ORAL
Qty: 90 TAB | Refills: 0 | Status: SHIPPED | OUTPATIENT
Start: 2019-06-30 | End: 2019-08-26 | Stop reason: SDUPTHER

## 2019-06-30 RX ORDER — ERGOCALCIFEROL 1.25 MG/1
50000 CAPSULE ORAL
Qty: 4 CAP | Refills: 1 | Status: SHIPPED | OUTPATIENT
Start: 2019-06-30 | End: 2019-08-26 | Stop reason: SDUPTHER

## 2019-06-30 NOTE — PROGRESS NOTES
Labs discussed. She was taking 1/2 pill of synthroid 4 days ago. New prescription sent to the pharmacy. Repeat labs in 8 weeks. Vitamin D should be 2000 I.u daily dose.

## 2019-08-26 DIAGNOSIS — E03.9 ACQUIRED HYPOTHYROIDISM: ICD-10-CM

## 2019-08-26 DIAGNOSIS — E55.9 VITAMIN D DEFICIENCY: ICD-10-CM

## 2019-08-26 RX ORDER — ERGOCALCIFEROL 1.25 MG/1
CAPSULE ORAL
Qty: 4 CAP | Refills: 1 | Status: SHIPPED | OUTPATIENT
Start: 2019-08-26 | End: 2019-10-20 | Stop reason: SDUPTHER

## 2019-08-26 RX ORDER — LEVOTHYROXINE SODIUM 88 UG/1
TABLET ORAL
Qty: 90 TAB | Refills: 0 | Status: SHIPPED | OUTPATIENT
Start: 2019-08-26 | End: 2020-03-12 | Stop reason: SDUPTHER

## 2019-08-28 DIAGNOSIS — E11.21 TYPE 2 DIABETES WITH NEPHROPATHY (HCC): ICD-10-CM

## 2019-08-28 DIAGNOSIS — E78.00 HYPERCHOLESTEROLEMIA: ICD-10-CM

## 2019-08-28 RX ORDER — PRAVASTATIN SODIUM 40 MG/1
TABLET ORAL
Qty: 90 TAB | Refills: 0 | Status: SHIPPED | OUTPATIENT
Start: 2019-08-28 | End: 2019-11-21 | Stop reason: SDUPTHER

## 2019-08-28 RX ORDER — METFORMIN HYDROCHLORIDE 500 MG/1
500 TABLET ORAL 2 TIMES DAILY WITH MEALS
Qty: 180 TAB | Refills: 0 | Status: SHIPPED | OUTPATIENT
Start: 2019-08-28 | End: 2019-12-04 | Stop reason: SDUPTHER

## 2019-08-28 NOTE — TELEPHONE ENCOUNTER
LOV: 06/27/2019  Refill: 01/15/2019-Metformin            05/14/2019-Pravastatin     Labs: 06/27/2019  Last A1C: 6.2  Next Appt: 09/18-Guero

## 2019-09-24 DIAGNOSIS — I10 HYPERTENSION, UNSPECIFIED TYPE: ICD-10-CM

## 2019-09-24 RX ORDER — LISINOPRIL 10 MG/1
TABLET ORAL
Qty: 90 TAB | Refills: 0 | Status: SHIPPED | OUTPATIENT
Start: 2019-09-24 | End: 2019-11-30 | Stop reason: SDUPTHER

## 2019-09-25 ENCOUNTER — OFFICE VISIT (OUTPATIENT)
Dept: PRIMARY CARE CLINIC | Age: 64
End: 2019-09-25

## 2019-09-25 VITALS
BODY MASS INDEX: 29.37 KG/M2 | SYSTOLIC BLOOD PRESSURE: 122 MMHG | OXYGEN SATURATION: 100 % | HEART RATE: 94 BPM | RESPIRATION RATE: 17 BRPM | WEIGHT: 149.6 LBS | HEIGHT: 60 IN | TEMPERATURE: 98.3 F | DIASTOLIC BLOOD PRESSURE: 83 MMHG

## 2019-09-25 DIAGNOSIS — F51.01 PRIMARY INSOMNIA: ICD-10-CM

## 2019-09-25 DIAGNOSIS — E78.2 MIXED HYPERLIPIDEMIA: ICD-10-CM

## 2019-09-25 DIAGNOSIS — E03.9 ACQUIRED HYPOTHYROIDISM: ICD-10-CM

## 2019-09-25 DIAGNOSIS — I10 ESSENTIAL HYPERTENSION: Primary | ICD-10-CM

## 2019-09-25 DIAGNOSIS — R53.83 OTHER FATIGUE: ICD-10-CM

## 2019-09-25 DIAGNOSIS — M77.50 TENDONITIS OF FOOT: ICD-10-CM

## 2019-09-25 DIAGNOSIS — E11.9 WELL CONTROLLED DIABETES MELLITUS (HCC): ICD-10-CM

## 2019-09-25 RX ORDER — TRAZODONE HYDROCHLORIDE 50 MG/1
50 TABLET ORAL
Qty: 30 TAB | Refills: 0 | Status: SHIPPED | OUTPATIENT
Start: 2019-09-25 | End: 2019-10-25

## 2019-09-25 NOTE — PROGRESS NOTES
Written by Monique Rodriguez, as dictated by Dr. Nina Zaidi MD.    Hipolito Zapien is a 62 y.o. female. HPI  The patient presents today for a follow-up. Patient is not fasting for labs. She has been doing well and working. Compliant on Synthroid 88 mcg. She reports feeling tired, but believes that it might be due to working 40 hours a week. She went to the podiatrist this morning, and was told she might have some tendonitis in her L foot. She was told to use Aspercreme with Lidocaine for her tendonitis. Otherwise, her blood flow was good. She has lost weight. She weighs 149 lbs today and weighed 150 lbs on 06/27/19. She notes that she usually doesn't eat heavy food or breakfast in the morning. She has been trying to lose weight to help with her DM-2. She has been having issue sleeping at night, and has been waking up every 3 hours. She would like to try something to help her sleep. She reports some muscle aches in her BL arms intermittently. She believes that she is catching a cold. Denies leg swelling. Compliant on Metformin 500 mg BID, Lisinopril 10 mg, and Pravastatin 40 mg. She has an appointment with her eye doctor in 10/2019. She not longer smokes. 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 Patient Active Problem List   Diagnosis Code    Hypothyroidism E03.9    Anemia D64.9    Hyperlipidemia E78.5    Essential hypertension I10        Current Outpatient Medications on File Prior to Visit   Medication Sig Dispense Refill    lisinopril (PRINIVIL, ZESTRIL) 10 mg tablet TAKE ONE TABLET BY MOUTH ONE TIME DAILY 90 Tab 0    metFORMIN (GLUCOPHAGE) 500 mg tablet Take 1 Tab by mouth two (2) times daily (with meals).  180 Tab 0    pravastatin (PRAVACHOL) 40 mg tablet Once a day 90 Tab 0    VITAMIN D2 50,000 unit capsule TAKE 1 CAPSULE BY MOUTH EVERY 7 DAYS FOR 4 DOSES 4 Cap 1    levothyroxine (SYNTHROID) 88 mcg tablet TAKE 1 TABLET BY MOUTH ONCE DAILY BEFORE BREAKFAST FOR 90 DAYS 90 Tab 0    levothyroxine (SYNTHROID) 88 mcg tablet TAKE ONE TABLET BY MOUTH ONCE DAILY BEFORE BREAKFAST Mon Wed Sat 1/2 tablet Tues and Thurs. 90 Tab 0    VITAMIN D3 1,000 unit tablet TAKE ONE TABLET BY MOUTH ONE TIME DAILY 30 Tab 2    Blood-Glucose Meter monitoring kit Please use four times a day and as needed. Dx. E11.9 1 Kit 0    glucose blood VI test strips (BLOOD GLUCOSE TEST) strip Use to test blood sugars 3 times a day and as needed DX. E11.9 100 Strip 6    Lancets misc Use to check blood sugars 3 times daily and as needed 200 Each 11    aspirin delayed-release (ASPIRIN EC) 81 mg tablet Take 81 mg by mouth daily. No current facility-administered medications on file prior to visit.         Past Medical History:   Diagnosis Date    Anemia     High cholesterol     Thyroid disease     tyroid nodule removed 1998       Past Surgical History:   Procedure Laterality Date    HX HEENT      THYROIDECTOMY         Family History   Problem Relation Age of Onset    Diabetes Mother     Hypertension Mother     High Cholesterol Mother     Diabetes Sister     Hypertension Sister     Stroke Sister     Diabetes Brother     Hypertension Brother     Diabetes Maternal Grandmother     Heart Disease Maternal Grandmother        Social History     Socioeconomic History    Marital status: LEGALLY      Spouse name: Not on file    Number of children: Not on file    Years of education: Not on file    Highest education level: Not on file   Occupational History    Not on file   Social Needs    Financial resource strain: Not on file    Food insecurity:     Worry: Not on file     Inability: Not on file    Transportation needs:     Medical: Not on file     Non-medical: Not on file   Tobacco Use    Smoking status: Former Smoker     Packs/day: 0.50     Years: 30.00     Pack years: 15.00     Types: Cigarettes     Last attempt to quit: 3/1/2019     Years since quittin.5    Smokeless tobacco: Never Used   Substance and Sexual Activity    Alcohol use: No    Drug use: No    Sexual activity: Yes   Lifestyle    Physical activity:     Days per week: Not on file     Minutes per session: Not on file    Stress: Not on file   Relationships    Social connections:     Talks on phone: Not on file     Gets together: Not on file     Attends [employer] service: Not on file     Active member of club or organization: Not on file     Attends meetings of clubs or organizations: Not on file     Relationship status: Not on file    Intimate partner violence:     Fear of current or ex partner: Not on file     Emotionally abused: Not on file     Physically abused: Not on file     Forced sexual activity: Not on file   Other Topics Concern    Not on file   Social History Narrative    Not on file       Office Visit on 2019   Component Date Value Ref Range Status    Glucose 2019 110* 65 - 99 mg/dL Final    BUN 2019 14  8 - 27 mg/dL Final    Creatinine 2019 0.82  0.57 - 1.00 mg/dL Final    GFR est non-AA 2019 77  >59 mL/min/1.73 Final    GFR est AA 2019 89  >59 mL/min/1.73 Final    BUN/Creatinine ratio 2019 17  12 - 28 Final    Sodium 2019 141  134 - 144 mmol/L Final    Potassium 2019 4.4  3.5 - 5.2 mmol/L Final    Chloride 2019 104  96 - 106 mmol/L Final    CO2 2019 21  20 - 29 mmol/L Final    Calcium 2019 9.0  8.7 - 10.3 mg/dL Final    Protein, total 2019 7.0  6.0 - 8.5 g/dL Final    Albumin 2019 4.6  3.6 - 4.8 g/dL Final    GLOBULIN, TOTAL 2019 2.4  1.5 - 4.5 g/dL Final    A-G Ratio 2019 1.9  1.2 - 2.2 Final    Bilirubin, total 2019 0.3  0.0 - 1.2 mg/dL Final    Alk.  phosphatase 2019 76  39 - 117 IU/L Final    AST (SGOT) 2019 25  0 - 40 IU/L Final    ALT (SGPT) 2019 38* 0 - 32 IU/L Final    Hemoglobin A1c 2019 6.2* 4.8 - 5.6 % Final    Estimated average glucose 06/27/2019 131  mg/dL Final    TSH 06/27/2019 10.260* 0.450 - 4.500 uIU/mL Final    WBC 06/27/2019 3.8  3.4 - 10.8 x10E3/uL Final    RBC 06/27/2019 4.25  3.77 - 5.28 x10E6/uL Final    HGB 06/27/2019 12.9  11.1 - 15.9 g/dL Final    HCT 06/27/2019 38.9  34.0 - 46.6 % Final    MCV 06/27/2019 92  79 - 97 fL Final    MCH 06/27/2019 30.4  26.6 - 33.0 pg Final    MCHC 06/27/2019 33.2  31.5 - 35.7 g/dL Final    RDW 06/27/2019 13.1  12.3 - 15.4 % Final    PLATELET 00/31/6694 888  150 - 450 x10E3/uL Final    Cholesterol, total 06/27/2019 160  100 - 199 mg/dL Final    Triglyceride 06/27/2019 93  0 - 149 mg/dL Final    HDL Cholesterol 06/27/2019 66  >39 mg/dL Final    VLDL, calculated 06/27/2019 19  5 - 40 mg/dL Final    LDL, calculated 06/27/2019 75  0 - 99 mg/dL Final    Vitamin B12 06/27/2019 1,116  232 - 1,245 pg/mL Final    VITAMIN D, 25-HYDROXY 06/27/2019 30.9  30.0 - 100.0 ng/mL Final       Review of Systems   Constitutional: Positive for weight loss (intentional). Negative for malaise/fatigue. HENT: Negative for congestion. Eyes: Negative for blurred vision. Respiratory: Negative for shortness of breath. Cardiovascular: Negative for chest pain and leg swelling. Gastrointestinal: Negative for constipation, diarrhea and heartburn. Genitourinary: Negative for dysuria, frequency and urgency. Musculoskeletal: Positive for joint pain (L foot tendonitis) and myalgias. Neurological: Negative for dizziness and headaches. Psychiatric/Behavioral: Negative for depression and substance abuse. The patient has insomnia. The patient is not nervous/anxious. Visit Vitals  /83 (BP 1 Location: Left arm, BP Patient Position: Sitting)   Pulse 94   Temp 98.3 °F (36.8 °C) (Oral)   Resp 17   Ht 5' (1.524 m)   Wt 149 lb 9.6 oz (67.9 kg)   LMP 12/04/2012   SpO2 100%   BMI 29.22 kg/m²       Physical Exam   Constitutional: She is oriented to person, place, and time.  She appears well-developed and well-nourished. No distress. HENT:   Head: Normocephalic and atraumatic. Right Ear: External ear normal.   Left Ear: External ear normal.   Eyes: Pupils are equal, round, and reactive to light. Conjunctivae and EOM are normal. Right eye exhibits no discharge. Left eye exhibits no discharge. Neck: Normal range of motion. Neck supple. Cardiovascular: Normal rate, regular rhythm and normal heart sounds. Exam reveals no gallop and no friction rub. No murmur heard. Pulmonary/Chest: Effort normal and breath sounds normal. She has no wheezes. Abdominal: Soft. Bowel sounds are normal. There is no tenderness. Musculoskeletal: Normal range of motion. Neurological: She is alert and oriented to person, place, and time. She has normal reflexes. Skin: She is not diaphoretic. Psychiatric: She has a normal mood and affect. Her behavior is normal. Thought content normal.   Nursing note and vitals reviewed. ASSESSMENT and PLAN    ICD-10-CM ICD-9-CM    1. Essential hypertension J24 008.0 METABOLIC PANEL, COMPREHENSIVE      CBC W/O DIFF    CMP and CBC ordered. BP is well-controlled on current medication. No change to dosage at this time. 2. Acquired hypothyroidism E03.9 244.9 TSH 3RD GENERATION    TSH ordered. New synthroid refill will be based on her levels. 3. Mixed hyperlipidemia E78.2 272.2 Compliant on Pravastatin. 4. Other fatigue R53.83 780.79 May be related to hypothyroidism. 5. Tendonitis of foot M77.9 727.06 Followed by Podiatry. Pt will try Aspercreme with Lidocaine cream.   6. Well controlled diabetes mellitus ([facility] E11.9 250.00 HEMOGLOBIN A1C WITH EAG    Hemoglobin A1c ordered. Compliant on Metformin. 7. Primary insomnia F51.01 307.42 traZODone (DESYREL) 50 mg tablet sent to pharmacy. Trazodone 50 mg prescribed. Potential side effects were discussed. Pt should take 1 tab nightly. If still struggling with sleep, pt can take 2 tabs.      This plan was reviewed with the patient and patient agrees. All questions were answered. This scribe documentation was reviewed by me and accurately reflects the examination and decisions made by me. This note will not be viewable in [address]

## 2019-09-25 NOTE — PROGRESS NOTES
Margarita Hernandez is a 62 y.o. female    Chief Complaint   Patient presents with    Hypertension     follow up    Low Blood Sugar     follow up    Vitamin D Deficiency     follow up     1. Have you been to the ER, urgent care clinic since your last visit? Hospitalized since your last visit? No    2. Have you seen or consulted any other health care providers outside of the [address] [place] [address] since your last visit? Include any pap smears or colon screening.  No     Visit Vitals  /83 (BP 1 Location: Left arm, BP Patient Position: Sitting)   Pulse 94   Temp 98.3 °F (36.8 °C) (Oral)   Resp 17   Ht 5' (1.524 m)   Wt 149 lb 9.6 oz (67.9 kg)   SpO2 100%   BMI 29.22 kg/m²

## 2019-09-26 LAB
ALBUMIN SERPL-MCNC: 4.3 G/DL (ref 3.6–4.8)
ALBUMIN/GLOB SERPL: 1.7 {RATIO} (ref 1.2–2.2)
ALP SERPL-CCNC: 59 IU/L (ref 39–117)
ALT SERPL-CCNC: 10 IU/L (ref 0–32)
AST SERPL-CCNC: 13 IU/L (ref 0–40)
BILIRUB SERPL-MCNC: 0.3 MG/DL (ref 0–1.2)
BUN SERPL-MCNC: 14 MG/DL (ref 8–27)
BUN/CREAT SERPL: 22 (ref 12–28)
CALCIUM SERPL-MCNC: 9.2 MG/DL (ref 8.7–10.3)
CHLORIDE SERPL-SCNC: 104 MMOL/L (ref 96–106)
CO2 SERPL-SCNC: 25 MMOL/L (ref 20–29)
CREAT SERPL-MCNC: 0.65 MG/DL (ref 0.57–1)
ERYTHROCYTE [DISTWIDTH] IN BLOOD BY AUTOMATED COUNT: 13.4 % (ref 12.3–15.4)
EST. AVERAGE GLUCOSE BLD GHB EST-MCNC: 126 MG/DL
GLOBULIN SER CALC-MCNC: 2.5 G/DL (ref 1.5–4.5)
GLUCOSE SERPL-MCNC: 118 MG/DL (ref 65–99)
HBA1C MFR BLD: 6 % (ref 4.8–5.6)
HCT VFR BLD AUTO: 38.4 % (ref 34–46.6)
HGB BLD-MCNC: 12.6 G/DL (ref 11.1–15.9)
MCH RBC QN AUTO: 29.9 PG (ref 26.6–33)
MCHC RBC AUTO-ENTMCNC: 32.8 G/DL (ref 31.5–35.7)
MCV RBC AUTO: 91 FL (ref 79–97)
PLATELET # BLD AUTO: 242 X10E3/UL (ref 150–450)
POTASSIUM SERPL-SCNC: 4.1 MMOL/L (ref 3.5–5.2)
PROT SERPL-MCNC: 6.8 G/DL (ref 6–8.5)
RBC # BLD AUTO: 4.21 X10E6/UL (ref 3.77–5.28)
SODIUM SERPL-SCNC: 141 MMOL/L (ref 134–144)
TSH SERPL DL<=0.005 MIU/L-ACNC: 0.01 UIU/ML (ref 0.45–4.5)
WBC # BLD AUTO: 4.3 X10E3/UL (ref 3.4–10.8)

## 2019-09-29 NOTE — PROGRESS NOTES
Jean, hope you are doing well. I left you a message on voicemail to check your My chart. Are you taking synthroid 88mcg daily? TSH has gone down? We need to adjust the dose. Diabetes number look good!

## 2019-09-30 DIAGNOSIS — Z12.11 COLON CANCER SCREENING: Primary | ICD-10-CM

## 2019-10-20 DIAGNOSIS — E55.9 VITAMIN D DEFICIENCY: ICD-10-CM

## 2019-10-20 RX ORDER — ERGOCALCIFEROL 1.25 MG/1
CAPSULE ORAL
Qty: 4 CAP | Refills: 1 | Status: SHIPPED | OUTPATIENT
Start: 2019-10-20 | End: 2019-12-11 | Stop reason: SDUPTHER

## 2019-11-21 DIAGNOSIS — E78.00 HYPERCHOLESTEROLEMIA: ICD-10-CM

## 2019-11-21 RX ORDER — PRAVASTATIN SODIUM 40 MG/1
TABLET ORAL
Qty: 90 TAB | Refills: 0 | Status: SHIPPED | OUTPATIENT
Start: 2019-11-21 | End: 2020-02-23

## 2019-11-30 DIAGNOSIS — I10 HYPERTENSION, UNSPECIFIED TYPE: ICD-10-CM

## 2019-12-02 RX ORDER — LISINOPRIL 10 MG/1
TABLET ORAL
Qty: 90 TAB | Refills: 0 | Status: SHIPPED | OUTPATIENT
Start: 2019-12-02 | End: 2020-02-18

## 2019-12-04 ENCOUNTER — HOSPITAL ENCOUNTER (OUTPATIENT)
Dept: MAMMOGRAPHY | Age: 64
Discharge: HOME | End: 2019-12-04
Attending: INTERNAL MEDICINE
Payer: COMMERCIAL

## 2019-12-04 DIAGNOSIS — Z12.31 VISIT FOR SCREENING MAMMOGRAM: ICD-10-CM

## 2019-12-04 DIAGNOSIS — E11.21 TYPE 2 DIABETES WITH NEPHROPATHY (HCC): ICD-10-CM

## 2019-12-04 PROCEDURE — 77067 SCR MAMMO BI INCL CAD: CPT

## 2019-12-04 RX ORDER — METFORMIN HYDROCHLORIDE 500 MG/1
500 TABLET ORAL 2 TIMES DAILY WITH MEALS
Qty: 180 TAB | Refills: 0 | Status: SHIPPED | OUTPATIENT
Start: 2019-12-04 | End: 2020-02-18

## 2019-12-06 ENCOUNTER — OFFICE VISIT (OUTPATIENT)
Dept: PRIMARY CARE CLINIC | Age: 64
End: 2019-12-06

## 2019-12-06 VITALS
SYSTOLIC BLOOD PRESSURE: 135 MMHG | DIASTOLIC BLOOD PRESSURE: 83 MMHG | HEART RATE: 78 BPM | BODY MASS INDEX: 30.39 KG/M2 | OXYGEN SATURATION: 100 % | WEIGHT: 154.8 LBS | TEMPERATURE: 97.9 F | HEIGHT: 60 IN | RESPIRATION RATE: 16 BRPM

## 2019-12-06 DIAGNOSIS — E03.9 ACQUIRED HYPOTHYROIDISM: ICD-10-CM

## 2019-12-06 DIAGNOSIS — E11.21 TYPE 2 DIABETES WITH NEPHROPATHY (HCC): ICD-10-CM

## 2019-12-06 DIAGNOSIS — I10 HYPERTENSION, UNSPECIFIED TYPE: Primary | ICD-10-CM

## 2019-12-06 DIAGNOSIS — M25.512 CHRONIC LEFT SHOULDER PAIN: ICD-10-CM

## 2019-12-06 DIAGNOSIS — Z23 NEED FOR DIPHTHERIA-TETANUS-PERTUSSIS (TDAP) VACCINE: ICD-10-CM

## 2019-12-06 DIAGNOSIS — G89.29 CHRONIC LEFT SHOULDER PAIN: ICD-10-CM

## 2019-12-06 RX ORDER — DIFLUNISAL 500 MG/1
500 TABLET, FILM COATED ORAL 2 TIMES DAILY
Qty: 20 TAB | Refills: 0 | Status: SHIPPED | OUTPATIENT
Start: 2019-12-06 | End: 2019-12-11 | Stop reason: SDUPTHER

## 2019-12-06 RX ORDER — LEVOTHYROXINE SODIUM 88 UG/1
TABLET ORAL
Qty: 90 TAB | Refills: 0 | Status: SHIPPED | OUTPATIENT
Start: 2019-12-06 | End: 2020-11-04 | Stop reason: SDUPTHER

## 2019-12-06 NOTE — PROGRESS NOTES
Written by Monika Clifford, as dictated by Dr. Aristeo Benson MD.    Guillermo Vela is a 62 y.o. female. HPI   The patient presents today for follow up. Patient is not fasting for labs. Per chart review, she had a colonoscopy done on 11/20/19 that showed \"mild diverticulosis of the left side of the colon. Internal hemorrhoids\", and she is not due for another colonoscopy for 5 years. She also recently had her mammogram done on 10/22/19, which was negative. She currently endorses bilateral shoulder pain that she has been experiencing for the past 2 months. She states that the pain is intermittent and \"jumps around\" between her right and left shoulders, but is worse on her left shoulder and under her axilla, especially after her mammogram. She states that certain movements such as extending her arm back exacerbate her pain. She has not been seen for this pain until today. She states that she is still able to work as normal. She has tried [employer] and Back tablets for the pain with some relief. She states that the pain is worse at night. She reports that the pain is currently a 2-3/10 today, but will likely increase to a 7-8/10 later. She states that she reaches up a lot on her job, but does not do much heavy lifting. She states that her sister is experiencing similar symptoms. She states that her BS levels have been good. Compliant on Metformin 500 mg. She states that she may have overindulged around Thanksgiving. BP looks good today at 135/83. Compliant on Lisinopril 10 mg. She has gotten her eye exam this year, but was told that she needs to return for extensive testing to rule out cataracts in her R eye. She has enjoyed working at [employer]. She quit smoking in 03/2019. She received her flu shot this year. She has never received the Tdap vaccine.     Patient Active Problem List   Diagnosis Code    Hypothyroidism E03.9    Anemia D64.9    Hyperlipidemia E78.5    Essential hypertension I10    Well controlled diabetes mellitus ([facility] [address] E11.9        Current Outpatient Medications on File Prior to Visit   Medication Sig Dispense Refill    metFORMIN (GLUCOPHAGE) 500 mg tablet Take 1 Tab by mouth two (2) times daily (with meals). 180 Tab 0    lisinopril (PRINIVIL, ZESTRIL) 10 mg tablet TAKE ONE TABLET BY MOUTH ONE TIME DAILY 90 Tab 0    pravastatin (PRAVACHOL) 40 mg tablet TAKE 1 TABLET BY MOUTH ONCE DAILY 90 Tab 0    VITAMIN D2 50,000 unit capsule TAKE 1 CAPSULE BY MOUTH ONCE A WEEK 4 Cap 1    [DISCONTINUED] levothyroxine (SYNTHROID) 88 mcg tablet TAKE ONE TABLET BY MOUTH ONCE DAILY BEFORE BREAKFAST Mon Wed Sat 1/2 tablet Tues and Thurs. 90 Tab 0    Blood-Glucose Meter monitoring kit Please use four times a day and as needed. Dx. E11.9 1 Kit 0    glucose blood VI test strips (BLOOD GLUCOSE TEST) strip Use to test blood sugars 3 times a day and as needed DX. E11.9 100 Strip 6    aspirin delayed-release (ASPIRIN EC) 81 mg tablet Take 81 mg by mouth daily.  levothyroxine (SYNTHROID) 88 mcg tablet TAKE 1 TABLET BY MOUTH ONCE DAILY BEFORE BREAKFAST FOR 90 DAYS 90 Tab 0    VITAMIN D3 1,000 unit tablet TAKE ONE TABLET BY MOUTH ONE TIME DAILY 30 Tab 2    Lancets misc Use to check blood sugars 3 times daily and as needed 200 Each 11     No current facility-administered medications on file prior to visit.         No Known Allergies    Past Medical History:   Diagnosis Date    Anemia     High cholesterol     Thyroid disease     tyroid nodule removed 1998       Past Surgical History:   Procedure Laterality Date    HX HEENT      THYROIDECTOMY         Family History   Problem Relation Age of Onset    Diabetes Mother     Hypertension Mother     High Cholesterol Mother     Diabetes Sister     Hypertension Sister     Stroke Sister     Diabetes Brother     Hypertension Brother     Diabetes Maternal Grandmother     Heart Disease Maternal Grandmother        Social History Socioeconomic History    Marital status: LEGALLY      Spouse name: Not on file    Number of children: Not on file    Years of education: Not on file    Highest education level: Not on file   Occupational History    Not on file   Social Needs    Financial resource strain: Not on file    Food insecurity:     Worry: Not on file     Inability: Not on file    Transportation needs:     Medical: Not on file     Non-medical: Not on file   Tobacco Use    Smoking status: Former Smoker     Packs/day: 0.50     Years: 30.00     Pack years: 15.00     Types: Cigarettes     Last attempt to quit: 3/1/2019     Years since quittin.7    Smokeless tobacco: Never Used   Substance and Sexual Activity    Alcohol use: No    Drug use: No    Sexual activity: Yes   Lifestyle    Physical activity:     Days per week: Not on file     Minutes per session: Not on file    Stress: Not on file   Relationships    Social connections:     Talks on phone: Not on file     Gets together: Not on file     Attends Spiritism service: Not on file     Active member of club or organization: Not on file     Attends meetings of clubs or organizations: Not on file     Relationship status: Not on file    Intimate partner violence:     Fear of current or ex partner: Not on file     Emotionally abused: Not on file     Physically abused: Not on file     Forced sexual activity: Not on file   Other Topics Concern    Not on file   Social History Narrative    Not on file       Office Visit on 2019   Component Date Value Ref Range Status    TSH 2019 0.009* 0.450 - 4.500 uIU/mL Final    Glucose 2019 118* 65 - 99 mg/dL Final    BUN 2019 14  8 - 27 mg/dL Final    Creatinine 2019 0.65  0.57 - 1.00 mg/dL Final    GFR est non-AA 2019 95  >59 mL/min/1.73 Final    GFR est AA 2019 109  >59 mL/min/1.73 Final    BUN/Creatinine ratio 2019 22  12 - 28 Final    Sodium 2019 141  134 - 144 mmol/L Final    Potassium 09/25/2019 4.1  3.5 - 5.2 mmol/L Final    Chloride 09/25/2019 104  96 - 106 mmol/L Final    CO2 09/25/2019 25  20 - 29 mmol/L Final    Calcium 09/25/2019 9.2  8.7 - 10.3 mg/dL Final    Protein, total 09/25/2019 6.8  6.0 - 8.5 g/dL Final    Albumin 09/25/2019 4.3  3.6 - 4.8 g/dL Final    GLOBULIN, TOTAL 09/25/2019 2.5  1.5 - 4.5 g/dL Final    A-G Ratio 09/25/2019 1.7  1.2 - 2.2 Final    Bilirubin, total 09/25/2019 0.3  0.0 - 1.2 mg/dL Final    Alk. phosphatase 09/25/2019 59  39 - 117 IU/L Final    AST (SGOT) 09/25/2019 13  0 - 40 IU/L Final    ALT (SGPT) 09/25/2019 10  0 - 32 IU/L Final    WBC 09/25/2019 4.3  3.4 - 10.8 x10E3/uL Final    RBC 09/25/2019 4.21  3.77 - 5.28 x10E6/uL Final    HGB 09/25/2019 12.6  11.1 - 15.9 g/dL Final    HCT 09/25/2019 38.4  34.0 - 46.6 % Final    MCV 09/25/2019 91  79 - 97 fL Final    MCH 09/25/2019 29.9  26.6 - 33.0 pg Final    MCHC 09/25/2019 32.8  31.5 - 35.7 g/dL Final    RDW 09/25/2019 13.4  12.3 - 15.4 % Final    PLATELET 17/98/2839 215  150 - 450 x10E3/uL Final    Hemoglobin A1c 09/25/2019 6.0* 4.8 - 5.6 % Final    Comment:          Prediabetes: 5.7 - 6.4           Diabetes: >6.4           Glycemic control for adults with diabetes: <7.0      Estimated average glucose 09/25/2019 126  mg/dL Final       Review of Systems   Constitutional: Negative for malaise/fatigue. HENT: Negative for congestion. Eyes: Negative for blurred vision. Respiratory: Negative for shortness of breath. Cardiovascular: Negative for chest pain and leg swelling. Gastrointestinal: Negative for constipation, diarrhea and heartburn. Genitourinary: Negative for dysuria, frequency and urgency. Musculoskeletal: Negative for joint pain and myalgias. Positive for BL shoulder pain (L>R). Neurological: Negative for dizziness and headaches. Psychiatric/Behavioral: Negative for depression and substance abuse.  The patient is not nervous/anxious and does not have insomnia. Visit Vitals  /83 (BP 1 Location: Left arm, BP Patient Position: Sitting)   Pulse 78   Temp 97.9 °F (36.6 °C) (Oral)   Resp 16   Ht 5' (1.524 m)   Wt 154 lb 12.8 oz (70.2 kg)   LMP 12/04/2012   SpO2 100%   BMI 30.23 kg/m²       Physical Exam  Nursing note reviewed. Constitutional:       General: She is not in acute distress. Appearance: Normal appearance. She is well-developed, well-groomed and overweight. She is not diaphoretic. HENT:      Head: Normocephalic and atraumatic. Right Ear: External ear normal.      Left Ear: External ear normal.   Eyes:      General:         Right eye: No discharge. Left eye: No discharge. Extraocular Movements: Extraocular movements intact. Conjunctiva/sclera: Conjunctivae normal.      Pupils: Pupils are equal, round, and reactive to light. Neck:      Musculoskeletal: Normal range of motion and neck supple. Cardiovascular:      Rate and Rhythm: Normal rate and regular rhythm. Heart sounds: Normal heart sounds. No murmur. No friction rub. No gallop. Pulmonary:      Effort: Pulmonary effort is normal.      Breath sounds: Normal breath sounds. No wheezing. Abdominal:      General: Bowel sounds are normal.      Palpations: Abdomen is soft. Tenderness: There is no tenderness. Musculoskeletal: Normal range of motion. Right shoulder: She exhibits tenderness (under L axilla ). Neurological:      Mental Status: She is oriented to person, place, and time. Psychiatric:         Mood and Affect: Mood and affect normal.         Behavior: Behavior normal.         Thought Content: Thought content normal.         ASSESSMENT and PLAN    ICD-10-CM ICD-9-CM    1. Hypertension, unspecified type I10 401.9 AMB POC URINE, MICROALBUMIN, SEMIQUANT (3 RESULTS)    POC UA ordered. Compliant on Lisinopril 10 mg. METABOLIC PANEL, COMPREHENSIVE      CBC WITH AUTOMATED DIFF    CMP and CBC ordered.     2. Type 2 diabetes with nephropathy (HCC) E11.21 250.40 AMB POC URINE, MICROALBUMIN, SEMIQUANT (3 RESULTS)    POC UA ordered. Compliant on Metformin 500 mg.     583.81    3. Chronic left shoulder pain M25.512 719.41 diflunisal (DOLOBID) 500 mg tab    Dolobid 500 mg prescribed. Potential side effects were discussed. Pt should take 1 tab BID x 10 days with breakfast and dinner. If pain does not improve, the pt should try PT.    G89.29 338.29 REFERRAL TO PHYSICAL THERAPY    Referred to PT. Advised pt to try PT if L shoulder pain does not improve with Dolobid. 4. Acquired hypothyroidism E03.9 244.9 TSH 3RD GENERATION    TSH ordered. Compliant on Synthroid 88 mcg. 5. Need for diphtheria-tetanus-pertussis (Tdap) vaccine Z23 V06.1 diph,Pertuss,[employer]cell,,Tet Vac-PF (ADACEL) 2 Lf-(2.5-5-3-5 mcg)-5Lf/0.5 mL susp sent to pharmacy    Tdap vaccine ordered. This plan was reviewed with the patient and patient agrees. All questions were answered. This scribe documentation was reviewed by me and accurately reflects the examination and decisions made by me. This note will not be viewable in [address]

## 2019-12-06 NOTE — LETTER
NOTIFICATION RETURN TO WORK / SCHOOL 
 
12/6/2019 1:17 PM 
 
Ms. Rlaph Plunkett [address] To Whom It May Concern: 
 
Ralph Plunkett is currently under the care of Charanjit Love. She was seen in the office for Diabetes follow up. We recommended frequent snacks during the day time to prevent low sugar levels ( Hypoglycemia). If there are questions or concerns please have the patient contact our office. Sincerely, Kermit Diaz MD

## 2019-12-06 NOTE — PROGRESS NOTES
Visit Vitals  /83 (BP 1 Location: Left arm, BP Patient Position: Sitting)   Pulse 78   Temp 97.9 °F (36.6 °C) (Oral)   Resp 16   Ht 5' (1.524 m)   Wt 154 lb 12.8 oz (70.2 kg)   SpO2 100%   BMI 30.23 kg/m²         Chief Complaint   Patient presents with    Referral Follow Up     Discuss Colonoscopy Results    Shoulder Pain     Bilateral deltoid/Shoulder Pain, intermittent, at present 6/10    Documentation     Patient needs letter stating she is allowed frequent small meals/snacks throughout the day d/t Diabetes for Employer               Due reviewed and WNL. Patient requests refill of levothyroxine pended to Provider. Patient states she also needs a letter for Employer indicating she needs small meals/snacks throughout the day due to her diabetes. 1. Have you been to the ER, urgent care clinic since your last visit? Hospitalized since your last visit? Denies     2. Have you seen or consulted any other health care providers outside of the [address] [place] [address] since your last visit? Include any pap smears or colon screening.  GI Specialist

## 2019-12-07 LAB
ALBUMIN SERPL-MCNC: 4.6 G/DL (ref 3.6–4.8)
ALBUMIN/GLOB SERPL: 1.7 {RATIO} (ref 1.2–2.2)
ALP SERPL-CCNC: 79 IU/L (ref 39–117)
ALT SERPL-CCNC: 13 IU/L (ref 0–32)
AST SERPL-CCNC: 12 IU/L (ref 0–40)
BASOPHILS # BLD AUTO: 0 X10E3/UL (ref 0–0.2)
BASOPHILS NFR BLD AUTO: 1 %
BILIRUB SERPL-MCNC: 0.5 MG/DL (ref 0–1.2)
BUN SERPL-MCNC: 14 MG/DL (ref 8–27)
BUN/CREAT SERPL: 21 (ref 12–28)
CALCIUM SERPL-MCNC: 9.3 MG/DL (ref 8.7–10.3)
CHLORIDE SERPL-SCNC: 103 MMOL/L (ref 96–106)
CO2 SERPL-SCNC: 22 MMOL/L (ref 20–29)
CREAT SERPL-MCNC: 0.68 MG/DL (ref 0.57–1)
EOSINOPHIL # BLD AUTO: 0.3 X10E3/UL (ref 0–0.4)
EOSINOPHIL NFR BLD AUTO: 6 %
ERYTHROCYTE [DISTWIDTH] IN BLOOD BY AUTOMATED COUNT: 13.2 % (ref 12.3–15.4)
GLOBULIN SER CALC-MCNC: 2.7 G/DL (ref 1.5–4.5)
GLUCOSE SERPL-MCNC: 83 MG/DL (ref 65–99)
HCT VFR BLD AUTO: 39.6 % (ref 34–46.6)
HGB BLD-MCNC: 13.2 G/DL (ref 11.1–15.9)
IMM GRANULOCYTES # BLD AUTO: 0 X10E3/UL (ref 0–0.1)
IMM GRANULOCYTES NFR BLD AUTO: 0 %
LYMPHOCYTES # BLD AUTO: 1.9 X10E3/UL (ref 0.7–3.1)
LYMPHOCYTES NFR BLD AUTO: 44 %
MCH RBC QN AUTO: 29.9 PG (ref 26.6–33)
MCHC RBC AUTO-ENTMCNC: 33.3 G/DL (ref 31.5–35.7)
MCV RBC AUTO: 90 FL (ref 79–97)
MONOCYTES # BLD AUTO: 0.4 X10E3/UL (ref 0.1–0.9)
MONOCYTES NFR BLD AUTO: 8 %
NEUTROPHILS # BLD AUTO: 1.9 X10E3/UL (ref 1.4–7)
NEUTROPHILS NFR BLD AUTO: 41 %
PLATELET # BLD AUTO: 258 X10E3/UL (ref 150–450)
POTASSIUM SERPL-SCNC: 4.6 MMOL/L (ref 3.5–5.2)
PROT SERPL-MCNC: 7.3 G/DL (ref 6–8.5)
RBC # BLD AUTO: 4.42 X10E6/UL (ref 3.77–5.28)
SODIUM SERPL-SCNC: 140 MMOL/L (ref 134–144)
TSH SERPL DL<=0.005 MIU/L-ACNC: 0.26 UIU/ML (ref 0.45–4.5)
WBC # BLD AUTO: 4.5 X10E3/UL (ref 3.4–10.8)

## 2019-12-11 DIAGNOSIS — G89.29 CHRONIC LEFT SHOULDER PAIN: ICD-10-CM

## 2019-12-11 DIAGNOSIS — M25.512 CHRONIC LEFT SHOULDER PAIN: ICD-10-CM

## 2019-12-11 DIAGNOSIS — E55.9 VITAMIN D DEFICIENCY: ICD-10-CM

## 2019-12-11 RX ORDER — DIFLUNISAL 500 MG/1
500 TABLET, FILM COATED ORAL 2 TIMES DAILY
Qty: 20 TAB | Refills: 0 | Status: SHIPPED | OUTPATIENT
Start: 2019-12-11 | End: 2019-12-21

## 2019-12-11 RX ORDER — ERGOCALCIFEROL 1.25 MG/1
CAPSULE ORAL
Qty: 4 CAP | Refills: 1 | Status: SHIPPED | OUTPATIENT
Start: 2019-12-11 | End: 2020-02-23

## 2019-12-11 NOTE — TELEPHONE ENCOUNTER
Patient needs her \"arm pain medication\" sent to [facility] on [address].  Other pharmacy can not get it until January

## 2020-01-15 ENCOUNTER — OFFICE VISIT (OUTPATIENT)
Dept: PRIMARY CARE CLINIC | Age: 65
End: 2020-01-15

## 2020-01-15 VITALS
HEIGHT: 60 IN | WEIGHT: 157.2 LBS | SYSTOLIC BLOOD PRESSURE: 117 MMHG | OXYGEN SATURATION: 99 % | HEART RATE: 85 BPM | BODY MASS INDEX: 30.86 KG/M2 | RESPIRATION RATE: 16 BRPM | DIASTOLIC BLOOD PRESSURE: 78 MMHG | TEMPERATURE: 98.1 F

## 2020-01-15 DIAGNOSIS — R09.81 NASAL CONGESTION: ICD-10-CM

## 2020-01-15 DIAGNOSIS — N30.01 ACUTE CYSTITIS WITH HEMATURIA: Primary | ICD-10-CM

## 2020-01-15 DIAGNOSIS — B37.31 VAGINAL CANDIDIASIS: ICD-10-CM

## 2020-01-15 DIAGNOSIS — M79.10 MYALGIA: ICD-10-CM

## 2020-01-15 DIAGNOSIS — J01.00 SUBACUTE MAXILLARY SINUSITIS: ICD-10-CM

## 2020-01-15 DIAGNOSIS — E11.9 WELL CONTROLLED DIABETES MELLITUS (HCC): ICD-10-CM

## 2020-01-15 LAB
ALBUMIN UR QL STRIP: 10 MG/L
BACTERIA UA POCT, BACTPOCT: NORMAL
BILIRUB UR QL STRIP: NEGATIVE
CASTS UA POCT: NORMAL
CLUE CELLS, CLUEPOCT: NORMAL
CREATININE, URINE POC: 8.8 MG/DL
CRYSTALS UA POCT, CRYSPOCT: NORMAL
EPITHELIAL CELLS POCT: NORMAL
GLUCOSE UR-MCNC: NEGATIVE MG/DL
KETONES P FAST UR STRIP-MCNC: NEGATIVE MG/DL
MICROALBUMIN/CREAT RATIO POC: <30 MG/G
MUCUS UA POCT, MUCPOCT: NORMAL
PH UR STRIP: 5.5 [PH] (ref 4.6–8)
PROT UR QL STRIP: NEGATIVE
RBC UA POCT, RBCPOCT: NORMAL
SP GR UR STRIP: 1.02 (ref 1–1.03)
TRICH UA POCT, TRICHPOC: NORMAL
UA UROBILINOGEN AMB POC: NORMAL (ref 0.2–1)
URINALYSIS CLARITY POC: NORMAL
URINALYSIS COLOR POC: NORMAL
URINE BLOOD POC: NORMAL
URINE CULT COMMENT, POCT: NORMAL
URINE LEUKOCYTES POC: NORMAL
URINE NITRITES POC: POSITIVE
WBC UA POCT, WBCPOCT: NORMAL
YEAST UA POCT, YEASTPOC: NORMAL

## 2020-01-15 RX ORDER — FLUCONAZOLE 150 MG/1
150 TABLET ORAL DAILY
Qty: 3 TAB | Refills: 0 | Status: SHIPPED | OUTPATIENT
Start: 2020-01-15 | End: 2020-01-18

## 2020-01-15 RX ORDER — MOMETASONE FUROATE 50 UG/1
2 SPRAY, METERED NASAL DAILY
Qty: 1 CONTAINER | Refills: 0 | Status: SHIPPED | OUTPATIENT
Start: 2020-01-15 | End: 2020-01-25

## 2020-01-15 RX ORDER — CEFUROXIME AXETIL 500 MG/1
500 TABLET ORAL 2 TIMES DAILY
Qty: 20 TAB | Refills: 0 | Status: SHIPPED | OUTPATIENT
Start: 2020-01-15 | End: 2020-01-25

## 2020-01-15 RX ORDER — DIFLUNISAL 500 MG/1
500 TABLET, FILM COATED ORAL 2 TIMES DAILY
Qty: 30 TAB | Refills: 0 | Status: SHIPPED | OUTPATIENT
Start: 2020-01-15 | End: 2020-01-28

## 2020-01-15 NOTE — PROGRESS NOTES
Identified pt with two pt identifiers(name and ). Reviewed record in preparation for visit and have obtained necessary documentation. Chief Complaint   Patient presents with    Sinus Pain     ongoing for 3 weeks    Vaginal Itching     and discharge for over a week    Ear Pain     left ear    Muscle Pain        Health Maintenance Due   Topic    Pneumococcal 0-64 years (1 of 1 - PPSV23)    DTaP/Tdap/Td series (1 - Tdap)    Shingrix Vaccine Age 50> (1 of 2)    MICROALBUMIN Q1        Coordination of Care Questionnaire:  :   1) Have you been to an emergency room, urgent care, or hospitalized since your last visit? If yes, where when, and reason for visit? no       2. Have seen or consulted any other health care provider since your last visit? If yes, where when, and reason for visit? NO      3) Do you have an Advanced Directive/ Living Will in place? NO  If yes, do we have a copy on file NO  If no, would you like information NO    Patient is accompanied by self I have received verbal consent from Phillip Leger to discuss any/all medical information while they are present in the room.     Visit Vitals  /78 (BP 1 Location: Left arm, BP Patient Position: Sitting)   Pulse 85   Temp 98.1 °F (36.7 °C) (Oral)   Resp 16   Ht 5' (1.524 m)   Wt 157 lb 3.2 oz (71.3 kg)   SpO2 99%   BMI 30.70 kg/m²     Rome Thacker LPN

## 2020-01-15 NOTE — PROGRESS NOTES
Written by Adilia Arceo, as dictated by Dr. Adriana Zhao MD.    Maribeth Valerio is a 62 y.o. female. HPI  The patient presents today c/o vaginal itching and white discharge x 1 week. She had sexual intercourse on New Years Jolene, and states she did wash after. About 1 week later, she felt heaviness in the area, and then followed by discharge and vaginal itching. She believes it is a yeast infection or UTI, as she previously had a Trichomonas infection in the past. Denies dysuria. She tried taking AZO and drinking plenty of water but the symptoms have not resolved. She c/o sinus pain x 3 weeks. She has had about 3 episodes where she would wake up in the morning, feeling her nose is dry, and when she wipes her nose, there is blood. She thought it might be due to a sinus infection or allergies, but is not sure what is causing it. She also endorses intermittent L ear pain, and an episode of dizziness while at work. She notes some of her coworkers have been sick recently. Denies fevers. She does not have nasal spray. She also reports her BL arm muscle pain is still present, though improved. She was previously given Dolobid for the pain, which helps, but she notes that she does not take it at night, as it does not make her feel well if she doesn't eat something with it. She was previously advised that if the pain did not resolve, she should go to physical therapy for the pain. She is also taking Aleve at night, which helps with the pain. She believes she has gained weight. She weighs 157 lbs today and weighed 154 lbs on 12/06/19. She eats breakfast in the mornings to avoid hypoglycemic episodes. Compliant on Metformin 500 mg BID, and Pravastatin 40 mg. Compliant on Synthroid 88 mcg as prescribed: 1/2 tab T/Th, and 1 tab all other days. BP in office looks good today. Compliant on Lisinopril 10 mg.     She received her flu shot this year, as well as her pneumonia vaccine. She has placed herself on her list to receive the Shingrix vaccine. She recently received a promotion in her workplace, and is now working as a night manager at the [employer]. Patient Active Problem List   Diagnosis Code    Hypothyroidism E03.9    Anemia D64.9    Hyperlipidemia E78.5    Essential hypertension I10    Well controlled diabetes mellitus ([facility] [address] E11.9        Current Outpatient Medications on File Prior to Visit   Medication Sig Dispense Refill    VITAMIN D2 50,000 unit capsule TAKE 1 CAPSULE BY MOUTH ONCE A WEEK 4 Cap 1    levothyroxine (SYNTHROID) 88 mcg tablet TAKE ONE TABLET BY MOUTH ONCE DAILY BEFORE BREAKFAST Mon Wed Sat 1/2 tablet Tues and Thurs. 90 Tab 0    metFORMIN (GLUCOPHAGE) 500 mg tablet Take 1 Tab by mouth two (2) times daily (with meals). 180 Tab 0    lisinopril (PRINIVIL, ZESTRIL) 10 mg tablet TAKE ONE TABLET BY MOUTH ONE TIME DAILY 90 Tab 0    pravastatin (PRAVACHOL) 40 mg tablet TAKE 1 TABLET BY MOUTH ONCE DAILY 90 Tab 0    Blood-Glucose Meter monitoring kit Please use four times a day and as needed. Dx. E11.9 1 Kit 0    glucose blood VI test strips (BLOOD GLUCOSE TEST) strip Use to test blood sugars 3 times a day and as needed DX. E11.9 100 Strip 6    Lancets misc Use to check blood sugars 3 times daily and as needed 200 Each 11    aspirin delayed-release (ASPIRIN EC) 81 mg tablet Take 81 mg by mouth daily.  levothyroxine (SYNTHROID) 88 mcg tablet TAKE 1 TABLET BY MOUTH ONCE DAILY BEFORE BREAKFAST FOR 90 DAYS 90 Tab 0    VITAMIN D3 1,000 unit tablet TAKE ONE TABLET BY MOUTH ONE TIME DAILY 30 Tab 2     No current facility-administered medications on file prior to visit.         Past Medical History:   Diagnosis Date    Anemia     High cholesterol     Thyroid disease     tyroid nodule removed 1998       Past Surgical History:   Procedure Laterality Date    HX HEENT      THYROIDECTOMY         Family History   Problem Relation Age of Onset    Diabetes Mother     Hypertension Mother     High Cholesterol Mother     Diabetes Sister     Hypertension Sister     Stroke Sister     Diabetes Brother     Hypertension Brother     Diabetes Maternal Grandmother     Heart Disease Maternal Grandmother        Social History     Socioeconomic History    Marital status: LEGALLY      Spouse name: Not on file    Number of children: Not on file    Years of education: Not on file    Highest education level: Not on file   Occupational History    Not on file   Social Needs    Financial resource strain: Not on file    Food insecurity:     Worry: Not on file     Inability: Not on file    Transportation needs:     Medical: Not on file     Non-medical: Not on file   Tobacco Use    Smoking status: Former Smoker     Packs/day: 0.50     Years: 30.00     Pack years: 15.00     Types: Cigarettes     Last attempt to quit: 3/1/2019     Years since quittin.8    Smokeless tobacco: Never Used   Substance and Sexual Activity    Alcohol use: No    Drug use: No    Sexual activity: Yes   Lifestyle    Physical activity:     Days per week: Not on file     Minutes per session: Not on file    Stress: Not on file   Relationships    Social connections:     Talks on phone: Not on file     Gets together: Not on file     Attends [employer] service: Not on file     Active member of club or organization: Not on file     Attends meetings of clubs or organizations: Not on file     Relationship status: Not on file    Intimate partner violence:     Fear of current or ex partner: Not on file     Emotionally abused: Not on file     Physically abused: Not on file     Forced sexual activity: Not on file   Other Topics Concern    Not on file   Social History Narrative    Not on file       Office Visit on 01/15/2020   Component Date Value Ref Range Status    ALBUMIN, URINE POC 01/15/2020 10  Negative mg/L Final    CREATININE, URINE POC 01/15/2020 8.8  mg/dL Final    Microalbumin/creat ratio (POC) 01/15/2020 <30  <30 MG/G Final    <3.4   Office Visit on 12/06/2019   Component Date Value Ref Range Status    Glucose 12/06/2019 83  65 - 99 mg/dL Final    BUN 12/06/2019 14  8 - 27 mg/dL Final    Creatinine 12/06/2019 0.68  0.57 - 1.00 mg/dL Final    GFR est non-AA 12/06/2019 93  >59 mL/min/1.73 Final    GFR est AA 12/06/2019 108  >59 mL/min/1.73 Final    BUN/Creatinine ratio 12/06/2019 21  12 - 28 Final    Sodium 12/06/2019 140  134 - 144 mmol/L Final    Potassium 12/06/2019 4.6  3.5 - 5.2 mmol/L Final    Chloride 12/06/2019 103  96 - 106 mmol/L Final    CO2 12/06/2019 22  20 - 29 mmol/L Final    Calcium 12/06/2019 9.3  8.7 - 10.3 mg/dL Final    Protein, total 12/06/2019 7.3  6.0 - 8.5 g/dL Final    Albumin 12/06/2019 4.6  3.6 - 4.8 g/dL Final    GLOBULIN, TOTAL 12/06/2019 2.7  1.5 - 4.5 g/dL Final    A-G Ratio 12/06/2019 1.7  1.2 - 2.2 Final    Bilirubin, total 12/06/2019 0.5  0.0 - 1.2 mg/dL Final    Alk. phosphatase 12/06/2019 79  39 - 117 IU/L Final    AST (SGOT) 12/06/2019 12  0 - 40 IU/L Final    ALT (SGPT) 12/06/2019 13  0 - 32 IU/L Final    WBC 12/06/2019 4.5  3.4 - 10.8 x10E3/uL Final    RBC 12/06/2019 4.42  3.77 - 5.28 x10E6/uL Final    HGB 12/06/2019 13.2  11.1 - 15.9 g/dL Final    HCT 12/06/2019 39.6  34.0 - 46.6 % Final    MCV 12/06/2019 90  79 - 97 fL Final    MCH 12/06/2019 29.9  26.6 - 33.0 pg Final    MCHC 12/06/2019 33.3  31.5 - 35.7 g/dL Final    RDW 12/06/2019 13.2  12.3 - 15.4 % Final    PLATELET 00/25/6163 535  150 - 450 x10E3/uL Final    NEUTROPHILS 12/06/2019 41  Not Estab. % Final    Lymphocytes 12/06/2019 44  Not Estab. % Final    MONOCYTES 12/06/2019 8  Not Estab. % Final    EOSINOPHILS 12/06/2019 6  Not Estab. % Final    BASOPHILS 12/06/2019 1  Not Estab. % Final    ABS. NEUTROPHILS 12/06/2019 1.9  1.4 - 7.0 x10E3/uL Final    Abs Lymphocytes 12/06/2019 1.9  0.7 - 3.1 x10E3/uL Final    ABS.  MONOCYTES 12/06/2019 0.4  0.1 - 0.9 x10E3/uL Final    ABS. EOSINOPHILS 12/06/2019 0.3  0.0 - 0.4 x10E3/uL Final    ABS. BASOPHILS 12/06/2019 0.0  0.0 - 0.2 x10E3/uL Final    IMMATURE GRANULOCYTES 12/06/2019 0  Not Estab. % Final    ABS. IMM. GRANS. 12/06/2019 0.0  0.0 - 0.1 x10E3/uL Final    TSH 12/06/2019 0.257* 0.450 - 4.500 uIU/mL Final       Review of Systems   Constitutional: Negative for fever, malaise/fatigue and weight loss. HENT: Positive for ear pain (L ear), nosebleeds (intermittent) and sinus pain. Negative for congestion and hearing loss. Eyes: Negative for blurred vision and photophobia. Respiratory: Negative for cough and shortness of breath. Cardiovascular: Negative for chest pain and leg swelling. Gastrointestinal: Negative for constipation, diarrhea and heartburn. Genitourinary: Negative for dysuria, frequency and urgency. + vaginal itching and discharge   Musculoskeletal: Positive for myalgias. Negative for joint pain. Neurological: Negative for dizziness and headaches. Psychiatric/Behavioral: Negative for depression and substance abuse. The patient is not nervous/anxious and does not have insomnia. Visit Vitals  /78 (BP 1 Location: Left arm, BP Patient Position: Sitting)   Pulse 85   Temp 98.1 °F (36.7 °C) (Oral)   Resp 16   Ht 5' (1.524 m)   Wt 157 lb 3.2 oz (71.3 kg)   LMP 12/04/2012   SpO2 99%   BMI 30.70 kg/m²       Physical Exam  Vitals signs and nursing note reviewed. Constitutional:       General: She is not in acute distress. Appearance: Normal appearance. She is well-developed and well-groomed. She is obese. She is not diaphoretic. HENT:      Head: Normocephalic and atraumatic. Right Ear: Tympanic membrane, ear canal and external ear normal.      Left Ear: Tympanic membrane, ear canal and external ear normal.      Ears:      Comments: + L ear with fluid behind TM     Nose:      Right Sinus: Maxillary sinus tenderness (mild) present.       Left Sinus: Maxillary sinus tenderness (mild) present. Mouth/Throat:      Mouth: Mucous membranes are moist.      Pharynx: Oropharynx is clear. Posterior oropharyngeal erythema (mild) present. Eyes:      General:         Right eye: No discharge. Left eye: No discharge. Conjunctiva/sclera: Conjunctivae normal.      Pupils: Pupils are equal, round, and reactive to light. Neck:      Musculoskeletal: Normal range of motion and neck supple. Cardiovascular:      Rate and Rhythm: Normal rate and regular rhythm. Heart sounds: Normal heart sounds. No murmur. No friction rub. No gallop. Pulmonary:      Effort: Pulmonary effort is normal.      Breath sounds: Normal breath sounds. No wheezing. Abdominal:      General: Bowel sounds are normal.      Palpations: Abdomen is soft. Tenderness: There is no tenderness. Musculoskeletal: Normal range of motion. Lymphadenopathy:      Cervical: No cervical adenopathy. Neurological:      Mental Status: She is alert and oriented to person, place, and time. Deep Tendon Reflexes: Reflexes are normal and symmetric. Psychiatric:         Behavior: Behavior normal.         Thought Content: Thought content normal.         ASSESSMENT and PLAN    ICD-10-CM ICD-9-CM    1. Acute cystitis with hematuria N30.01 595.0 cefUROXime (CEFTIN) 500 mg tablet sent to pharmacy. CULTURE, URINE      AMB POC URINALYSIS DIP STICK AUTO W/ MICRO     POC UA showed Nitrites Positive, Leukocyte esterase 3+, and Blood Trace. Urine Culture ordered. Based on abx sensitivity results from culture, may prescribe a different antibiotic. Ceftin 500 mg prescribed. Potential side effects were discussed. Pt should take 1 tab BID x 10 days. Advised patient to drink plenty of water. 2. Nasal congestion R09.81 478.19 mometasone (NASONEX) 50 mcg/actuation nasal spray sent to pharmacy. Nasonex nasal spray prescribed. Potential side effects were discussed.  Pt should use 1 spray per nostril daily. If symptoms do not improve, pt should let me know, and I will prescribe an antibiotic. Discussed that congestion may be the cause of her current sinus pain and fluid in her ear. 3. Subacute maxillary sinusitis J01.00 461.0 mometasone (NASONEX) 50 mcg/actuation nasal spray sent to pharmacy. Nasonex nasal spray prescribed. Potential side effects were discussed. Pt should use 1 spray per nostril daily. If symptoms do not improve, pt should let me know, and I will prescribe an antibiotic. Discussed that congestion may be the cause of her current sinus pain and fluid in her ear. 4. Well controlled diabetes mellitus ([facility] [address] E11.9 250.00 AMB POC URINE, MICROALBUMIN, SEMIQUANT (3 RESULTS)    POC microalbumin tested in office showed Albumin 10, Urine Creatinine 8.8, and Microalbumin/creat ratio < 30. Reviewed and decided to continue present medications. 5. Vaginal candidiasis B37.3 112.1 fluconazole (DIFLUCAN) 150 mg tablet sent to pharmacy. Diflucan 150 mg prescribed. Potential side effects were discussed. Pt should take 1 tab x 1 day. Advised patient to wait on taking Diflucan until after finishing course of antibiotics, as ABX can cause a yeast infection. 6. Myalgia M79.10 729.1 diflunisal (DOLOBID) 500 mg tab sent to pharmacy. Dolobid 500 mg prescribed. Potential side effects were discussed. Pt should take 1 tab BID with food as needed for pain. This plan was reviewed with the patient and patient agrees. All questions were answered. This scribe documentation was reviewed by me and accurately reflects the examination and decisions made by me. This note will not be viewable in [address]

## 2020-01-17 LAB — BACTERIA UR CULT: NO GROWTH

## 2020-01-28 DIAGNOSIS — M79.10 MYALGIA: ICD-10-CM

## 2020-01-28 RX ORDER — DIFLUNISAL 500 MG/1
TABLET, FILM COATED ORAL
Qty: 20 TAB | Refills: 0 | Status: SHIPPED | OUTPATIENT
Start: 2020-01-28 | End: 2020-11-04 | Stop reason: ALTCHOICE

## 2020-02-18 DIAGNOSIS — I10 HYPERTENSION, UNSPECIFIED TYPE: ICD-10-CM

## 2020-02-18 DIAGNOSIS — E11.21 TYPE 2 DIABETES WITH NEPHROPATHY (HCC): ICD-10-CM

## 2020-02-18 RX ORDER — METFORMIN HYDROCHLORIDE 500 MG/1
TABLET ORAL
Qty: 180 TAB | Refills: 0 | Status: SHIPPED | OUTPATIENT
Start: 2020-02-18 | End: 2020-04-30

## 2020-02-18 RX ORDER — LISINOPRIL 10 MG/1
TABLET ORAL
Qty: 90 TAB | Refills: 0 | Status: SHIPPED | OUTPATIENT
Start: 2020-02-18 | End: 2020-04-30

## 2020-02-23 DIAGNOSIS — E78.00 HYPERCHOLESTEROLEMIA: ICD-10-CM

## 2020-02-23 DIAGNOSIS — E55.9 VITAMIN D DEFICIENCY: ICD-10-CM

## 2020-02-23 RX ORDER — PRAVASTATIN SODIUM 40 MG/1
TABLET ORAL
Qty: 90 TAB | Refills: 0 | Status: SHIPPED | OUTPATIENT
Start: 2020-02-23 | End: 2020-05-31

## 2020-02-23 RX ORDER — ERGOCALCIFEROL 1.25 MG/1
CAPSULE ORAL
Qty: 4 CAP | Refills: 0 | Status: SHIPPED | OUTPATIENT
Start: 2020-02-23 | End: 2020-07-07 | Stop reason: ALTCHOICE

## 2020-03-12 DIAGNOSIS — E03.9 ACQUIRED HYPOTHYROIDISM: ICD-10-CM

## 2020-03-12 RX ORDER — LEVOTHYROXINE SODIUM 88 UG/1
TABLET ORAL
Qty: 90 TAB | Refills: 1 | Status: SHIPPED | OUTPATIENT
Start: 2020-03-12 | End: 2020-08-23

## 2020-03-16 ENCOUNTER — OFFICE VISIT (OUTPATIENT)
Dept: PRIMARY CARE CLINIC | Age: 65
End: 2020-03-16

## 2020-03-16 VITALS
HEART RATE: 77 BPM | WEIGHT: 157.8 LBS | DIASTOLIC BLOOD PRESSURE: 81 MMHG | RESPIRATION RATE: 18 BRPM | TEMPERATURE: 97.9 F | HEIGHT: 60 IN | BODY MASS INDEX: 30.98 KG/M2 | SYSTOLIC BLOOD PRESSURE: 133 MMHG | OXYGEN SATURATION: 99 %

## 2020-03-16 DIAGNOSIS — I10 ESSENTIAL HYPERTENSION: ICD-10-CM

## 2020-03-16 DIAGNOSIS — M25.512 CHRONIC LEFT SHOULDER PAIN: Primary | ICD-10-CM

## 2020-03-16 DIAGNOSIS — E03.9 ACQUIRED HYPOTHYROIDISM: ICD-10-CM

## 2020-03-16 DIAGNOSIS — M79.604 PAIN IN BOTH LOWER EXTREMITIES: ICD-10-CM

## 2020-03-16 DIAGNOSIS — M79.605 PAIN IN BOTH LOWER EXTREMITIES: ICD-10-CM

## 2020-03-16 DIAGNOSIS — E55.9 VITAMIN D DEFICIENCY: ICD-10-CM

## 2020-03-16 DIAGNOSIS — E11.9 WELL CONTROLLED DIABETES MELLITUS (HCC): ICD-10-CM

## 2020-03-16 DIAGNOSIS — G89.29 CHRONIC LEFT SHOULDER PAIN: Primary | ICD-10-CM

## 2020-03-16 NOTE — PROGRESS NOTES
Written by Elsa Foreman, as dictated by Dr. Wendi Dorman MD.    Phillip Tompkins is a 62 y.o. female. HPI  The patient presents today for follow-up. Pt states that her bilateral upper extremities (L>R) continue to be painful and achy. She also notes a strange sensation through her bilateral legs intermittently, especially at night. Pt is planning on following up with Dr. Jolene Castro (Ortho) on 03/24/2020. She has been taking Dolobid 500 mg and Aleve with temporary relief of her symptoms. Of note, she has been compliant on Vitamin D supplements as prescribed. 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 Pt notes recent eye exam since her last visit that was normal.    Pt has not received a Tdap recently, and she also did not get her Shingrix vaccine. However, she did get her flu vaccine this year. She has quit smoking. Patient Active Problem List   Diagnosis Code    Hypothyroidism E03.9    Anemia D64.9    Hyperlipidemia E78.5    Essential hypertension I10    Well controlled diabetes mellitus ([facility] [address] E11.9        Current Outpatient Medications on File Prior to Visit   Medication Sig Dispense Refill    VITAMIN D2 1,250 mcg (50,000 unit) capsule TAKE 1 CAPSULE BY MOUTH ONCE A WEEK 4 Cap 0    pravastatin (PRAVACHOL) 40 mg tablet TAKE 1 TABLET BY MOUTH ONCE DAILY 90 Tab 0    lisinopril (PRINIVIL, ZESTRIL) 10 mg tablet TAKE ONE TABLET BY MOUTH ONE TIME DAILY 90 Tab 0    metFORMIN (GLUCOPHAGE) 500 mg tablet TAKE ONE TABLET BY MOUTH TWICE A DAY WITH MEAL(S) 180 Tab 0    diflunisaL (DOLOBID) 500 mg tab TAKE 1 TABLET BY MOUTH TWICE DAILY FOR 10 DAYS 20 Tab 0    levothyroxine (SYNTHROID) 88 mcg tablet TAKE ONE TABLET BY MOUTH ONCE DAILY BEFORE BREAKFAST Mon Wed Sat 1/2 tablet Tues and Thurs. 90 Tab 0    Blood-Glucose Meter monitoring kit Please use four times a day and as needed.  Dx. E11.9 1 Kit 0    glucose blood VI test strips (BLOOD GLUCOSE TEST) strip Use to test blood sugars 3 times a day and as needed DX. E11.9 100 Strip 6    Lancets misc Use to check blood sugars 3 times daily and as needed 200 Each 11    aspirin delayed-release (ASPIRIN EC) 81 mg tablet Take 81 mg by mouth daily.  levothyroxine (SYNTHROID) 88 mcg tablet TAKE 1 TABLET BY MOUTH ONCE DAILY BEFORE BREAKFAST FOR 90 DAYS (Patient not taking: Reported on 3/16/2020) 90 Tab 1    VITAMIN D3 1,000 unit tablet TAKE ONE TABLET BY MOUTH ONE TIME DAILY (Patient not taking: Reported on 3/16/2020) 30 Tab 2     No current facility-administered medications on file prior to visit.         No Known Allergies    Past Medical History:   Diagnosis Date    Anemia     High cholesterol     Thyroid disease     tyroid nodule removed        Past Surgical History:   Procedure Laterality Date    HX HEENT      THYROIDECTOMY         Family History   Problem Relation Age of Onset    Diabetes Mother     Hypertension Mother     High Cholesterol Mother     Diabetes Sister     Hypertension Sister     Stroke Sister     Diabetes Brother     Hypertension Brother     Diabetes Maternal Grandmother     Heart Disease Maternal Grandmother        Social History     Socioeconomic History    Marital status: LEGALLY      Spouse name: Not on file    Number of children: Not on file    Years of education: Not on file    Highest education level: Not on file   Occupational History    Not on file   Social Needs    Financial resource strain: Not on file    Food insecurity     Worry: Not on file     Inability: Not on file    Transportation needs     Medical: Not on file     Non-medical: Not on file   Tobacco Use    Smoking status: Former Smoker     Packs/day: 0.50     Years: 30.00     Pack years: 15.00     Types: Cigarettes     Last attempt to quit: 3/1/2019     Years since quittin.0    Smokeless tobacco: Never Used   Substance and Sexual Activity    Alcohol use: No    Drug use: No    Sexual activity: Yes   Lifestyle    Physical activity     Days per week: Not on file     Minutes per session: Not on file    Stress: Not on file   Relationships    Social connections     Talks on phone: Not on file     Gets together: Not on file     Attends [employer] service: Not on file     Active member of club or organization: Not on file     Attends meetings of clubs or organizations: Not on file     Relationship status: Not on file    Intimate partner violence     Fear of current or ex partner: Not on file     Emotionally abused: Not on file     Physically abused: Not on file     Forced sexual activity: Not on file   Other Topics Concern    Not on file   Social History Narrative    Not on file       Office Visit on 01/15/2020   Component Date Value Ref Range Status    ALBUMIN, URINE POC 01/15/2020 10  Negative mg/L Final    CREATININE, URINE POC 01/15/2020 8.8  mg/dL Final    Microalbumin/creat ratio (POC) 01/15/2020 <30  <30 MG/G Final    <3.4    Urine Culture, Routine 01/15/2020 No growth   Final    Color (UA POC) 01/15/2020 Dark Yellow   Final    Clarity (UA POC) 01/15/2020 Slightly Cloudy   Final    Glucose (UA POC) 01/15/2020 Negative  Negative Final    Bilirubin (UA POC) 01/15/2020 Negative  Negative Final    Ketones (UA POC) 01/15/2020 Negative  Negative Final    Specific gravity (UA POC) 01/15/2020 1.020  1.001 - 1.035 Final    Blood (UA POC) 01/15/2020 Trace  Negative Final    pH (UA POC) 01/15/2020 5.5  4.6 - 8.0 Final    Protein (UA POC) 01/15/2020 Negative  Negative Final    Urobilinogen (UA POC) 01/15/2020 0.2 mg/dL  0.2 - 1 Final    Nitrites (UA POC) 01/15/2020 Positive  Negative Final    Leukocyte esterase (UA POC) 01/15/2020 3+  Negative Final       Review of Systems   Constitutional: Negative for malaise/fatigue and weight loss. HENT: Negative for congestion and hearing loss.     Eyes: Negative for blurred vision and photophobia. Respiratory: Negative for cough and shortness of breath. Cardiovascular: Negative for chest pain and leg swelling. Gastrointestinal: Negative for constipation, diarrhea and heartburn. Genitourinary: Negative for dysuria, frequency and urgency. Musculoskeletal: Positive for myalgias. Negative for joint pain. Neurological: Negative for dizziness and headaches. Psychiatric/Behavioral: Negative for depression and substance abuse. The patient is not nervous/anxious and does not have insomnia. Visit Vitals  /81 (BP 1 Location: Right arm, BP Patient Position: Sitting)   Pulse 77   Temp 97.9 °F (36.6 °C) (Oral)   Resp 18   Ht 5' (1.524 m)   Wt 157 lb 12.8 oz (71.6 kg)   LMP 12/04/2012   SpO2 99%   BMI 30.82 kg/m²       Physical Exam  Vitals signs and nursing note reviewed. Constitutional:       General: She is not in acute distress. Appearance: She is well-developed, well-groomed and overweight. She is not diaphoretic. HENT:      Head: Normocephalic and atraumatic. Right Ear: External ear normal.      Left Ear: External ear normal.   Eyes:      General:         Right eye: No discharge. Left eye: No discharge. Conjunctiva/sclera: Conjunctivae normal.      Pupils: Pupils are equal, round, and reactive to light. Neck:      Musculoskeletal: Normal range of motion and neck supple. Cardiovascular:      Rate and Rhythm: Normal rate and regular rhythm. Heart sounds: Normal heart sounds. No murmur. No friction rub. No gallop. Pulmonary:      Effort: Pulmonary effort is normal.      Breath sounds: Normal breath sounds. No wheezing. Abdominal:      General: Bowel sounds are normal.      Palpations: Abdomen is soft. Tenderness: There is no abdominal tenderness. Musculoskeletal:      Left shoulder: She exhibits decreased range of motion (pain with extension and abduction).    Neurological:      Mental Status: She is alert and oriented to person, place, and time. Deep Tendon Reflexes: Reflexes are normal and symmetric. Psychiatric:         Behavior: Behavior normal.         Thought Content: Thought content normal.       ASSESSMENT and PLAN    ICD-10-CM ICD-9-CM    1. Chronic left shoulder pain M25.512 719.41 CBC W/O DIFF    CBC ordered. Appointment with Ortho scheduled for 03/24/2020. Advised her that an MRI may be necessary if symptoms persist to evaluate for rotator cuff injury. Discussed that pt should continue to take Dolobid 500 mg BID PRN for pain and discontinue Aleve, as this combination may cause heart burn. G89.29 338.29    2. Essential hypertension P77 996.7 METABOLIC PANEL, COMPREHENSIVE      CBC W/O DIFF    CMP and CBC ordered. BP is well-controlled on current medication. No change to dosage at this time. 3. Acquired hypothyroidism E03.9 244.9 TSH RFX ON ABNORMAL TO FREE T4    TSH and T4 ordered. Compliant on Synthroid 88 mcg and Pravastatin 40 mg daily. 4. Well controlled diabetes mellitus ([facility] [address] E11.9 250.00 HEMOGLOBIN A1C WITH EAG    Hemoglobin A1c ordered. 5. Pain in both lower extremities M79.604 729.5 Told her will check thyroid & electrolytes. M79.605     6. Vitamin D deficiency E55.9 268.9 VITAMIN D, 25 HYDROXY    Vitamin D ordered. This plan was reviewed with the patient and patient agrees. All questions were answered. This scribe documentation was reviewed by me and accurately reflects the examination and decisions made by me. This note will not be viewable in [address]

## 2020-03-16 NOTE — PROGRESS NOTES
Chief Complaint   Patient presents with    Leg Pain     f/u     1. Have you been to the ER, urgent care clinic since your last visit? Hospitalized since your last visit? No    2. Have you seen or consulted any other health care providers outside of the [address] [place] [address] since your last visit? Include any pap smears or colon screening. No     Pt is scheduled Dr. Aquiles Chavira, [facility] 3/24/20 for arm pain and leg pain.

## 2020-03-17 LAB
ALBUMIN SERPL-MCNC: 4.6 G/DL (ref 3.8–4.8)
ALBUMIN/GLOB SERPL: 2.1 {RATIO} (ref 1.2–2.2)
ALP SERPL-CCNC: 64 IU/L (ref 39–117)
ALT SERPL-CCNC: 8 IU/L (ref 0–32)
AST SERPL-CCNC: 13 IU/L (ref 0–40)
BILIRUB SERPL-MCNC: 0.5 MG/DL (ref 0–1.2)
BUN SERPL-MCNC: 17 MG/DL (ref 8–27)
BUN/CREAT SERPL: 21 (ref 12–28)
CALCIUM SERPL-MCNC: 9.3 MG/DL (ref 8.7–10.3)
CHLORIDE SERPL-SCNC: 103 MMOL/L (ref 96–106)
CO2 SERPL-SCNC: 23 MMOL/L (ref 20–29)
CREAT SERPL-MCNC: 0.82 MG/DL (ref 0.57–1)
EST. AVERAGE GLUCOSE BLD GHB EST-MCNC: 128 MG/DL
GLOBULIN SER CALC-MCNC: 2.2 G/DL (ref 1.5–4.5)
GLUCOSE SERPL-MCNC: 87 MG/DL (ref 65–99)
HBA1C MFR BLD: 6.1 % (ref 4.8–5.6)
POTASSIUM SERPL-SCNC: 4.7 MMOL/L (ref 3.5–5.2)
PROT SERPL-MCNC: 6.8 G/DL (ref 6–8.5)
SODIUM SERPL-SCNC: 138 MMOL/L (ref 134–144)
T4 FREE SERPL-MCNC: 1.46 NG/DL (ref 0.82–1.77)
TSH SERPL DL<=0.005 MIU/L-ACNC: 0.28 UIU/ML (ref 0.45–4.5)

## 2020-03-17 NOTE — PROGRESS NOTES
History and physical documented and up to date, allergies reviewed, lab results reviewed, pre-procedure education provided, patient verbalized understanding of procedure, procedural consent signed and patient is NPO. Neg mammo, one year f/up

## 2020-03-18 NOTE — PROGRESS NOTES
Ercell, please take 1/2 tablet of synthroid 3 times a week & full pill rest of the week.  Repeat levels in 3 months

## 2020-04-21 ENCOUNTER — VIRTUAL VISIT (OUTPATIENT)
Dept: PRIMARY CARE CLINIC | Age: 65
End: 2020-04-21

## 2020-04-21 DIAGNOSIS — M79.672 LEFT FOOT PAIN: ICD-10-CM

## 2020-04-21 DIAGNOSIS — E11.9 WELL CONTROLLED DIABETES MELLITUS (HCC): ICD-10-CM

## 2020-04-21 DIAGNOSIS — G89.29 CHRONIC LEFT SHOULDER PAIN: Primary | ICD-10-CM

## 2020-04-21 DIAGNOSIS — Z20.822 EXPOSURE TO COVID-19 VIRUS: ICD-10-CM

## 2020-04-21 DIAGNOSIS — M25.512 CHRONIC LEFT SHOULDER PAIN: Primary | ICD-10-CM

## 2020-04-21 NOTE — PROGRESS NOTES
Written by Royal Serum, as dictated by Dr. Rex Jeffries MD.    Tammy Seo is a 62 y.o. female. HPI   I was in the office while conducting this encounter. Consent:  She and/or her healthcare decision maker is aware that this patient-initiated Telehealth encounter is a billable service, with coverage as determined by her insurance carrier. She is aware that she may receive a bill and has provided verbal consent to proceed: Yes    This virtual visit was conducted via [employer]. Pursuant to the emergency declaration under the [facility], [facility] waiver authority and the [employer] Act, this Virtual  Visit was conducted to reduce the patient's risk of exposure to COVID-19 and provide continuity of care for an established patient. Services were provided through a video synchronous discussion virtually to substitute for in-person clinic visit. Due to this being a TeleHealth evaluation, many elements of the physical examination are unable to be assessed. The patient presents today for a follow-up. She works for [employer] and her  and a co-worker contracted [phone]. She is concerned if she should be quarantined or not because she was not in direct contact with those who were infected. She does wear a mask at work. She reports she washes her hands seven times a day. She is worried if her underlying conditions and age make her more susceptible to COVID-19. She went to the Dr. Edmond Smith (Orthopedics) and she had a Cortizone injection in her L shoulder but states she has been feeling the pain come back. She has been having pain in L thigh that radiates to her leg, foot, and second toe. She states the pain at night is 10/10 but today the pain is 4/10. She went to her podiatrist yesterday 4/20/2020. She reports the podiatrist told she has soft tissue weakening in her feet. The podiatrist told her it does not have anything to do with her diabetes because the blood flow is good. She has been using Aspercreme for her foot pain and it is working for her. Her podiatrist told her to use an orthopedic insert for a month and if it does not work she may need surgery. She reports she does have seasonal allergies and takes 800 mg of Vitamin D daily. Patient Active Problem List   Diagnosis Code    Hypothyroidism E03.9    Anemia D64.9    Hyperlipidemia E78.5    Essential hypertension I10    Well controlled diabetes mellitus ([facility] E11.9        Current Outpatient Medications on File Prior to Visit   Medication Sig Dispense Refill    levothyroxine (SYNTHROID) 88 mcg tablet TAKE 1 TABLET BY MOUTH ONCE DAILY BEFORE BREAKFAST FOR 90 DAYS (Patient not taking: Reported on 3/16/2020) 90 Tab 1    VITAMIN D2 1,250 mcg (50,000 unit) capsule TAKE 1 CAPSULE BY MOUTH ONCE A WEEK 4 Cap 0    pravastatin (PRAVACHOL) 40 mg tablet TAKE 1 TABLET BY MOUTH ONCE DAILY 90 Tab 0    lisinopril (PRINIVIL, ZESTRIL) 10 mg tablet TAKE ONE TABLET BY MOUTH ONE TIME DAILY 90 Tab 0    metFORMIN (GLUCOPHAGE) 500 mg tablet TAKE ONE TABLET BY MOUTH TWICE A DAY WITH MEAL(S) 180 Tab 0    diflunisaL (DOLOBID) 500 mg tab TAKE 1 TABLET BY MOUTH TWICE DAILY FOR 10 DAYS 20 Tab 0    levothyroxine (SYNTHROID) 88 mcg tablet TAKE ONE TABLET BY MOUTH ONCE DAILY BEFORE BREAKFAST Mon Wed Sat 1/2 tablet Tues and Thurs. 90 Tab 0    VITAMIN D3 1,000 unit tablet TAKE ONE TABLET BY MOUTH ONE TIME DAILY (Patient not taking: Reported on 3/16/2020) 30 Tab 2    Blood-Glucose Meter monitoring kit Please use four times a day and as needed.  Dx. E11.9 1 Kit 0    glucose blood VI test strips (BLOOD GLUCOSE TEST) strip Use to test blood sugars 3 times a day and as needed DX. E11.9 100 Strip 6    Lancets misc Use to check blood sugars 3 times daily and as needed 200 Each 11    aspirin delayed-release (ASPIRIN EC) 81 mg tablet Take 81 mg by mouth daily. No current facility-administered medications on file prior to visit.         No Known Allergies    Past Medical History:   Diagnosis Date    Anemia     High cholesterol     Thyroid disease     tyroid nodule removed        Past Surgical History:   Procedure Laterality Date    HX HEENT      THYROIDECTOMY         Family History   Problem Relation Age of Onset    Diabetes Mother     Hypertension Mother     High Cholesterol Mother     Diabetes Sister     Hypertension Sister     Stroke Sister     Diabetes Brother     Hypertension Brother     Diabetes Maternal Grandmother     Heart Disease Maternal Grandmother        Social History     Socioeconomic History    Marital status: LEGALLY      Spouse name: Not on file    Number of children: Not on file    Years of education: Not on file    Highest education level: Not on file   Occupational History    Not on file   Social Needs    Financial resource strain: Not on file    Food insecurity     Worry: Not on file     Inability: Not on file    Transportation needs     Medical: Not on file     Non-medical: Not on file   Tobacco Use    Smoking status: Former Smoker     Packs/day: 0.50     Years: 30.00     Pack years: 15.00     Types: Cigarettes     Last attempt to quit: 3/1/2019     Years since quittin.1    Smokeless tobacco: Never Used   Substance and Sexual Activity    Alcohol use: No    Drug use: No    Sexual activity: Yes   Lifestyle    Physical activity     Days per week: Not on file     Minutes per session: Not on file    Stress: Not on file   Relationships    Social connections     Talks on phone: Not on file     Gets together: Not on file     Attends Rastafarian service: Not on file     Active member of club or organization: Not on file     Attends meetings of clubs or organizations: Not on file     Relationship status: Not on file    Intimate partner violence     Fear of current or ex partner: Not on file Emotionally abused: Not on file     Physically abused: Not on file     Forced sexual activity: Not on file   Other Topics Concern    Not on file   Social History Narrative    Not on file       Office Visit on 03/16/2020   Component Date Value Ref Range Status    TSH 03/16/2020 0.276* 0.450 - 4.500 uIU/mL Final    Hemoglobin A1c 03/16/2020 6.1* 4.8 - 5.6 % Final    Comment:          Prediabetes: 5.7 - 6.4           Diabetes: >6.4           Glycemic control for adults with diabetes: <7.0      Estimated average glucose 03/16/2020 128  mg/dL Final    Glucose 03/16/2020 87  65 - 99 mg/dL Final    BUN 03/16/2020 17  8 - 27 mg/dL Final    Creatinine 03/16/2020 0.82  0.57 - 1.00 mg/dL Final    GFR est non-AA 03/16/2020 76  >59 mL/min/1.73 Final    GFR est AA 03/16/2020 88  >59 mL/min/1.73 Final    BUN/Creatinine ratio 03/16/2020 21  12 - 28 Final    Sodium 03/16/2020 138  134 - 144 mmol/L Final    Potassium 03/16/2020 4.7  3.5 - 5.2 mmol/L Final    Chloride 03/16/2020 103  96 - 106 mmol/L Final    CO2 03/16/2020 23  20 - 29 mmol/L Final    Calcium 03/16/2020 9.3  8.7 - 10.3 mg/dL Final    Protein, total 03/16/2020 6.8  6.0 - 8.5 g/dL Final    Albumin 03/16/2020 4.6  3.8 - 4.8 g/dL Final    GLOBULIN, TOTAL 03/16/2020 2.2  1.5 - 4.5 g/dL Final    A-G Ratio 03/16/2020 2.1  1.2 - 2.2 Final    Bilirubin, total 03/16/2020 0.5  0.0 - 1.2 mg/dL Final    Alk. phosphatase 03/16/2020 64  39 - 117 IU/L Final    AST (SGOT) 03/16/2020 13  0 - 40 IU/L Final    ALT (SGPT) 03/16/2020 8  0 - 32 IU/L Final    T4,Free (Direct)^ 03/16/2020 1.46  0.82 - 1.77 ng/dL Final     Review of Systems   Constitutional: Negative for malaise/fatigue. HENT: Negative for congestion. Eyes: Negative for blurred vision. Respiratory: Negative for shortness of breath. Cardiovascular: Negative for chest pain. Gastrointestinal: Negative for heartburn. Musculoskeletal: Positive for myalgias (L shoulder pain, L foor pain). Negative for joint pain. Neurological: Negative for dizziness. Psychiatric/Behavioral: Negative for depression. The patient does not have insomnia. Visit Vitals  LMP 12/04/2012     Physical Exam  Constitutional:       General: She is not in acute distress. Appearance: She is well-developed. She is not diaphoretic. HENT:      Head: Normocephalic and atraumatic. Nose: No congestion. Eyes:      General:         Right eye: No discharge. Left eye: No discharge. Conjunctiva/sclera: Conjunctivae normal.   Pulmonary:      Effort: Pulmonary effort is normal. No respiratory distress. Neurological:      Mental Status: She is alert and oriented to person, place, and time. Psychiatric:         Behavior: Behavior normal.         Thought Content: Thought content normal.         ASSESSMENT and PLAN    ICD-10-CM ICD-9-CM    1. Chronic left shoulder pain M25.512 719.41 Pt is followed by Orthopedics, she had a Cortizone injection     G89.29 338.29    2. Exposure to Covid-19 Virus Z20.828  Explained she is likely fine and should keep taking the proper precautions. Explained that she does have underlying health conditions but well under controlled. However, she should be careful. 3. Left foot pain M79.672 729.5 Followed by podiatry    4. Well controlled diabetes mellitus ([facility] E11.9 250.00 Controlled at this time. She should take extra precautions because she is more susceptible to COVID-19 due to Diabetes. Total Time: minutes: 11-20 minutes. This plan was reviewed with the patient and patient agrees. All questions were answered. This scribe documentation was reviewed by me and accurately reflects the examination and decisions made by me. This note will not be viewable in [address]

## 2020-04-30 DIAGNOSIS — I10 HYPERTENSION, UNSPECIFIED TYPE: ICD-10-CM

## 2020-04-30 DIAGNOSIS — E11.21 TYPE 2 DIABETES WITH NEPHROPATHY (HCC): ICD-10-CM

## 2020-04-30 RX ORDER — METFORMIN HYDROCHLORIDE 500 MG/1
TABLET ORAL
Qty: 180 TAB | Refills: 0 | Status: SHIPPED | OUTPATIENT
Start: 2020-04-30 | End: 2020-11-04

## 2020-04-30 RX ORDER — LISINOPRIL 10 MG/1
TABLET ORAL
Qty: 90 TAB | Refills: 0 | Status: SHIPPED | OUTPATIENT
Start: 2020-04-30 | End: 2020-07-22

## 2020-05-30 DIAGNOSIS — E78.00 HYPERCHOLESTEROLEMIA: ICD-10-CM

## 2020-05-30 RX ORDER — PRAVASTATIN SODIUM 40 MG/1
TABLET ORAL
Qty: 90 TAB | Refills: 0 | Status: CANCELLED | OUTPATIENT
Start: 2020-05-30

## 2020-05-31 DIAGNOSIS — E78.00 HYPERCHOLESTEROLEMIA: ICD-10-CM

## 2020-05-31 RX ORDER — PRAVASTATIN SODIUM 40 MG/1
TABLET ORAL
Qty: 90 TAB | Refills: 0 | Status: SHIPPED | OUTPATIENT
Start: 2020-05-31 | End: 2020-09-04

## 2020-07-07 ENCOUNTER — OFFICE VISIT (OUTPATIENT)
Dept: PRIMARY CARE CLINIC | Age: 65
End: 2020-07-07

## 2020-07-07 VITALS
OXYGEN SATURATION: 99 % | HEIGHT: 60 IN | TEMPERATURE: 98 F | DIASTOLIC BLOOD PRESSURE: 84 MMHG | RESPIRATION RATE: 16 BRPM | BODY MASS INDEX: 30.67 KG/M2 | HEART RATE: 96 BPM | WEIGHT: 156.2 LBS | SYSTOLIC BLOOD PRESSURE: 129 MMHG

## 2020-07-07 DIAGNOSIS — R13.19 OTHER DYSPHAGIA: ICD-10-CM

## 2020-07-07 DIAGNOSIS — E03.9 ACQUIRED HYPOTHYROIDISM: ICD-10-CM

## 2020-07-07 DIAGNOSIS — E11.9 WELL CONTROLLED DIABETES MELLITUS (HCC): Primary | ICD-10-CM

## 2020-07-07 DIAGNOSIS — E78.2 MIXED HYPERLIPIDEMIA: ICD-10-CM

## 2020-07-07 DIAGNOSIS — M54.50 CHRONIC MIDLINE LOW BACK PAIN WITHOUT SCIATICA: ICD-10-CM

## 2020-07-07 DIAGNOSIS — Z23 NEED FOR VACCINATION AGAINST STREPTOCOCCUS PNEUMONIAE USING PNEUMOCOCCAL CONJUGATE VACCINE 13: ICD-10-CM

## 2020-07-07 DIAGNOSIS — Z23 NEED FOR DIPHTHERIA-TETANUS-PERTUSSIS (TDAP) VACCINE: ICD-10-CM

## 2020-07-07 DIAGNOSIS — G89.29 CHRONIC MIDLINE LOW BACK PAIN WITHOUT SCIATICA: ICD-10-CM

## 2020-07-07 RX ORDER — DICLOFENAC SODIUM 10 MG/G
2 GEL TOPICAL 4 TIMES DAILY
Qty: 100 G | Refills: 0 | Status: SHIPPED | OUTPATIENT
Start: 2020-07-07 | End: 2020-08-06

## 2020-07-07 NOTE — PROGRESS NOTES
Chief Complaint   Patient presents with    Follow Up Chronic Condition     diabetes    Other     feels like she has something stuck in throat when she eats and the ears will hurt

## 2020-07-07 NOTE — PROGRESS NOTES
Written by Yon Esposito, as dictated by Dr. Maegan Pike MD.    Nicanor Acosta is a 62 y.o. female. HPI  Pt presents today for routine care follow-up. She checks her BG regularly and it is 110 on average. She forgets to take metformin occasionally and notices higher BG then, but nothing out of control. She is compliant on synthroid 88 mcg and vitamin D supplements as directed and is feeling well. She feels as though food gets stuck in her throat when she is swallowing it. She has coughed up some sputum as well. She takes lisinopril 10 mg, pravastatin 40 mg qPM, and aspirin 81 mg every day. She has not had any shoulder pain recently. She is experiencing lower back pain accompanied by bloating, especially after meals. She uses the bathroom when she first gets up in the morning, but due to her work schedule, she may not be able to use the bathroom again until the afternoon and she is also standing all day. She is concerned that this is causing/exacerbating the pain. She has not felt much relief from the diclofenac she has been taking, but she has had a cortisone injection with good relief. She has been working at [employer], and multiple people she works with have had Mis [employer]. She was tested on 6/1/20 and was negative. She has some discomfort in her L ear and has been putting sweet oil in it with no relief.      Patient Active Problem List   Diagnosis Code    Hypothyroidism E03.9    Anemia D64.9    Hyperlipidemia E78.5    Essential hypertension I10    Well controlled diabetes mellitus ([facility] [address] E11.9        Current Outpatient Medications on File Prior to Visit   Medication Sig Dispense Refill    pravastatin (PRAVACHOL) 40 mg tablet Take 1 tablet by mouth once daily 90 Tab 0    metFORMIN (GLUCOPHAGE) 500 mg tablet TAKE ONE TABLET BY MOUTH TWICE A DAY WITH MEAL(S) 180 Tab 0    lisinopriL (PRINIVIL, ZESTRIL) 10 mg tablet TAKE ONE TABLET BY MOUTH ONE TIME DAILY 90 Tab 0    levothyroxine (SYNTHROID) 88 mcg tablet TAKE 1 TABLET BY MOUTH ONCE DAILY BEFORE BREAKFAST FOR 90 DAYS (Patient not taking: Reported on 3/16/2020) 90 Tab 1    VITAMIN D2 1,250 mcg (50,000 unit) capsule TAKE 1 CAPSULE BY MOUTH ONCE A WEEK 4 Cap 0    diflunisaL (DOLOBID) 500 mg tab TAKE 1 TABLET BY MOUTH TWICE DAILY FOR 10 DAYS 20 Tab 0    levothyroxine (SYNTHROID) 88 mcg tablet TAKE ONE TABLET BY MOUTH ONCE DAILY BEFORE BREAKFAST Mon Wed Sat 1/2 tablet Tues and Thurs. 90 Tab 0    VITAMIN D3 1,000 unit tablet TAKE ONE TABLET BY MOUTH ONE TIME DAILY (Patient not taking: Reported on 3/16/2020) 30 Tab 2    Blood-Glucose Meter monitoring kit Please use four times a day and as needed. Dx. E11.9 1 Kit 0    glucose blood VI test strips (BLOOD GLUCOSE TEST) strip Use to test blood sugars 3 times a day and as needed DX. E11.9 100 Strip 6    Lancets misc Use to check blood sugars 3 times daily and as needed 200 Each 11    aspirin delayed-release (ASPIRIN EC) 81 mg tablet Take 81 mg by mouth daily. No current facility-administered medications on file prior to visit.         No Known Allergies    Past Medical History:   Diagnosis Date    Anemia     High cholesterol     Thyroid disease     tyroid nodule removed 1998       Past Surgical History:   Procedure Laterality Date    HX HEENT      THYROIDECTOMY         Family History   Problem Relation Age of Onset    Diabetes Mother     Hypertension Mother     High Cholesterol Mother     Diabetes Sister     Hypertension Sister     Stroke Sister     Diabetes Brother     Hypertension Brother     Diabetes Maternal Grandmother     Heart Disease Maternal Grandmother        Social History     Socioeconomic History    Marital status: LEGALLY      Spouse name: Not on file    Number of children: Not on file    Years of education: Not on file    Highest education level: Not on file   Occupational History    Not on file   Social Needs    Financial resource strain: Not on file    Food insecurity     Worry: Not on file     Inability: Not on file    Transportation needs     Medical: Not on file     Non-medical: Not on file   Tobacco Use    Smoking status: Former Smoker     Packs/day: 0.50     Years: 30.00     Pack years: 15.00     Types: Cigarettes     Last attempt to quit: 3/1/2019     Years since quittin.3    Smokeless tobacco: Never Used   Substance and Sexual Activity    Alcohol use: No    Drug use: No    Sexual activity: Yes   Lifestyle    Physical activity     Days per week: Not on file     Minutes per session: Not on file    Stress: Not on file   Relationships    Social connections     Talks on phone: Not on file     Gets together: Not on file     Attends Yarsani service: Not on file     Active member of club or organization: Not on file     Attends meetings of clubs or organizations: Not on file     Relationship status: Not on file    Intimate partner violence     Fear of current or ex partner: Not on file     Emotionally abused: Not on file     Physically abused: Not on file     Forced sexual activity: Not on file   Other Topics Concern    Not on file   Social History Narrative    Not on file       No visits with results within 3 Month(s) from this visit.    Latest known visit with results is:   Office Visit on 2020   Component Date Value Ref Range Status    TSH 2020 0.276* 0.450 - 4.500 uIU/mL Final    Hemoglobin A1c 2020 6.1* 4.8 - 5.6 % Final    Comment:          Prediabetes: 5.7 - 6.4           Diabetes: >6.4           Glycemic control for adults with diabetes: <7.0      Estimated average glucose 2020 128  mg/dL Final    Glucose 2020 87  65 - 99 mg/dL Final    BUN 2020 17  8 - 27 mg/dL Final    Creatinine 2020 0.82  0.57 - 1.00 mg/dL Final    GFR est non-AA 2020 76  >59 mL/min/1.73 Final    GFR est AA 2020 88  >59 mL/min/1.73 Final    BUN/Creatinine ratio 03/16/2020 21  12 - 28 Final    Sodium 03/16/2020 138  134 - 144 mmol/L Final    Potassium 03/16/2020 4.7  3.5 - 5.2 mmol/L Final    Chloride 03/16/2020 103  96 - 106 mmol/L Final    CO2 03/16/2020 23  20 - 29 mmol/L Final    Calcium 03/16/2020 9.3  8.7 - 10.3 mg/dL Final    Protein, total 03/16/2020 6.8  6.0 - 8.5 g/dL Final    Albumin 03/16/2020 4.6  3.8 - 4.8 g/dL Final    GLOBULIN, TOTAL 03/16/2020 2.2  1.5 - 4.5 g/dL Final    A-G Ratio 03/16/2020 2.1  1.2 - 2.2 Final    Bilirubin, total 03/16/2020 0.5  0.0 - 1.2 mg/dL Final    Alk. phosphatase 03/16/2020 64  39 - 117 IU/L Final    AST (SGOT) 03/16/2020 13  0 - 40 IU/L Final    ALT (SGPT) 03/16/2020 8  0 - 32 IU/L Final    T4,Free (Direct)^ 03/16/2020 1.46  0.82 - 1.77 ng/dL Final       Review of Systems   Constitutional: Negative for malaise/fatigue and weight loss. HENT: Negative for congestion and sore throat. +feeling of food getting caught when swallowing   Eyes: Negative for blurred vision. Respiratory: Positive for sputum production (occasional). Negative for cough and shortness of breath. Cardiovascular: Negative for chest pain and leg swelling. Gastrointestinal: Negative for constipation and heartburn. Genitourinary: Negative for frequency and urgency. Musculoskeletal: Negative for back pain, joint pain and myalgias. Neurological: Negative for dizziness and headaches. Psychiatric/Behavioral: Negative for depression. The patient is not nervous/anxious and does not have insomnia. Visit Vitals  /84 (BP 1 Location: Left arm, BP Patient Position: Sitting)   Pulse 96   Temp 98 °F (36.7 °C) (Temporal)   Resp 16   Ht 5' (1.524 m)   Wt 156 lb 3.2 oz (70.9 kg)   LMP 12/04/2012   SpO2 99%   BMI 30.51 kg/m²       Physical Exam  Vitals signs and nursing note reviewed. Constitutional:       General: She is not in acute distress. Appearance: Normal appearance. She is well-developed and well-groomed.  She is not diaphoretic. HENT:      Right Ear: External ear normal.      Left Ear: External ear normal. There is impacted cerumen. Eyes:      General: No scleral icterus. Right eye: No discharge. Left eye: No discharge. Extraocular Movements: Extraocular movements intact. Neck:      Musculoskeletal: Normal range of motion and neck supple. Cardiovascular:      Rate and Rhythm: Normal rate and regular rhythm. Pulmonary:      Effort: Pulmonary effort is normal.      Breath sounds: Normal breath sounds. No wheezing. Musculoskeletal:      Right lower leg: No edema. Left lower leg: No edema. Lymphadenopathy:      Cervical: No cervical adenopathy. Neurological:      Mental Status: She is alert and oriented to person, place, and time. Psychiatric:         Mood and Affect: Mood and affect normal.         Behavior: Behavior normal.       ASSESSMENT and PLAN    ICD-10-CM ICD-9-CM    1. Well controlled diabetes mellitus ([facility] [address] E11.9 250.00 HEMOGLOBIN A1C WITH EAG      LIPID PANEL      METABOLIC PANEL, COMPREHENSIVE     Labs drawn in office today. 2. Acquired hypothyroidism E03.9 244.9 TSH 3RD GENERATION      CBC W/O DIFF    Labs drawn in office today. 3. Other dysphagia R13.19 787.29 REFERRAL TO GASTROENTEROLOGY    I provided a referral for her to complete an upper endoscopy to check on her throat. Written referral given. 4. Mixed hyperlipidemia E78.2 272.2 Continue Pravachol. 5. Need for diphtheria-tetanus-pertussis (Tdap) vaccine Z23 V06.1 diph,Pertuss,Acell,,Tet Vac-PF (ADACEL) 2 Lf-(2.5-5-3-5 mcg)-5Lf/0.5 mL susp sent to pharmacy. Potential side effects were discussed. Prescribed the Tdap vaccine. 6. Need for vaccination against Streptococcus pneumoniae using pneumococcal conjugate vaccine 13 Z23 V03.82 pneumococcal 13 felisa conj dip (PREVNAR-13) 0.5 mL syrg injection sent to pharmacy. Potential side effects were discussed.     We discussed the different pneumonia vaccines and the ages at which she should get them. I prescribed the Prevnar vaccine. 7. Chronic midline low back pain without sciatica M54.5 724.2 diclofenac (VOLTAREN) 1 % gel sent to pharmacy. Potential side effects were discussed. I prescribed diclofenac 1% gel for pain relief in her back. G89.29 338.29     Additional comments: We discussed that above the age of 54 she can consider the Shingrix vaccine. However, I advised her to wait until later for this vaccine in order to have the immunity stronger later in her life when she will need it most. She should also see how much this vaccine would cost.   She has impacted cerumen in her L ear. I recommended that she use debrox to clear it out. This plan was reviewed with the patient and patient agrees. All questions were answered. This scribe documentation was reviewed by me and accurately reflects the examination and decisions made by me. This note will not be viewable in [address]

## 2020-07-08 ENCOUNTER — TELEPHONE (OUTPATIENT)
Dept: PRIMARY CARE CLINIC | Age: 65
End: 2020-07-08

## 2020-07-08 LAB
ALBUMIN SERPL-MCNC: 4.8 G/DL (ref 3.8–4.8)
ALBUMIN/GLOB SERPL: 2.1 {RATIO} (ref 1.2–2.2)
ALP SERPL-CCNC: 71 IU/L (ref 39–117)
ALT SERPL-CCNC: 12 IU/L (ref 0–32)
AST SERPL-CCNC: 14 IU/L (ref 0–40)
BILIRUB SERPL-MCNC: 0.5 MG/DL (ref 0–1.2)
BUN SERPL-MCNC: 16 MG/DL (ref 8–27)
BUN/CREAT SERPL: 20 (ref 12–28)
CALCIUM SERPL-MCNC: 9.9 MG/DL (ref 8.7–10.3)
CHLORIDE SERPL-SCNC: 101 MMOL/L (ref 96–106)
CHOLEST SERPL-MCNC: 164 MG/DL (ref 100–199)
CO2 SERPL-SCNC: 21 MMOL/L (ref 20–29)
CREAT SERPL-MCNC: 0.81 MG/DL (ref 0.57–1)
ERYTHROCYTE [DISTWIDTH] IN BLOOD BY AUTOMATED COUNT: 12.8 % (ref 11.7–15.4)
EST. AVERAGE GLUCOSE BLD GHB EST-MCNC: 128 MG/DL
GLOBULIN SER CALC-MCNC: 2.3 G/DL (ref 1.5–4.5)
GLUCOSE SERPL-MCNC: 86 MG/DL (ref 65–99)
HBA1C MFR BLD: 6.1 % (ref 4.8–5.6)
HCT VFR BLD AUTO: 37.6 % (ref 34–46.6)
HDLC SERPL-MCNC: 67 MG/DL
HGB BLD-MCNC: 12.7 G/DL (ref 11.1–15.9)
LDLC SERPL CALC-MCNC: 80 MG/DL (ref 0–99)
MCH RBC QN AUTO: 30.3 PG (ref 26.6–33)
MCHC RBC AUTO-ENTMCNC: 33.8 G/DL (ref 31.5–35.7)
MCV RBC AUTO: 90 FL (ref 79–97)
PLATELET # BLD AUTO: 251 X10E3/UL (ref 150–450)
POTASSIUM SERPL-SCNC: 4.6 MMOL/L (ref 3.5–5.2)
PROT SERPL-MCNC: 7.1 G/DL (ref 6–8.5)
RBC # BLD AUTO: 4.19 X10E6/UL (ref 3.77–5.28)
SODIUM SERPL-SCNC: 137 MMOL/L (ref 134–144)
TRIGL SERPL-MCNC: 87 MG/DL (ref 0–149)
TSH SERPL DL<=0.005 MIU/L-ACNC: 0.66 UIU/ML (ref 0.45–4.5)
VLDLC SERPL CALC-MCNC: 17 MG/DL (ref 5–40)
WBC # BLD AUTO: 4.3 X10E3/UL (ref 3.4–10.8)

## 2020-07-08 NOTE — TELEPHONE ENCOUNTER
Patient needs to talk to a nurse. Stated she needs to everything that was in her chart from her visit on the 7th. Stated it was in her chart and now its gone and she cant see it anymore.

## 2020-07-09 DIAGNOSIS — E11.9 WELL CONTROLLED DIABETES MELLITUS (HCC): Primary | ICD-10-CM

## 2020-07-09 RX ORDER — METFORMIN HYDROCHLORIDE 500 MG/1
500 TABLET, EXTENDED RELEASE ORAL
Qty: 90 TAB | Refills: 0 | Status: SHIPPED | OUTPATIENT
Start: 2020-07-09 | End: 2020-09-03

## 2020-07-09 NOTE — TELEPHONE ENCOUNTER
Confirmed patient id and advised that labs have come in but Dr Rosaura Ching is reviewing and we will contact later.   Patient is working after 12 and is ok with message left on answering machine

## 2020-07-09 NOTE — TELEPHONE ENCOUNTER
Patient would like for a nurse to try and call back before 12 pm today. She's busy after 12. She has questions on her A1C, etc. She was having a hard time understanding everything on MyChart.

## 2020-07-09 NOTE — PROGRESS NOTES
Nicolette Lombard, it was nice seeing you in the office. I left you a voicemail as well. Your blood report looks good. TSH( Thyroid number) is in normal range. Please continue same dose synthroid. Let me know if you need refill. Diabetes number are same. I will send a Metformin to the pharmacy. Please ask the pharmacist if it`s from the lot which is not contaminated.

## 2020-07-22 DIAGNOSIS — I10 HYPERTENSION, UNSPECIFIED TYPE: ICD-10-CM

## 2020-07-22 RX ORDER — LISINOPRIL 10 MG/1
TABLET ORAL
Qty: 90 TAB | Refills: 0 | Status: SHIPPED | OUTPATIENT
Start: 2020-07-22 | End: 2020-09-03

## 2020-08-23 DIAGNOSIS — E03.9 ACQUIRED HYPOTHYROIDISM: ICD-10-CM

## 2020-08-23 RX ORDER — LEVOTHYROXINE SODIUM 88 UG/1
TABLET ORAL
Qty: 90 TAB | Refills: 1 | Status: SHIPPED | OUTPATIENT
Start: 2020-08-23 | End: 2021-01-28

## 2020-09-03 DIAGNOSIS — E11.9 WELL CONTROLLED DIABETES MELLITUS (HCC): ICD-10-CM

## 2020-09-03 DIAGNOSIS — I10 HYPERTENSION, UNSPECIFIED TYPE: ICD-10-CM

## 2020-09-03 RX ORDER — LISINOPRIL 10 MG/1
TABLET ORAL
Qty: 90 TAB | Refills: 0 | Status: SHIPPED | OUTPATIENT
Start: 2020-09-03 | End: 2020-11-30

## 2020-09-03 RX ORDER — METFORMIN HYDROCHLORIDE 500 MG/1
TABLET, EXTENDED RELEASE ORAL
Qty: 90 TAB | Refills: 0 | Status: SHIPPED | OUTPATIENT
Start: 2020-09-03 | End: 2020-12-16

## 2020-09-04 DIAGNOSIS — E78.00 HYPERCHOLESTEROLEMIA: ICD-10-CM

## 2020-09-04 RX ORDER — PRAVASTATIN SODIUM 40 MG/1
TABLET ORAL
Qty: 90 TAB | Refills: 0 | Status: SHIPPED | OUTPATIENT
Start: 2020-09-04 | End: 2020-11-29

## 2020-09-25 ENCOUNTER — TELEPHONE (OUTPATIENT)
Dept: PRIMARY CARE CLINIC | Age: 65
End: 2020-09-25

## 2020-09-25 NOTE — TELEPHONE ENCOUNTER
Patient would like to know when she needs to schedule a follow up appointment from her last visit. Leave voicemail with information since patient is at work.

## 2020-10-06 ENCOUNTER — TELEPHONE (OUTPATIENT)
Dept: PRIMARY CARE CLINIC | Age: 65
End: 2020-10-06

## 2020-10-06 NOTE — TELEPHONE ENCOUNTER
Pharmacy called pt and stated the the Metformin  mg had a recall. Pt needs alternative medication and would like a call back at earliest convenience today.

## 2020-10-06 NOTE — TELEPHONE ENCOUNTER
Please disregard message; patient called back and said the pharmacy called her and stated that the medication they have was not affected by the recall

## 2020-11-04 ENCOUNTER — OFFICE VISIT (OUTPATIENT)
Dept: PRIMARY CARE CLINIC | Age: 65
End: 2020-11-04
Payer: COMMERCIAL

## 2020-11-04 VITALS
BODY MASS INDEX: 31.8 KG/M2 | WEIGHT: 162 LBS | TEMPERATURE: 97.4 F | OXYGEN SATURATION: 100 % | DIASTOLIC BLOOD PRESSURE: 84 MMHG | RESPIRATION RATE: 16 BRPM | HEART RATE: 92 BPM | SYSTOLIC BLOOD PRESSURE: 135 MMHG | HEIGHT: 60 IN

## 2020-11-04 DIAGNOSIS — I10 HYPERTENSION, UNSPECIFIED TYPE: ICD-10-CM

## 2020-11-04 DIAGNOSIS — D17.21 LIPOMA OF RIGHT AXILLA: ICD-10-CM

## 2020-11-04 DIAGNOSIS — M54.50 CHRONIC MIDLINE LOW BACK PAIN WITHOUT SCIATICA: ICD-10-CM

## 2020-11-04 DIAGNOSIS — G89.29 CHRONIC MIDLINE LOW BACK PAIN WITHOUT SCIATICA: ICD-10-CM

## 2020-11-04 DIAGNOSIS — H93.8X3 SENSATION OF FULLNESS IN BOTH EARS: ICD-10-CM

## 2020-11-04 DIAGNOSIS — E11.9 WELL CONTROLLED DIABETES MELLITUS (HCC): ICD-10-CM
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
## 2020-11-04 PROCEDURE — 99213 OFFICE O/P EST LOW 20 MIN: CPT | Performed by: INTERNAL MEDICINE

## 2020-11-04 RX ORDER — DICLOFENAC SODIUM 10 MG/G
4 GEL TOPICAL 4 TIMES DAILY
Qty: 100 G | Refills: 0 | Status: SHIPPED | OUTPATIENT
Start: 2020-11-04 | End: 2020-12-04

## 2020-11-04 NOTE — PROGRESS NOTES
Chief Complaint   Patient presents with    Skin Problem     on left leg on the outside of the calf she has had a bite from six months and it still causes pain and itching.   found a place under the right arm.  states that she might have thrush    Ear Pain     in the left ear states that her bp seems to be elevated

## 2020-11-04 NOTE — PROGRESS NOTES
Written by Perla Gautam, as dictated by Dr. Joe Lerma MD.    Rajesh Steiner is a 60 y.o. female. HPI  Pt presents today to discuss mosquito bites from 6 months ago which are causing itching pain. She is having pain and tenderness in her axillae, and there is a bump in the R axilla. She inquires if this is concerning. She had some white patches on her tongue, and she asked her pharmacist what it was. They let her know it might be thrush, and she used some OTC canker sore medicine which cleared it up. She has also been feeling some fullness in her B/L ears. She is taking lisinopril every day, and her BP looks good in office today at 135/84. She also takes pravastatin every day. She takes metformin with dinner every night and levothyroxine qAM before breakfast.    Patient Active Problem List   Diagnosis Code    Hypothyroidism E03.9    Anemia D64.9    Hyperlipidemia E78.5    Essential hypertension I10    Well controlled diabetes mellitus ([facility] [address] E11.9        Current Outpatient Medications on File Prior to Visit   Medication Sig Dispense Refill    pravastatin (PRAVACHOL) 40 mg tablet Take 1 tablet by mouth once daily 90 Tab 0    lisinopriL (PRINIVIL, ZESTRIL) 10 mg tablet TAKE ONE TABLET BY MOUTH ONE TIME DAILY 90 Tab 0    metFORMIN ER (GLUCOPHAGE XR) 500 mg tablet TAKE ONE TABLET BY MOUTH ONE TIME DAILY WITH DINNER 90 Tab 0    levothyroxine (SYNTHROID) 88 mcg tablet TAKE ONE TABLET BY MOUTH EVERY MORNING BEFORE BREAKFAST 90 Tab 1    VITAMIN D3 1,000 unit tablet TAKE ONE TABLET BY MOUTH ONE TIME DAILY 30 Tab 2    Blood-Glucose Meter monitoring kit Please use four times a day and as needed.  Dx. E11.9 1 Kit 0    glucose blood VI test strips (BLOOD GLUCOSE TEST) strip Use to test blood sugars 3 times a day and as needed DX. E11.9 100 Strip 6    Lancets misc Use to check blood sugars 3 times daily and as needed 200 Each 11    aspirin delayed-release (ASPIRIN EC) 81 mg tablet Take 81 mg by mouth daily. No current facility-administered medications on file prior to visit.         No Known Allergies    Past Medical History:   Diagnosis Date    Anemia     High cholesterol     Thyroid disease     tyroid nodule removed        Past Surgical History:   Procedure Laterality Date    HX HEENT      THYROIDECTOMY         Family History   Problem Relation Age of Onset    Diabetes Mother     Hypertension Mother     High Cholesterol Mother     Diabetes Sister     Hypertension Sister     Stroke Sister     Diabetes Brother     Hypertension Brother     Diabetes Maternal Grandmother     Heart Disease Maternal Grandmother        Social History     Socioeconomic History    Marital status: LEGALLY      Spouse name: Not on file    Number of children: Not on file    Years of education: Not on file    Highest education level: Not on file   Occupational History    Not on file   Social Needs    Financial resource strain: Not on file    Food insecurity     Worry: Not on file     Inability: Not on file    Transportation needs     Medical: Not on file     Non-medical: Not on file   Tobacco Use    Smoking status: Former Smoker     Packs/day: 0.50     Years: 30.00     Pack years: 15.00     Types: Cigarettes     Last attempt to quit: 3/1/2019     Years since quittin.6    Smokeless tobacco: Never Used   Substance and Sexual Activity    Alcohol use: No    Drug use: No    Sexual activity: Yes   Lifestyle    Physical activity     Days per week: Not on file     Minutes per session: Not on file    Stress: Not on file   Relationships    Social connections     Talks on phone: Not on file     Gets together: Not on file     Attends Mandaen service: Not on file     Active member of club or organization: Not on file     Attends meetings of clubs or organizations: Not on file     Relationship status: Not on file    Intimate partner violence     Fear of current or ex partner: Not on file     Emotionally abused: Not on file     Physically abused: Not on file     Forced sexual activity: Not on file   Other Topics Concern    Not on file   Social History Narrative    Not on file       No visits with results within 3 Month(s) from this visit. Latest known visit with results is:   Office Visit on 07/07/2020   Component Date Value Ref Range Status    Hemoglobin A1c 07/07/2020 6.1* 4.8 - 5.6 % Final    Comment:          Prediabetes: 5.7 - 6.4           Diabetes: >6.4           Glycemic control for adults with diabetes: <7.0      Estimated average glucose 07/07/2020 128  mg/dL Final    Cholesterol, total 07/07/2020 164  100 - 199 mg/dL Final    Triglyceride 07/07/2020 87  0 - 149 mg/dL Final    HDL Cholesterol 07/07/2020 67  >39 mg/dL Final    VLDL, calculated 07/07/2020 17  5 - 40 mg/dL Final    LDL, calculated 07/07/2020 80  0 - 99 mg/dL Final    Glucose 07/07/2020 86  65 - 99 mg/dL Final    BUN 07/07/2020 16  8 - 27 mg/dL Final    Creatinine 07/07/2020 0.81  0.57 - 1.00 mg/dL Final    GFR est non-AA 07/07/2020 78  >59 mL/min/1.73 Final    GFR est AA 07/07/2020 89  >59 mL/min/1.73 Final    BUN/Creatinine ratio 07/07/2020 20  12 - 28 Final    Sodium 07/07/2020 137  134 - 144 mmol/L Final    Potassium 07/07/2020 4.6  3.5 - 5.2 mmol/L Final    Chloride 07/07/2020 101  96 - 106 mmol/L Final    CO2 07/07/2020 21  20 - 29 mmol/L Final    Calcium 07/07/2020 9.9  8.7 - 10.3 mg/dL Final    Protein, total 07/07/2020 7.1  6.0 - 8.5 g/dL Final    Albumin 07/07/2020 4.8  3.8 - 4.8 g/dL Final    GLOBULIN, TOTAL 07/07/2020 2.3  1.5 - 4.5 g/dL Final    A-G Ratio 07/07/2020 2.1  1.2 - 2.2 Final    Bilirubin, total 07/07/2020 0.5  0.0 - 1.2 mg/dL Final    Alk.  phosphatase 07/07/2020 71  39 - 117 IU/L Final    AST (SGOT) 07/07/2020 14  0 - 40 IU/L Final    ALT (SGPT) 07/07/2020 12  0 - 32 IU/L Final    TSH 07/07/2020 0.655  0.450 - 4.500 uIU/mL Final    WBC 07/07/2020 4.3  3.4 - 10.8 x10E3/uL Final    RBC 07/07/2020 4.19  3.77 - 5.28 x10E6/uL Final    HGB 07/07/2020 12.7  11.1 - 15.9 g/dL Final    HCT 07/07/2020 37.6  34.0 - 46.6 % Final    MCV 07/07/2020 90  79 - 97 fL Final    MCH 07/07/2020 30.3  26.6 - 33.0 pg Final    MCHC 07/07/2020 33.8  31.5 - 35.7 g/dL Final    RDW 07/07/2020 12.8  11.7 - 15.4 % Final    PLATELET 12/41/1971 267  150 - 450 x10E3/uL Final     Review of Systems   Constitutional: Negative for malaise/fatigue and weight loss. HENT: Negative for congestion and sore throat. Eyes: Negative for blurred vision. Respiratory: Negative for cough and shortness of breath. Cardiovascular: Negative for chest pain and leg swelling. Gastrointestinal: Negative for constipation and heartburn. Genitourinary: Negative for frequency and urgency. Musculoskeletal: Positive for myalgias (axillary tenderness/bump). Negative for back pain and joint pain. Skin: Positive for itching (L lower leg). Neurological: Negative for dizziness and headaches. Psychiatric/Behavioral: Negative for depression. The patient is not nervous/anxious and does not have insomnia. Visit Vitals  /84 (BP 1 Location: Left arm, BP Patient Position: Sitting)   Pulse 92   Temp 97.4 °F (36.3 °C) (Oral)   Resp 16   Ht 5' (1.524 m)   Wt 162 lb (73.5 kg)   LMP 12/04/2012   SpO2 100%   BMI 31.64 kg/m²     Physical Exam  Vitals signs and nursing note reviewed. Constitutional:       General: She is not in acute distress. Appearance: Normal appearance. She is well-developed and well-groomed. She is obese. She is not diaphoretic. HENT:      Right Ear: External ear normal. A middle ear effusion is present. Left Ear: External ear normal. A middle ear effusion is present. Eyes:      General: No scleral icterus. Right eye: No discharge. Left eye: No discharge. Extraocular Movements: Extraocular movements intact.    Neck: Musculoskeletal: Normal range of motion and neck supple. Cardiovascular:      Rate and Rhythm: Normal rate and regular rhythm. Pulmonary:      Effort: Pulmonary effort is normal.      Breath sounds: Normal breath sounds. No wheezing. Musculoskeletal:        Arms:       Right lower leg: No edema. Left lower leg: No edema. Legs:    Feet:      Comments: Diabetic foot exam:   Left: Vibratory sensation normal    Sharp/dull discrimination normal    Filament test normal sensation with micro filament   Pulse DP: 2+ (normal)   Deformities: None  Right: Vibratory sensation normal   Sharp/dull discrimination normal   Filament test normal sensation with micro filament   Pulse DP: 2+ (normal)   Deformities: None  Lymphadenopathy:      Cervical: No cervical adenopathy. Neurological:      Mental Status: She is alert and oriented to person, place, and time. Psychiatric:         Mood and Affect: Mood and affect normal.         Behavior: Behavior normal.       ASSESSMENT and PLAN    ICD-10-CM ICD-9-CM    1. Multiple insect bites  W57. XXXA 919.4 Insect bites have scabbed and show no sign of infection. E906.4    2. Well controlled diabetes mellitus ([facility] [address]  E11.9 250.00 HM DIABETES FOOT EXAM    Diabetic foot exam normal.      3. Hypertension, unspecified type  I10 401.9 BP is well-controlled on current medication. No change to dosage at this time. 4. Chronic midline low back pain without sciatica  M54.5 724.2 diclofenac (VOLTAREN) 1 % gel sent to pharmacy. I refilled her Voltaren gel. G89.29 338.29    5. Sensation of fullness in both ears  H93.8X3 388.8 I recommended for her to take OTC antihistamines as this is common time of year for seasonal allergies to flare up in patients. 6. Lipoma of right axilla  D17.21 214.1 We discussed that the bump in her R axilla is a lipoma, and I informed her that this is nothing to be concerned about unless it becomes very painful.        This plan was reviewed with the patient and patient agrees. All questions were answered. This scribe documentation was reviewed by me and accurately reflects the examination and decisions made by me.

## 2020-11-05 ENCOUNTER — TRANSCRIBE ORDER (OUTPATIENT)
Dept: SCHEDULING | Age: 65
End: 2020-11-05

## 2020-11-05 DIAGNOSIS — Z12.31 SCREENING MAMMOGRAM FOR HIGH-RISK PATIENT: Primary | ICD-10-CM

## 2020-11-09 ENCOUNTER — TELEPHONE (OUTPATIENT)
Dept: PRIMARY CARE CLINIC | Age: 65
End: 2020-11-09

## 2020-11-09 DIAGNOSIS — B37.0 ORAL THRUSH: Primary | ICD-10-CM

## 2020-11-09 RX ORDER — NYSTATIN 100000 [USP'U]/ML
1 SUSPENSION ORAL 4 TIMES DAILY
Qty: 200 ML | Refills: 0 | Status: SHIPPED | OUTPATIENT
Start: 2020-11-09 | End: 2020-11-19

## 2020-11-09 NOTE — TELEPHONE ENCOUNTER
Patient states she was seen last week. She is calling about the little red blisters on the tip of her tongue. She wanted if she can get something for it. It doesn't hurt, but  Is annoying. She says it goes away after brushing her tongue. She feels it may be an infection. She works from 10-7 today but can be reached between 2-3 or she gave permission to eave a voice mail.

## 2020-11-09 NOTE — TELEPHONE ENCOUNTER
Message sent to patient via [employer] due to not being able to call during that window of time that she requested

## 2020-11-20 ENCOUNTER — VIRTUAL VISIT (OUTPATIENT)
Dept: PRIMARY CARE CLINIC | Age: 65
End: 2020-11-20
Payer: COMMERCIAL

## 2020-11-20 DIAGNOSIS — E66.9 OBESITY (BMI 30.0-34.9): ICD-10-CM

## 2020-11-20 DIAGNOSIS — K30 PERSISTENT INDIGESTION: ICD-10-CM

## 2020-11-20 DIAGNOSIS — B37.0 ORAL THRUSH: Primary | ICD-10-CM

## 2020-11-20 DIAGNOSIS — I10 HYPERTENSION, UNSPECIFIED TYPE: ICD-10-CM

## 2020-11-20 PROCEDURE — 99213 OFFICE O/P EST LOW 20 MIN: CPT | Performed by: INTERNAL MEDICINE

## 2020-11-20 RX ORDER — NYSTATIN 100000 [USP'U]/ML
1 SUSPENSION ORAL 4 TIMES DAILY
Qty: 200 ML | Refills: 0 | Status: SHIPPED | OUTPATIENT
Start: 2020-11-20 | End: 2020-11-30

## 2020-11-20 RX ORDER — FLUCONAZOLE 150 MG/1
150 TABLET ORAL DAILY
Qty: 3 TAB | Refills: 0 | Status: SHIPPED | OUTPATIENT
Start: 2020-11-20 | End: 2020-11-24

## 2020-11-20 RX ORDER — OMEPRAZOLE 40 MG/1
40 CAPSULE, DELAYED RELEASE ORAL DAILY
Qty: 30 CAP | Refills: 0 | Status: SHIPPED | OUTPATIENT
Start: 2020-11-20 | End: 2020-12-20

## 2020-11-20 NOTE — PROGRESS NOTES
Written by Kyle Daniel, as dictated by Dr. Fidel Madison MD.    Demetrio Gonzalez is a 60 y.o. female. HPI  I was in the office while conducting this encounter. Consent:  She and/or her healthcare decision maker is aware that this patient-initiated Telehealth encounter is a billable service, with coverage as determined by her insurance carrier. She is aware that she may receive a bill and has provided verbal consent to proceed: Yes    This virtual visit was conducted via [employer]. Pursuant to the emergency declaration under the [facility], [facility] waiver authority and the [employer] Act, this Virtual  Visit was conducted to reduce the patient's risk of exposure to COVID-19 and provide continuity of care for an established patient. Services were provided through a video synchronous discussion virtually to substitute for in-person clinic visit. Due to this being a TeleHealth evaluation, many elements of the physical examination are unable to be assessed. Pt presents virtually today to follow up on her oral thrush. She as only been using the mouth wash about twice a day instead of QID because she has trouble finding time to do it while at work. She is getting a lesion on the tip of her tongue, and it goes away when she brushes her tongue and cleans it off, but it keeps coming back. She has recently been taking Keto diet pills and inquires if this may have caused the issue. She is doing the keto diet with the purpose of losing weight. Her BP has been stable and running in normal ranges on this diet. She has been getting indigestion from everything she eats recently, and she has even stopped eating foods such as onions and peppers. She is not currently taking any medication for heartburn.        Patient Active Problem List   Diagnosis Code    Hypothyroidism E03.9    Anemia D64.9  Hyperlipidemia E78.5    Essential hypertension I10    Well controlled diabetes mellitus ([facility] [address] E11.9        Current Outpatient Medications on File Prior to Visit   Medication Sig Dispense Refill    [] nystatin (MYCOSTATIN) 100,000 unit/mL suspension Take 5 mL by mouth four (4) times daily for 10 days. swish and spit 200 mL 0    diclofenac (VOLTAREN) 1 % gel Apply 4 g to affected area four (4) times daily for 30 days. 100 g 0    pravastatin (PRAVACHOL) 40 mg tablet Take 1 tablet by mouth once daily 90 Tab 0    lisinopriL (PRINIVIL, ZESTRIL) 10 mg tablet TAKE ONE TABLET BY MOUTH ONE TIME DAILY 90 Tab 0    metFORMIN ER (GLUCOPHAGE XR) 500 mg tablet TAKE ONE TABLET BY MOUTH ONE TIME DAILY WITH DINNER 90 Tab 0    levothyroxine (SYNTHROID) 88 mcg tablet TAKE ONE TABLET BY MOUTH EVERY MORNING BEFORE BREAKFAST 90 Tab 1    VITAMIN D3 1,000 unit tablet TAKE ONE TABLET BY MOUTH ONE TIME DAILY 30 Tab 2    Blood-Glucose Meter monitoring kit Please use four times a day and as needed. Dx. E11.9 1 Kit 0    glucose blood VI test strips (BLOOD GLUCOSE TEST) strip Use to test blood sugars 3 times a day and as needed DX. E11.9 100 Strip 6    Lancets misc Use to check blood sugars 3 times daily and as needed 200 Each 11    aspirin delayed-release (ASPIRIN EC) 81 mg tablet Take 81 mg by mouth daily. No current facility-administered medications on file prior to visit.         No Known Allergies    Past Medical History:   Diagnosis Date    Anemia     High cholesterol     Thyroid disease     tyroid nodule removed        Past Surgical History:   Procedure Laterality Date    HX HEENT      THYROIDECTOMY         Family History   Problem Relation Age of Onset    Diabetes Mother     Hypertension Mother     High Cholesterol Mother     Diabetes Sister     Hypertension Sister     Stroke Sister     Diabetes Brother     Hypertension Brother     Diabetes Maternal Grandmother     Heart Disease Maternal Grandmother        Social History     Socioeconomic History    Marital status: LEGALLY      Spouse name: Not on file    Number of children: Not on file    Years of education: Not on file    Highest education level: Not on file   Occupational History    Not on file   Social Needs    Financial resource strain: Not on file    Food insecurity     Worry: Not on file     Inability: Not on file    Transportation needs     Medical: Not on file     Non-medical: Not on file   Tobacco Use    Smoking status: Former Smoker     Packs/day: 0.50     Years: 30.00     Pack years: 15.00     Types: Cigarettes     Last attempt to quit: 3/1/2019     Years since quittin.7    Smokeless tobacco: Never Used   Substance and Sexual Activity    Alcohol use: No    Drug use: No    Sexual activity: Yes   Lifestyle    Physical activity     Days per week: Not on file     Minutes per session: Not on file    Stress: Not on file   Relationships    Social connections     Talks on phone: Not on file     Gets together: Not on file     Attends Bahai service: Not on file     Active member of club or organization: Not on file     Attends meetings of clubs or organizations: Not on file     Relationship status: Not on file    Intimate partner violence     Fear of current or ex partner: Not on file     Emotionally abused: Not on file     Physically abused: Not on file     Forced sexual activity: Not on file   Other Topics Concern    Not on file   Social History Narrative    Not on file       No visits with results within 3 Month(s) from this visit.    Latest known visit with results is:   Office Visit on 2020   Component Date Value Ref Range Status    Hemoglobin A1c 2020 6.1* 4.8 - 5.6 % Final    Comment:          Prediabetes: 5.7 - 6.4           Diabetes: >6.4           Glycemic control for adults with diabetes: <7.0      Estimated average glucose 2020 128  mg/dL Final    Cholesterol, total 2020 164 100 - 199 mg/dL Final    Triglyceride 07/07/2020 87  0 - 149 mg/dL Final    HDL Cholesterol 07/07/2020 67  >39 mg/dL Final    VLDL, calculated 07/07/2020 17  5 - 40 mg/dL Final    LDL, calculated 07/07/2020 80  0 - 99 mg/dL Final    Glucose 07/07/2020 86  65 - 99 mg/dL Final    BUN 07/07/2020 16  8 - 27 mg/dL Final    Creatinine 07/07/2020 0.81  0.57 - 1.00 mg/dL Final    GFR est non-AA 07/07/2020 78  >59 mL/min/1.73 Final    GFR est AA 07/07/2020 89  >59 mL/min/1.73 Final    BUN/Creatinine ratio 07/07/2020 20  12 - 28 Final    Sodium 07/07/2020 137  134 - 144 mmol/L Final    Potassium 07/07/2020 4.6  3.5 - 5.2 mmol/L Final    Chloride 07/07/2020 101  96 - 106 mmol/L Final    CO2 07/07/2020 21  20 - 29 mmol/L Final    Calcium 07/07/2020 9.9  8.7 - 10.3 mg/dL Final    Protein, total 07/07/2020 7.1  6.0 - 8.5 g/dL Final    Albumin 07/07/2020 4.8  3.8 - 4.8 g/dL Final    GLOBULIN, TOTAL 07/07/2020 2.3  1.5 - 4.5 g/dL Final    A-G Ratio 07/07/2020 2.1  1.2 - 2.2 Final    Bilirubin, total 07/07/2020 0.5  0.0 - 1.2 mg/dL Final    Alk. phosphatase 07/07/2020 71  39 - 117 IU/L Final    AST (SGOT) 07/07/2020 14  0 - 40 IU/L Final    ALT (SGPT) 07/07/2020 12  0 - 32 IU/L Final    TSH 07/07/2020 0.655  0.450 - 4.500 uIU/mL Final    WBC 07/07/2020 4.3  3.4 - 10.8 x10E3/uL Final    RBC 07/07/2020 4.19  3.77 - 5.28 x10E6/uL Final    HGB 07/07/2020 12.7  11.1 - 15.9 g/dL Final    HCT 07/07/2020 37.6  34.0 - 46.6 % Final    MCV 07/07/2020 90  79 - 97 fL Final    MCH 07/07/2020 30.3  26.6 - 33.0 pg Final    MCHC 07/07/2020 33.8  31.5 - 35.7 g/dL Final    RDW 07/07/2020 12.8  11.7 - 15.4 % Final    PLATELET 18/49/4625 878  150 - 450 x10E3/uL Final     Review of Systems   Constitutional: Negative for malaise/fatigue and weight loss. HENT: Negative for congestion and sore throat.         +tongue lesions   Eyes: Negative for blurred vision. Respiratory: Negative for cough and shortness of breath. Cardiovascular: Negative for chest pain and leg swelling. Gastrointestinal: Positive for heartburn. Negative for constipation. Genitourinary: Negative for frequency and urgency. Musculoskeletal: Negative for back pain, joint pain and myalgias. Neurological: Negative for dizziness and headaches. Psychiatric/Behavioral: Negative for depression. The patient is not nervous/anxious and does not have insomnia. Visit Vitals  LMP 12/04/2012     Physical Exam  Nursing note reviewed. Constitutional:       Appearance: Normal appearance. She is well-developed and well-groomed. HENT:      Head: Normocephalic and atraumatic. Nose: No congestion. Eyes:      General:         Right eye: No discharge. Left eye: No discharge. Pulmonary:      Effort: Pulmonary effort is normal.      Breath sounds: No wheezing. Neurological:      Mental Status: She is alert and oriented to person, place, and time. Psychiatric:         Mood and Affect: Mood normal.         Behavior: Behavior normal.       ASSESSMENT and PLAN    ICD-10-CM ICD-9-CM    1. Oral thrush  B37.0 112.0 nystatin (MYCOSTATIN) 100,000 unit/mL suspension sent to pharmacy. fluconazole (DIFLUCAN) 150 mg tablet sent to pharmacy. Potential side effects were discussed. I prescribed diflucan and nystatin and instructed her to continues with the mouthwash and add in the oral tablets. She should let me know if this still does not go away. 2. Obesity (BMI 30.0-34. 9)  E66.9 278.00 I commended the pt on their healthy lifestyle choices and routines. Explained that they should be walking 5,000 steps daily to maintain conditioning and weight and increase to at least 10,000 steps to work on losing weight. 3. Hypertension, unspecified type  I10 401.9 BP is well-controlled on current medication. No change to dosage at this time. 4. Persistent indigestion  K30 536.8 omeprazole (PRILOSEC) 40 mg capsule sent to pharmacy.  Potential side effects were discussed. I prescribed omeprazole and instructed her to take it qPM after her levothyroxine but before breakfast.        This plan was reviewed with the patient and patient agrees. All questions were answered. This scribe documentation was reviewed by me and accurately reflects the examination and decisions made by me.

## 2020-11-24 DIAGNOSIS — B37.0 ORAL THRUSH: ICD-10-CM

## 2020-11-24 RX ORDER — FLUCONAZOLE 150 MG/1
TABLET ORAL
Qty: 3 TAB | Refills: 0 | Status: SHIPPED | OUTPATIENT
Start: 2020-11-24 | End: 2020-12-22 | Stop reason: ALTCHOICE

## 2020-11-24 NOTE — TELEPHONE ENCOUNTER
----- Message from Dave Donaldson sent at 11/24/2020  1:46 PM EST -----  Regarding: Dr. Francesco Hopper (if not patient):  Marilia Akers with [facility]       Relationship of caller (if not patient):  Pharmacy tech       Best contact number(s): [phone]      Name of medication and dosage if known:  Fluconazole 150 mg      Is patient out of this medication (yes/no): yes      Pharmacy name: [address] [place] [address] listed in chart? (yes/no): yes  Pharmacy phone number:  [phone]      Details to clarify the request:  pt lost her medication and need another script so they can file a lost prescription form with the insurance to get her more medication.       Dave Donaldson

## 2020-11-28 DIAGNOSIS — E78.00 HYPERCHOLESTEROLEMIA: ICD-10-CM

## 2020-11-29 DIAGNOSIS — B37.0 ORAL THRUSH: ICD-10-CM

## 2020-11-29 RX ORDER — PRAVASTATIN SODIUM 40 MG/1
TABLET ORAL
Qty: 90 TAB | Refills: 0 | Status: SHIPPED | OUTPATIENT
Start: 2020-11-29 | End: 2021-03-04 | Stop reason: SDUPTHER

## 2020-11-30 DIAGNOSIS — B37.81 ESOPHAGEAL THRUSH (HCC): Primary | ICD-10-CM

## 2020-11-30 DIAGNOSIS — I10 HYPERTENSION, UNSPECIFIED TYPE: ICD-10-CM

## 2020-11-30 RX ORDER — FLUCONAZOLE 150 MG/1
TABLET ORAL
Qty: 3 TAB | Refills: 0 | OUTPATIENT
Start: 2020-11-30

## 2020-11-30 RX ORDER — LISINOPRIL 10 MG/1
TABLET ORAL
Qty: 90 TAB | Refills: 0 | Status: SHIPPED | OUTPATIENT
Start: 2020-11-30 | End: 2021-01-28

## 2020-11-30 RX ORDER — FLUCONAZOLE 150 MG/1
150 TABLET ORAL DAILY
Qty: 3 TAB | Refills: 0 | Status: SHIPPED | OUTPATIENT
Start: 2020-11-30 | End: 2020-12-03

## 2020-11-30 RX ORDER — NYSTATIN 100000 [USP'U]/ML
1 SUSPENSION ORAL 4 TIMES DAILY
Qty: 200 ML | Refills: 0 | OUTPATIENT
Start: 2020-11-30 | End: 2020-12-10

## 2020-11-30 NOTE — TELEPHONE ENCOUNTER
Pt is stating that the oral issue is not resolving and would like a phone call back asap. It has been going on for over a month.

## 2020-12-01 NOTE — TELEPHONE ENCOUNTER
Left voice message that an appointment is needed to re evaluate what is going on with her mouth sores.

## 2020-12-04 ENCOUNTER — TELEPHONE (OUTPATIENT)
Dept: PRIMARY CARE CLINIC | Age: 65
End: 2020-12-04

## 2020-12-04 NOTE — TELEPHONE ENCOUNTER
Pt would like a recommendation to an ENT doctor and needs a referral since her oral thrush issue is not getting any better. Pt would like a call back from the nurse or Dr. Maggie Maciel.

## 2020-12-04 NOTE — TELEPHONE ENCOUNTER
Returned call to patient. She wanted to know if there is a specific ENT that she has to see. Advised her that she can see whomever her ins covers.  She thinks she has someone in mind and will call back with the information

## 2020-12-16 DIAGNOSIS — E11.9 WELL CONTROLLED DIABETES MELLITUS (HCC): ICD-10-CM

## 2020-12-16 RX ORDER — METFORMIN HYDROCHLORIDE 500 MG/1
TABLET, EXTENDED RELEASE ORAL
Qty: 90 TAB | Refills: 0 | Status: SHIPPED | OUTPATIENT
Start: 2020-12-16 | End: 2021-02-16

## 2020-12-22 ENCOUNTER — HOSPITAL ENCOUNTER (OUTPATIENT)
Dept: MAMMOGRAPHY | Age: 65
Discharge: HOME | End: 2020-12-22
Payer: COMMERCIAL

## 2020-12-22 ENCOUNTER — OFFICE VISIT (OUTPATIENT)
Dept: PRIMARY CARE CLINIC | Age: 65
End: 2020-12-22
Payer: COMMERCIAL

## 2020-12-22 VITALS
HEART RATE: 79 BPM | TEMPERATURE: 97.7 F | RESPIRATION RATE: 18 BRPM | WEIGHT: 161.8 LBS | DIASTOLIC BLOOD PRESSURE: 80 MMHG | HEIGHT: 60 IN | BODY MASS INDEX: 31.77 KG/M2 | SYSTOLIC BLOOD PRESSURE: 116 MMHG | OXYGEN SATURATION: 98 %

## 2020-12-22 DIAGNOSIS — E11.9 WELL CONTROLLED DIABETES MELLITUS (HCC): Primary | ICD-10-CM

## 2020-12-22 DIAGNOSIS — K30 PERSISTENT INDIGESTION: ICD-10-CM

## 2020-12-22 DIAGNOSIS — B37.2 MUCOCUTANEOUS CANDIDIASIS: ICD-10-CM

## 2020-12-22 DIAGNOSIS — E03.9 ACQUIRED HYPOTHYROIDISM: ICD-10-CM

## 2020-12-22 DIAGNOSIS — E55.9 VITAMIN D DEFICIENCY: ICD-10-CM

## 2020-12-22 DIAGNOSIS — I10 ESSENTIAL HYPERTENSION: ICD-10-CM

## 2020-12-22 DIAGNOSIS — Z12.31 SCREENING MAMMOGRAM FOR HIGH-RISK PATIENT: ICD-10-CM

## 2020-12-22 DIAGNOSIS — E78.2 MIXED HYPERLIPIDEMIA: ICD-10-CM

## 2020-12-22 LAB
ALBUMIN UR QL STRIP: 10 MG/L
CREATININE, URINE POC: 100 MG/DL
MICROALBUMIN/CREAT RATIO POC: <30 MG/G

## 2020-12-22 PROCEDURE — 99214 OFFICE O/P EST MOD 30 MIN: CPT | Performed by: INTERNAL MEDICINE

## 2020-12-22 PROCEDURE — 77063 BREAST TOMOSYNTHESIS BI: CPT

## 2020-12-22 PROCEDURE — 82044 UR ALBUMIN SEMIQUANTITATIVE: CPT | Performed by: INTERNAL MEDICINE

## 2020-12-22 RX ORDER — FAMOTIDINE 40 MG/1
40 TABLET, FILM COATED ORAL DAILY
Qty: 30 TAB | Refills: 1 | Status: SHIPPED | OUTPATIENT
Start: 2020-12-22 | End: 2021-01-21

## 2020-12-22 NOTE — PROGRESS NOTES
Written by Johnell Olszewski, as dictated by Dr. Vic Gutiérrez MD.    Marva Ramirez is a 60 y.o. female. HPI  Pt presents today to follow up on diabetic care management. She continues on metformin  mg every day. She went to see the ENT and reports that she gave her the same treatments which she was prescribed by me. She has seen some improvements in this and notes that the white patches on her tongue only appear after eating. Her tongue does not hurt except occasionally if she is brushing her teeth. She has been tired recently and has been wondering if her thyroid is off. She is fasting for labs today. She has been struggling with constipation and heartburn depending on what she eats. The acid reflux gets worse if she has foods like onions and salads because she cannot chew these completely. She takes omeprazole PRN for this. Her work has been stressful because she has so much exposure to [employer]. She usually works as a  for [employer] but has been moved to working as a  right now. She works 40 hours a week and is tired from this. Patient Active Problem List   Diagnosis Code    Hypothyroidism E03.9    Anemia D64.9    Hyperlipidemia E78.5    Essential hypertension I10    Well controlled diabetes mellitus ([facility] [address] [address] E11.9        Current Outpatient Medications on File Prior to Visit   Medication Sig Dispense Refill    metFORMIN ER (GLUCOPHAGE XR) 500 mg tablet TAKE ONE TABLET BY MOUTH ONE TIME DAILY WITH DINNER 90 Tab 0    lisinopriL (PRINIVIL, ZESTRIL) 10 mg tablet TAKE ONE TABLET BY MOUTH ONE TIME DAILY 90 Tab 0    pravastatin (PRAVACHOL) 40 mg tablet Take 1 tablet by mouth once daily 90 Tab 0    levothyroxine (SYNTHROID) 88 mcg tablet TAKE ONE TABLET BY MOUTH EVERY MORNING BEFORE BREAKFAST 90 Tab 1    Blood-Glucose Meter monitoring kit Please use four times a day and as needed.  Dx. E11.9 1 Kit 0    glucose blood VI test strips (BLOOD GLUCOSE TEST) strip Use to test blood sugars 3 times a day and as needed DX. E11.9 100 Strip 6    Lancets misc Use to check blood sugars 3 times daily and as needed 200 Each 11    aspirin delayed-release (ASPIRIN EC) 81 mg tablet Take 81 mg by mouth daily.  [DISCONTINUED] fluconazole (DIFLUCAN) 150 mg tablet TAKE ONE TABLET BY MOUTH ONE TIME DAILY 3 Tab 0    VITAMIN D3 1,000 unit tablet TAKE ONE TABLET BY MOUTH ONE TIME DAILY 30 Tab 2     No current facility-administered medications on file prior to visit.         No Known Allergies    Past Medical History:   Diagnosis Date    Anemia     High cholesterol     Thyroid disease     tyroid nodule removed        Past Surgical History:   Procedure Laterality Date    HX HEENT      THYROIDECTOMY         Family History   Problem Relation Age of Onset    Diabetes Mother     Hypertension Mother     High Cholesterol Mother     Diabetes Sister     Hypertension Sister     Stroke Sister     Diabetes Brother     Hypertension Brother     Diabetes Maternal Grandmother     Heart Disease Maternal Grandmother        Social History     Socioeconomic History    Marital status: LEGALLY      Spouse name: Not on file    Number of children: Not on file    Years of education: Not on file    Highest education level: Not on file   Occupational History    Not on file   Social Needs    Financial resource strain: Not on file    Food insecurity     Worry: Not on file     Inability: Not on file    Transportation needs     Medical: Not on file     Non-medical: Not on file   Tobacco Use    Smoking status: Former Smoker     Packs/day: 0.50     Years: 30.00     Pack years: 15.00     Types: Cigarettes     Quit date: 3/1/2019     Years since quittin.8    Smokeless tobacco: Never Used   Substance and Sexual Activity    Alcohol use: No    Drug use: No    Sexual activity: Yes   Lifestyle    Physical activity     Days per week: Not on file     Minutes per session: Not on file    Stress: Not on file   Relationships    Social connections     Talks on phone: Not on file     Gets together: Not on file     Attends Nondenominational service: Not on file     Active member of club or organization: Not on file     Attends meetings of clubs or organizations: Not on file     Relationship status: Not on file    Intimate partner violence     Fear of current or ex partner: Not on file     Emotionally abused: Not on file     Physically abused: Not on file     Forced sexual activity: Not on file   Other Topics Concern    Not on file   Social History Narrative    Not on file       Office Visit on 12/22/2020   Component Date Value Ref Range Status    ALBUMIN, URINE POC 12/22/2020 10  Negative mg/L Final    CREATININE, URINE POC 12/22/2020 100  mg/dL Final    Microalbumin/creat ratio (POC) 12/22/2020 <30  <30 MG/G Final     Review of Systems   Constitutional: Positive for malaise/fatigue. Negative for weight loss. HENT: Negative for congestion and sore throat.         +white patches in mouth   Eyes: Negative for blurred vision. Respiratory: Negative for cough and shortness of breath. Cardiovascular: Negative for chest pain and leg swelling. Gastrointestinal: Positive for constipation and heartburn (occasional). Genitourinary: Negative for frequency and urgency. Musculoskeletal: Negative for back pain, joint pain and myalgias. Neurological: Negative for dizziness and headaches. Psychiatric/Behavioral: Negative for depression. The patient is not nervous/anxious and does not have insomnia. Visit Vitals  /80 (BP 1 Location: Left arm, BP Patient Position: Sitting)   Pulse 79   Temp 97.7 °F (36.5 °C) (Temporal)   Resp 18   Ht 5' (1.524 m)   Wt 161 lb 12.8 oz (73.4 kg)   LMP 12/04/2012   SpO2 98%   BMI 31.60 kg/m²     Physical Exam  Vitals signs and nursing note reviewed. Constitutional:       General: She is not in acute distress. Appearance: Normal appearance.  She is well-developed, well-groomed and normal weight. She is not diaphoretic. HENT:      Right Ear: Tympanic membrane, ear canal and external ear normal.      Left Ear: Tympanic membrane, ear canal and external ear normal.      Mouth/Throat:      Mouth: Mucous membranes are moist.      Pharynx: Oropharynx is clear. Eyes:      General: No scleral icterus. Right eye: No discharge. Left eye: No discharge. Extraocular Movements: Extraocular movements intact. Neck:      Musculoskeletal: Normal range of motion and neck supple. Thyroid: No thyromegaly. Cardiovascular:      Rate and Rhythm: Normal rate and regular rhythm. Pulses: Normal pulses. Dorsalis pedis pulses are 2+ on the right side and 2+ on the left side. Pulmonary:      Effort: Pulmonary effort is normal.      Breath sounds: Normal breath sounds. No wheezing. Abdominal:      General: Bowel sounds are normal. There is no distension. Tenderness: There is no abdominal tenderness. Musculoskeletal:      Right lower leg: No edema. Left lower leg: No edema. Comments: B/L knees without crepitus   Lymphadenopathy:      Cervical: No cervical adenopathy. Neurological:      Mental Status: She is alert and oriented to person, place, and time. Deep Tendon Reflexes:      Reflex Scores:       Patellar reflexes are 2+ on the right side and 2+ on the left side. Psychiatric:         Mood and Affect: Mood and affect normal.         Behavior: Behavior normal.       ASSESSMENT and PLAN    ICD-10-CM ICD-9-CM    1. Well controlled diabetes mellitus ([facility] [address]  E11.9 250.00 AMB POC URINE, MICROALBUMIN, SEMIQUANT (3 RESULTS)      HEMOGLOBIN A1C WITH EAG    Labs drawn in office today. Urine sample obtained in office today. 2. Mucocutaneous candidiasis  B37.2 112.3 I instructed her to continue following up with the ENT about her mouth.  Explained that if this treatment does not work, they will want to know that so they can try something else to help her. 3. Essential hypertension  I10 401.9 BP is well-controlled on current medication. No change to dosage at this time. 4. Mixed hyperlipidemia  Y07.6 022.6 METABOLIC PANEL, COMPREHENSIVE      CBC W/O DIFF      LIPID PANEL    Ordered fasting labs for pt to complete today in office. Waiting on results. 5. Acquired hypothyroidism  E03.9 244.9 TSH 3RD GENERATION    Check TSH. 6. Vitamin D deficiency  E55.9 268.9 VITAMIN D, 25 HYDROXY    Check vitamin D.     7. Persistent indigestion  K30 536.8 famotidine (PEPCID) 40 mg tablet sent to pharmacy. Potential side effects were discussed. I prescribed famotidine for her to take PRN and explained that it has less sfx than omeprazole. This plan was reviewed with the patient and patient agrees. All questions were answered. This scribe documentation was reviewed by me and accurately reflects the examination and decisions made by me.

## 2020-12-22 NOTE — PROGRESS NOTES
Chief Complaint   Patient presents with   [address] [place] [address] Diabetes Care Management      labs

## 2021-01-28 DIAGNOSIS — I10 HYPERTENSION, UNSPECIFIED TYPE: ICD-10-CM

## 2021-01-28 DIAGNOSIS — E03.9 ACQUIRED HYPOTHYROIDISM: ICD-10-CM

## 2021-01-28 RX ORDER — LEVOTHYROXINE SODIUM 88 UG/1
TABLET ORAL
Qty: 90 TAB | Refills: 1 | Status: SHIPPED | OUTPATIENT
Start: 2021-01-28 | End: 2021-10-03 | Stop reason: SDUPTHER

## 2021-01-28 RX ORDER — LISINOPRIL 10 MG/1
TABLET ORAL
Qty: 90 TAB | Refills: 0 | Status: SHIPPED | OUTPATIENT
Start: 2021-01-28 | End: 2021-05-02

## 2021-02-08 NOTE — TELEPHONE ENCOUNTER
FYI:    Patient wants all future Rx refills to go to:    [id number]  Kenisha Whelan, [address] [place] [address]   F: [phone]  P: [phone]

## 2021-02-08 NOTE — TELEPHONE ENCOUNTER
[id number]  [place], [address] [place] [address]              F: [phone]  P: [phone]    Patient wants all future refills to go here.

## 2021-02-16 DIAGNOSIS — E11.9 WELL CONTROLLED DIABETES MELLITUS (HCC): ICD-10-CM

## 2021-02-16 RX ORDER — METFORMIN HYDROCHLORIDE 500 MG/1
TABLET, EXTENDED RELEASE ORAL
Qty: 90 TAB | Refills: 0 | Status: SHIPPED | OUTPATIENT
Start: 2021-02-16 | End: 2021-05-02

## 2021-03-04 DIAGNOSIS — E78.00 HYPERCHOLESTEROLEMIA: ICD-10-CM

## 2021-03-04 RX ORDER — PRAVASTATIN SODIUM 40 MG/1
TABLET ORAL
Qty: 90 TAB | Refills: 0 | Status: SHIPPED | OUTPATIENT
Start: 2021-03-04 | End: 2021-06-14

## 2021-03-22 ENCOUNTER — OFFICE VISIT (OUTPATIENT)
Dept: PRIMARY CARE CLINIC | Age: 66
End: 2021-03-22
Payer: COMMERCIAL

## 2021-03-22 VITALS
RESPIRATION RATE: 16 BRPM | HEIGHT: 60 IN | OXYGEN SATURATION: 98 % | DIASTOLIC BLOOD PRESSURE: 80 MMHG | BODY MASS INDEX: 32.2 KG/M2 | TEMPERATURE: 97.5 F | SYSTOLIC BLOOD PRESSURE: 118 MMHG | HEART RATE: 82 BPM | WEIGHT: 164 LBS

## 2021-03-22 DIAGNOSIS — E03.9 ACQUIRED HYPOTHYROIDISM: ICD-10-CM

## 2021-03-22 DIAGNOSIS — M79.641 PAIN OF RIGHT HAND: ICD-10-CM

## 2021-03-22 DIAGNOSIS — M79.671 PAIN IN BOTH FEET: ICD-10-CM

## 2021-03-22 DIAGNOSIS — M79.672 PAIN IN BOTH FEET: ICD-10-CM

## 2021-03-22 DIAGNOSIS — Z91.018 MULTIPLE FOOD ALLERGIES: ICD-10-CM

## 2021-03-22 DIAGNOSIS — K21.9 GASTROESOPHAGEAL REFLUX DISEASE WITHOUT ESOPHAGITIS: ICD-10-CM

## 2021-03-22 DIAGNOSIS — E55.9 VITAMIN D DEFICIENCY: ICD-10-CM

## 2021-03-22 DIAGNOSIS — G89.29 CHRONIC MIDLINE LOW BACK PAIN WITHOUT SCIATICA: Primary | ICD-10-CM

## 2021-03-22 DIAGNOSIS — M54.50 CHRONIC MIDLINE LOW BACK PAIN WITHOUT SCIATICA: Primary | ICD-10-CM

## 2021-03-22 DIAGNOSIS — N95.1 HOT FLASHES DUE TO MENOPAUSE: ICD-10-CM

## 2021-03-22 PROCEDURE — 99214 OFFICE O/P EST MOD 30 MIN: CPT | Performed by: INTERNAL MEDICINE

## 2021-03-22 RX ORDER — DICLOFENAC SODIUM 10 MG/G
2 GEL TOPICAL 4 TIMES DAILY
Qty: 100 G | Refills: 0 | Status: SHIPPED | OUTPATIENT
Start: 2021-03-22 | End: 2021-04-21

## 2021-03-22 RX ORDER — FAMOTIDINE 40 MG/1
40 TABLET, FILM COATED ORAL DAILY
Qty: 30 TAB | Refills: 1 | Status: SHIPPED | OUTPATIENT
Start: 2021-03-22 | End: 2021-04-21

## 2021-03-22 NOTE — PROGRESS NOTES
Written by Marion Vegas, as dictated by Dr. Brittaney Roldan MD.    Orestes Patel (: 1955) is a 60 y.o. female, established patient, here for evaluation of the following chief complaint(s):  Back Pain (has issues with back pain and down the leg .  stands on her feet all the time. states that she is having digestive problem but did not go back to allergy doctor she cant afford problem with black pepper and green beans and tongue has not healed. ), Other (hot flashes and she is getting them very bad. does not have obgyn doctor. doesnt want one ), and Foot Pain (left foot, bought new shoes she does have a podiatrist on the left foot the top of it.  hurts at the ball of the foot too)       ASSESSMENT/PLAN:  1. Chronic midline low back pain without sciatica  -     diclofenac (VOLTAREN) 1 % gel; Apply 2 g to affected area four (4) times daily for 30 days. , Normal, Disp-100 g, R-0 sent to pharmacy. I refilled her Voltaren and encouraged her to continue with her supportive shoes since this change has helped her. 2. Pain in both feet  -     diclofenac (VOLTAREN) 1 % gel; Apply 2 g to affected area four (4) times daily for 30 days. , Normal, Disp-100 g, R-0 sent to pharmacy. I refilled her Voltaren and encouraged her to continue with her supportive shoes since this change has helped her. 3. Pain of right hand  -     diclofenac (VOLTAREN) 1 % gel; Apply 2 g to affected area four (4) times daily for 30 days. , Normal, Disp-100 g, R-0 sent to pharmacy. I refilled her Voltaren. 4. Hot flashes due to menopause  -     estrogens, conjugated, (PREMARIN) 0.45 mg tablet; Take 1 Tab by mouth daily for 30 days. , Normal, Disp-30 Tab, R-0 sent to pharmacy. Potential side effects were discussed. I prescribed Premarin for her hot flashes. 5. Acquired hypothyroidism  -     METABOLIC PANEL, COMPREHENSIVE; Future  -     TSH 3RD GENERATION; Future  Check TSH.  She is taking levothyroxine 88 mcg every day.     6. Gastroesophageal reflux disease without esophagitis  -     famotidine (PEPCID) 40 mg tablet; Take 1 Tab by mouth daily for 30 days. , Normal, Disp-30 Tab, R-1 sent to pharmacy. I refilled her Pepcid and let her know she can take it every day if she needs. 7. Multiple food allergies  I instructed her to follow up with the allergist and tell them about her sx from eating dairy and spaghetti with red sauce. Explained that since she has figured out the cause of her tongue breaking out, it is important to follow up on it. 8. Vitamin D deficiency  -     VITAMIN D, 25 HYDROXY; Future  Check vitamin D.         SUBJECTIVE/OBJECTIVE:  HPI  Pt presents today to discuss back pain. She works as a  and stands on her feet for long periods of time each day. She has to take time off the past few days d/t the pain, and she also bought new shoes, and this has helped the pain. She has pain in the back of her L upper leg and in her L foot. She is getting pain and stiffness in the 3rd finger of her R hand and has been applying Voltaren gel to it, but the pain is still constant. The pain is worst when she is at work grabbing and moving things. She has been on a low carbohydrate by significantly decreasing pasta, rice, and potatoes. She never drinks soda, only water and coffee. Her main issue is bread, as it is hard to avoid this. She inquires about Pepcid. She had been taking it PRN and inquires if she could take it more frequently. She also benefits from Metamucil and inquires if it is safe to take that frequently. She went to go see Dr. Joseph Raza about her tongue and found out that she has a food allergy. She went to [place] [address] [facility] and found out she has allergies to green beans and black pepper.  She feels that she is allergic to more than just green beans and black pepper, because her tongue still breaks out when she eats ice cream. She also recently broke out after eating spaghetti with red sauce. She notes that she had to do limited testing d/t her insurance coverage. She has not had her follow up appt yet and notes that cost is an issue for further testing. She has been getting hot flashes frequently, especially at night when she is in bed. She stopped having her menstrual period at age 59. She had her pap smear and mammogram done a few years ago and would not like to go back to a gynecologist at this time. She got the first dose of the [address] vaccine on 03/04/21 and will get her second dose on 03/26/21. Patient Active Problem List   Diagnosis Code    Hypothyroidism E03.9    Anemia D64.9    Hyperlipidemia E78.5    Essential hypertension I5    Well controlled diabetes mellitus ([facility] [address] E11.9    Multiple food allergies Z91.018    Gastroesophageal reflux disease without esophagitis K21.9        Current Outpatient Medications on File Prior to Visit   Medication Sig Dispense Refill    pravastatin (PRAVACHOL) 40 mg tablet Take 1 tablet by mouth once daily 90 Tab 0    metFORMIN ER (GLUCOPHAGE XR) 500 mg tablet TAKE ONE TABLET BY MOUTH ONE TIME DAILY WITH DINNER 90 Tab 0    lisinopriL (PRINIVIL, ZESTRIL) 10 mg tablet TAKE ONE TABLET BY MOUTH ONE TIME DAILY 90 Tab 0    levothyroxine (SYNTHROID) 88 mcg tablet TAKE ONE TABLET BY MOUTH EVERY MORNING BEFORE BREAKFAST 90 Tab 1    VITAMIN D3 1,000 unit tablet TAKE ONE TABLET BY MOUTH ONE TIME DAILY 30 Tab 2    Blood-Glucose Meter monitoring kit Please use four times a day and as needed. Dx. E11.9 1 Kit 0    glucose blood VI test strips (BLOOD GLUCOSE TEST) strip Use to test blood sugars 3 times a day and as needed DX. E11.9 100 Strip 6    Lancets misc Use to check blood sugars 3 times daily and as needed 200 Each 11    aspirin delayed-release (ASPIRIN EC) 81 mg tablet Take 81 mg by mouth daily. No current facility-administered medications on file prior to visit.         No Known Allergies    Past Medical History: Diagnosis Date    Anemia     High cholesterol     Thyroid disease     tyroid nodule removed        Past Surgical History:   Procedure Laterality Date    HX HEENT      OH THYROIDECTOMY         Family History   Problem Relation Age of Onset    Diabetes Mother     Hypertension Mother     High Cholesterol Mother     Diabetes Sister     Hypertension Sister     Stroke Sister     Diabetes Brother     Hypertension Brother     Diabetes Maternal Grandmother     Heart Disease Maternal Grandmother        Social History     Socioeconomic History    Marital status: LEGALLY      Spouse name: Not on file    Number of children: Not on file    Years of education: Not on file    Highest education level: Not on file   Occupational History    Not on file   Social Needs    Financial resource strain: Not on file    Food insecurity     Worry: Not on file     Inability: Not on file    Transportation needs     Medical: Not on file     Non-medical: Not on file   Tobacco Use    Smoking status: Former Smoker     Packs/day: 0.50     Years: 30.00     Pack years: 15.00     Types: Cigarettes     Quit date: 3/1/2019     Years since quittin.0    Smokeless tobacco: Never Used   Substance and Sexual Activity    Alcohol use: No    Drug use: No    Sexual activity: Yes   Lifestyle    Physical activity     Days per week: Not on file     Minutes per session: Not on file    Stress: Not on file   Relationships    Social connections     Talks on phone: Not on file     Gets together: Not on file     Attends [employer] service: Not on file     Active member of club or organization: Not on file     Attends meetings of clubs or organizations: Not on file     Relationship status: Not on file    Intimate partner violence     Fear of current or ex partner: Not on file     Emotionally abused: Not on file     Physically abused: Not on file     Forced sexual activity: Not on file   Other Topics Concern    Not on file Social History Narrative    Not on file       No visits with results within 3 Month(s) from this visit. Latest known visit with results is:   Orders Only on 12/22/2020   Component Date Value Ref Range Status    Vitamin D 25-Hydroxy 12/22/2020 23.6* 30 - 100 ng/mL Final    Comment: (NOTE)  Deficiency               <20 ng/mL  Insufficiency          20-30 ng/mL  Sufficient             ng/mL  Possible toxicity       >100 ng/mL    The Method used is Siemens Advia Centaur currently standardized to a   Center of Disease Control and Prevention (CDC) certified reference   [address]. Samples containing fluorescein dye can produce falsely   elevated values when tested with the ADVIA Centaur Vitamin D Assay. It is recommended that results in the toxic range, >100 ng/mL, be   retested 72 hours post fluorescein exposure.  TSH 12/22/2020 0.28* 0.36 - 3.74 uIU/mL Final    Comment:   Due to TSH heterogeneity, both structurally and degree of glycosylation,  monoclonal antibodies used in the TSH assay may not accurately quantitate TSH. Therefore, this result should be correlated with clinical findings as well as  with other assessments of thyroid function, e.g., free T4, free T3.      Hemoglobin A1c 12/22/2020 6.2* 4.0 - 5.6 % Final    Comment: NEW METHOD PLEASE NOTE NEW REFERENCE RANGE  (NOTE)  HbA1C Interpretive Ranges  <5.7              Normal  5.7 - 6.4         Consider Prediabetes  >6.5              Consider Diabetes      Est. average glucose 12/22/2020 131  mg/dL Final    LIPID PROFILE 12/22/2020        Final    Cholesterol, total 12/22/2020 163  <200 MG/DL Final    Triglyceride 12/22/2020 70  <150 MG/DL Final    Comment: Based on NCEP-ATP III:  Triglycerides <150 mg/dL  is considered normal, 150-199  mg/dL  borderline high,  200-499 mg/dL high and  greater than or equal to 500  mg/dL very high.       HDL Cholesterol 12/22/2020 84  MG/DL Final    Comment: Based on NCEP ATP III, HDL Cholesterol <40 mg/dL is considered low and >60  mg/dL is elevated.  LDL, calculated 12/22/2020 65  0 - 100 MG/DL Final    Comment: Based on the NCEP-ATP: LDL-C concentrations are considered  optimal <100 mg/dL,  near optimal/above Normal 100-129 mg/dL Borderline High: 130-159, High: 160-189  mg/dL Very High: Greater than or equal to 190 mg/dL      VLDL, calculated 12/22/2020 14  MG/DL Final    CHOL/HDL Ratio 12/22/2020 1.9  0.0 - 5.0   Final    WBC 12/22/2020 4.3  3.6 - 11.0 K/uL Final    RBC 12/22/2020 4.33  3.80 - 5.20 M/uL Final    HGB 12/22/2020 13.0  11.5 - 16.0 g/dL Final    HCT 12/22/2020 40.8  35.0 - 47.0 % Final    MCV 12/22/2020 94.2  80.0 - 99.0 FL Final    MCH 12/22/2020 30.0  26.0 - 34.0 PG Final    MCHC 12/22/2020 31.9  30.0 - 36.5 g/dL Final    RDW 12/22/2020 12.9  11.5 - 14.5 % Final    PLATELET 78/39/1843 600  150 - 400 K/uL Final    MPV 12/22/2020 10.6  8.9 - 12.9 FL Final    NRBC 12/22/2020 0.0  0  WBC Final    ABSOLUTE NRBC 12/22/2020 0.00  0.00 - 0.01 K/uL Final    Sodium 12/22/2020 136  136 - 145 mmol/L Final    Potassium 12/22/2020 4.3  3.5 - 5.1 mmol/L Final    Chloride 12/22/2020 104  97 - 108 mmol/L Final    CO2 12/22/2020 27  21 - 32 mmol/L Final    Anion gap 12/22/2020 5  5 - 15 mmol/L Final    Glucose 12/22/2020 88  65 - 100 mg/dL Final    BUN 12/22/2020 12  6 - 20 MG/DL Final    Creatinine 12/22/2020 0.74  0.55 - 1.02 MG/DL Final    BUN/Creatinine ratio 12/22/2020 16  12 - 20   Final    GFR est AA 12/22/2020 >60  >60 ml/min/1.73m2 Final    GFR est non-AA 12/22/2020 >60  >60 ml/min/1.73m2 Final    Comment: Estimated GFR is calculated using the IDMS-traceable Modification of Diet in  Renal Disease (MDRD) Study equation, reported for both  Americans  (GFRAA) and non- Americans (GFRNA), and normalized to 1.73m2 body  surface area. The physician must decide which value applies to the patient.       Calcium 12/22/2020 9.6  8.5 - 10.1 MG/DL Final    Bilirubin, total 12/22/2020 0.6  0.2 - 1.0 MG/DL Final    ALT (SGPT) 12/22/2020 22  12 - 78 U/L Final    AST (SGOT) 12/22/2020 13* 15 - 37 U/L Final    Alk. phosphatase 12/22/2020 86  45 - 117 U/L Final    Protein, total 12/22/2020 7.5  6.4 - 8.2 g/dL Final    Albumin 12/22/2020 4.3  3.5 - 5.0 g/dL Final    Globulin 12/22/2020 3.2  2.0 - 4.0 g/dL Final    A-G Ratio 12/22/2020 1.3  1.1 - 2.2   Final     Review of Systems   Constitutional: Positive for diaphoresis (hot flashes). Negative for activity change, fatigue and unexpected weight change. HENT: Negative for congestion, hearing loss, rhinorrhea and sore throat. Eyes: Negative for discharge. Respiratory: Negative for cough, chest tightness and shortness of breath. Cardiovascular: Negative for leg swelling. Gastrointestinal: Negative for abdominal pain, constipation and diarrhea. Genitourinary: Negative for dysuria, flank pain, frequency and urgency. Musculoskeletal: Positive for arthralgias (R hand), back pain and myalgias (L upper leg). +L foot pain   Skin: Negative for color change and rash. Neurological: Negative for dizziness, light-headedness and headaches. Psychiatric/Behavioral: Negative for dysphoric mood and sleep disturbance. The patient is not nervous/anxious. Visit Vitals  /80 (BP 1 Location: Left upper arm, BP Patient Position: Sitting, BP Cuff Size: Adult)   Pulse 82   Temp 97.5 °F (36.4 °C) (Temporal)   Resp 16   Ht 5' (1.524 m)   Wt 164 lb (74.4 kg)   LMP 12/04/2012   SpO2 98%   BMI 32.03 kg/m²     Physical Exam  Vitals signs and nursing note reviewed. Constitutional:       General: She is not in acute distress. Appearance: Normal appearance. She is well-developed. She is not diaphoretic. HENT:      Right Ear: External ear normal.      Left Ear: External ear normal.   Eyes:      General: No scleral icterus. Right eye: No discharge. Left eye: No discharge.       Extraocular Movements: Extraocular movements intact. Conjunctiva/sclera: Conjunctivae normal.   Neck:      Musculoskeletal: Normal range of motion and neck supple. Cardiovascular:      Rate and Rhythm: Normal rate and regular rhythm. Pulmonary:      Effort: Pulmonary effort is normal.      Breath sounds: Normal breath sounds. No wheezing. Abdominal:      General: Bowel sounds are normal.      Palpations: Abdomen is soft. Tenderness: There is no abdominal tenderness. Lymphadenopathy:      Cervical: No cervical adenopathy. Neurological:      Mental Status: She is alert and oriented to person, place, and time. Psychiatric:         Mood and Affect: Mood and affect normal.         An electronic signature was used to authenticate this note.   -- Alicia Nicholson

## 2021-03-22 NOTE — PROGRESS NOTES
Chief Complaint   Patient presents with    Back Pain     has issues with back pain and down the leg .  stands on her feet all the time. states that she is having digestive problem but did not go back to allergy doctor she cant afford problem with black pepper and green beans and tongue has not healed.  Other     hot flashes and she is getting them very bad. does not have obgyn doctor.   doesnt want one     Foot Pain     left foot, bought new shoes she does have a podiatrist on the left foot the top of it.  hurts at the ball of the foot too

## 2021-03-23 LAB
25(OH)D3 SERPL-MCNC: 33.2 NG/ML (ref 30–100)
ALBUMIN SERPL-MCNC: 4.4 G/DL (ref 3.5–5)
ALBUMIN/GLOB SERPL: 1.2 {RATIO} (ref 1.1–2.2)
ALP SERPL-CCNC: 89 U/L (ref 45–117)
ALT SERPL-CCNC: 25 U/L (ref 12–78)
ANION GAP SERPL CALC-SCNC: 4 MMOL/L (ref 5–15)
AST SERPL-CCNC: 17 U/L (ref 15–37)
BILIRUB SERPL-MCNC: 0.6 MG/DL (ref 0.2–1)
BUN SERPL-MCNC: 17 MG/DL (ref 6–20)
BUN/CREAT SERPL: 21 (ref 12–20)
CALCIUM SERPL-MCNC: 9.6 MG/DL (ref 8.5–10.1)
CHLORIDE SERPL-SCNC: 105 MMOL/L (ref 97–108)
CO2 SERPL-SCNC: 28 MMOL/L (ref 21–32)
CREAT SERPL-MCNC: 0.8 MG/DL (ref 0.55–1.02)
GLOBULIN SER CALC-MCNC: 3.6 G/DL (ref 2–4)
GLUCOSE SERPL-MCNC: 95 MG/DL (ref 65–100)
POTASSIUM SERPL-SCNC: 5.2 MMOL/L (ref 3.5–5.1)
PROT SERPL-MCNC: 8 G/DL (ref 6.4–8.2)
SODIUM SERPL-SCNC: 137 MMOL/L (ref 136–145)
TSH SERPL DL<=0.05 MIU/L-ACNC: 1.37 UIU/ML (ref 0.36–3.74)

## 2021-03-27 NOTE — PROGRESS NOTES
Jean, hope you are doing well. Your blood report is back and I am happy to see Vitamin D & TSH ( thyroid) number came back in normal range. Continue same dose synthroid. Let me know if you need refill.

## 2021-05-02 DIAGNOSIS — I10 HYPERTENSION, UNSPECIFIED TYPE: ICD-10-CM

## 2021-05-02 DIAGNOSIS — E11.9 WELL CONTROLLED DIABETES MELLITUS (HCC): ICD-10-CM

## 2021-05-02 RX ORDER — METFORMIN HYDROCHLORIDE 500 MG/1
TABLET, EXTENDED RELEASE ORAL
Qty: 90 TAB | Refills: 0 | Status: SHIPPED | OUTPATIENT
Start: 2021-05-02 | End: 2021-09-13

## 2021-05-02 RX ORDER — LISINOPRIL 10 MG/1
TABLET ORAL
Qty: 90 TAB | Refills: 0 | Status: SHIPPED | OUTPATIENT
Start: 2021-05-02 | End: 2021-08-02

## 2021-06-14 DIAGNOSIS — E78.00 HYPERCHOLESTEROLEMIA: ICD-10-CM

## 2021-06-14 RX ORDER — PRAVASTATIN SODIUM 40 MG/1
TABLET ORAL
Qty: 90 TABLET | Refills: 0 | Status: SHIPPED | OUTPATIENT
Start: 2021-06-14 | End: 2021-09-13

## 2021-08-02 DIAGNOSIS — I10 HYPERTENSION, UNSPECIFIED TYPE: ICD-10-CM

## 2021-08-02 RX ORDER — LISINOPRIL 10 MG/1
TABLET ORAL
Qty: 90 TABLET | Refills: 0 | Status: SHIPPED | OUTPATIENT
Start: 2021-08-02 | End: 2021-11-19 | Stop reason: SDUPTHER

## 2021-09-13 DIAGNOSIS — E78.00 HYPERCHOLESTEROLEMIA: ICD-10-CM

## 2021-09-13 DIAGNOSIS — E11.9 WELL CONTROLLED DIABETES MELLITUS (HCC): ICD-10-CM

## 2021-09-13 RX ORDER — PRAVASTATIN SODIUM 40 MG/1
TABLET ORAL
Qty: 90 TABLET | Refills: 0 | Status: SHIPPED | OUTPATIENT
Start: 2021-09-13 | End: 2021-11-19 | Stop reason: SDUPTHER

## 2021-09-13 RX ORDER — METFORMIN HYDROCHLORIDE 500 MG/1
TABLET, EXTENDED RELEASE ORAL
Qty: 90 TABLET | Refills: 0 | Status: SHIPPED | OUTPATIENT
Start: 2021-09-13 | End: 2021-11-19 | Stop reason: SDUPTHER

## 2021-09-29 ENCOUNTER — OFFICE VISIT (OUTPATIENT)
Dept: PRIMARY CARE CLINIC | Age: 66
End: 2021-09-29
Payer: COMMERCIAL

## 2021-09-29 VITALS
OXYGEN SATURATION: 100 % | HEART RATE: 78 BPM | HEIGHT: 60 IN | WEIGHT: 165.6 LBS | RESPIRATION RATE: 15 BRPM | DIASTOLIC BLOOD PRESSURE: 89 MMHG | BODY MASS INDEX: 32.51 KG/M2 | SYSTOLIC BLOOD PRESSURE: 134 MMHG | TEMPERATURE: 97.8 F

## 2021-09-29 DIAGNOSIS — E03.9 ACQUIRED HYPOTHYROIDISM: ICD-10-CM

## 2021-09-29 DIAGNOSIS — Z12.4 CERVICAL CANCER SCREENING: ICD-10-CM

## 2021-09-29 DIAGNOSIS — I10 ESSENTIAL HYPERTENSION: ICD-10-CM

## 2021-09-29 DIAGNOSIS — M81.0 POSTMENOPAUSAL BONE LOSS: ICD-10-CM

## 2021-09-29 DIAGNOSIS — E11.9 WELL CONTROLLED DIABETES MELLITUS (HCC): Primary | ICD-10-CM

## 2021-09-29 DIAGNOSIS — E78.2 MIXED HYPERLIPIDEMIA: ICD-10-CM

## 2021-09-29 DIAGNOSIS — Z23 NEED FOR VACCINATION AGAINST STREPTOCOCCUS PNEUMONIAE: ICD-10-CM

## 2021-09-29 PROCEDURE — 90732 PPSV23 VACC 2 YRS+ SUBQ/IM: CPT | Performed by: INTERNAL MEDICINE

## 2021-09-29 PROCEDURE — 90471 IMMUNIZATION ADMIN: CPT | Performed by: INTERNAL MEDICINE

## 2021-09-29 PROCEDURE — 99214 OFFICE O/P EST MOD 30 MIN: CPT | Performed by: INTERNAL MEDICINE

## 2021-09-29 NOTE — PROGRESS NOTES
Patient provided with most updated VIS prior to administration. Opportunity given for questions and concerns provided. No concerns at this time    Immunizations administered to patient 9/29/2021 by Tiffany Beltran LPN with consent. Patient tolerated procedure well. No reactions noted.

## 2021-09-29 NOTE — PROGRESS NOTES
Chief Complaint   Patient presents with    Follow-up     needing lab work done, weight gain, bloating       Visit Vitals  /89 (BP 1 Location: Right arm)   Pulse 78   Temp 97.8 °F (36.6 °C)   Resp 15   Ht 5' (1.524 m)   Wt 165 lb 9.6 oz (75.1 kg)   LMP 12/04/2012   SpO2 100%   BMI 32.34 kg/m²       1. Have you been to the ER, urgent care clinic since your last visit? Hospitalized since your last visit? No    2. Have you seen or consulted any other health care providers outside of the [address] [place] [address] since your last visit? Include any pap smears or colon screening.  Yes Eye doctor

## 2021-09-29 NOTE — PROGRESS NOTES
Rosi Santos (: 1955) is a 73 y.o. female, established patient, here for evaluation of the following chief complaint(s):  Follow-up (needing lab work done, weight gain, bloating)     Written by Jerardo Archer, as dictated by Dr. Chalo Del Real MD.      ASSESSMENT/PLAN:  Below is the assessment and plan developed based on review of pertinent history, physical exam, labs, studies, and medications. 1. Well controlled diabetes mellitus ([address]  Continue taking metformin 500 mg as prescribed. Ordered labs to check metabolic panel, CBC levels, and A1c level. Waiting for results. -     METABOLIC PANEL, COMPREHENSIVE; Future  -     CBC W/O DIFF; Future  -     HEMOGLOBIN A1C WITH EAG; Future    2. Essential hypertension  Well controlled on current medication. Continue taking lisinopril 10 mg as prescribed. 3. Acquired hypothyroidism  Continue taking Synthroid 88 mcg as prescribed. Ordered labs to check TSH level. Waiting for results.  -     TSH 3RD GENERATION; Future    4. Postmenopausal bone loss  Ordered a DEXA scan to be completed. Waiting for results. -     DEXA BONE DENSITY STUDY AXIAL; Future    5. Mixed hyperlipidemia  Continue taking pravastatin 40 mg as prescribed. Ordered labs to check lipid panel. Waiting for results. -     LIPID PANEL; Future    6. Need for vaccination against Streptococcus pneumoniae  Administered a Pneumococcal vaccine-23 in the office today. She tolerated the vaccine well. -     PNEUMOCOCCAL POLYSACCHARIDE VACCINE, 23-VALENT, ADULT OR IMMUNOSUPPRESSED PT DOSE,    7. Cervical cancer screening  Referred to OBGYN.   -     REFERRAL TO OBSTETRICS AND GYNECOLOGY    SUBJECTIVE/OBJECTIVE:  HPI     Patient presents today for a follow up visit. She notes she is interested in losing weight. Her weight has changed from 156 lb on 2020 to 165 lbs today. She c/o abdominal bloating. Her last Pap smear was in 2018 and she has not seen an OBGYN since.      She completed a colonoscopy last year and results were unremarkable. She was told to return every 5 years. She takes lisinopril 10 mg for HTN. Her BP today is 134/89. She takes metformin 500 mg for diabetes. She takes pravastatin 40 mg for hypercholesterolemia. She takes Synthroid 88 mcg for hypothyroidism. Patient Active Problem List   Diagnosis Code    Hypothyroidism E03.9    Anemia D64.9    Hyperlipidemia E78.5    Essential hypertension I5    Well controlled diabetes mellitus ([facility] [address] E11.9    Multiple food allergies Z91.018    Gastroesophageal reflux disease without esophagitis K21.9        Current Outpatient Medications on File Prior to Visit   Medication Sig Dispense Refill    metFORMIN ER (GLUCOPHAGE XR) 500 mg tablet TAKE ONE TABLET BY MOUTH ONE TIME DAILY WITH DINNER 90 Tablet 0    pravastatin (PRAVACHOL) 40 mg tablet TAKE ONE TABLET BY MOUTH ONE TIME DAILY 90 Tablet 0    lisinopriL (PRINIVIL, ZESTRIL) 10 mg tablet TAKE ONE TABLET BY MOUTH ONE TIME DAILY 90 Tablet 0    levothyroxine (SYNTHROID) 88 mcg tablet TAKE ONE TABLET BY MOUTH EVERY MORNING BEFORE BREAKFAST 90 Tab 1    VITAMIN D3 1,000 unit tablet TAKE ONE TABLET BY MOUTH ONE TIME DAILY 30 Tab 2    Blood-Glucose Meter monitoring kit Please use four times a day and as needed. Dx. E11.9 1 Kit 0    glucose blood VI test strips (BLOOD GLUCOSE TEST) strip Use to test blood sugars 3 times a day and as needed DX. E11.9 100 Strip 6    Lancets misc Use to check blood sugars 3 times daily and as needed 200 Each 11    aspirin delayed-release (ASPIRIN EC) 81 mg tablet Take 81 mg by mouth daily. No current facility-administered medications on file prior to visit.        No Known Allergies    Past Medical History:   Diagnosis Date    Anemia     High cholesterol     Thyroid disease     tyroid nodule removed 1998       Past Surgical History:   Procedure Laterality Date    HX HEENT      GA THYROIDECTOMY         Family History   Problem Relation Age of Onset    Diabetes Mother     Hypertension Mother     High Cholesterol Mother     Diabetes Sister     Hypertension Sister     Stroke Sister     Diabetes Brother     Hypertension Brother     Diabetes Maternal Grandmother     Heart Disease Maternal Grandmother        Social History     Socioeconomic History    Marital status: LEGALLY      Spouse name: Not on file    Number of children: Not on file    Years of education: Not on file    Highest education level: Not on file   Occupational History    Not on file   Tobacco Use    Smoking status: Former Smoker     Packs/day: 0.50     Years: 30.00     Pack years: 15.00     Types: Cigarettes     Quit date: 3/1/2019     Years since quittin.5    Smokeless tobacco: Never Used   Vaping Use    Vaping Use: Never used   Substance and Sexual Activity    Alcohol use: No    Drug use: No    Sexual activity: Yes   Other Topics Concern    Not on file   Social History Narrative    Not on file     Social Determinants of Health     Financial Resource Strain:     Difficulty of Paying Living Expenses:    Food Insecurity:     Worried About Running Out of Food in the Last Year:     Ran Out of Food in the Last Year:    Transportation Needs:     Lack of Transportation (Medical):  Lack of Transportation (Non-Medical):    Physical Activity:     Days of Exercise per Week:     Minutes of Exercise per Session:    Stress:     Feeling of Stress :    Social Connections:     Frequency of Communication with Friends and Family:     Frequency of Social Gatherings with Friends and Family:     Attends [employer] Services:     Active Member of Clubs or Organizations:     Attends Club or Organization Meetings:     Marital Status:    Intimate Partner Violence:     Fear of Current or Ex-Partner:     Emotionally Abused:     Physically Abused:     Sexually Abused:        No visits with results within 3 Month(s) from this visit.    Latest known visit with results is:   Office Visit on 03/22/2021   Component Date Value Ref Range Status    Vitamin D 25-Hydroxy 03/22/2021 33.2  30 - 100 ng/mL Final    Comment: (NOTE)  Deficiency               <20 ng/mL  Insufficiency          20-30 ng/mL  Sufficient             ng/mL  Possible toxicity       >100 ng/mL    The Method used is Siemens Advia Centaur currently standardized to a   Center of Disease Control and Prevention (CDC) certified reference   [address]. Samples containing fluorescein dye can produce falsely   elevated values when tested with the ADVIA Centaur Vitamin D Assay. It is recommended that results in the toxic range, >100 ng/mL, be   retested 72 hours post fluorescein exposure.  TSH 03/22/2021 1.37  0.36 - 3.74 uIU/mL Final    Comment:   Due to TSH heterogeneity, both structurally and degree of glycosylation,  monoclonal antibodies used in the TSH assay may not accurately quantitate TSH. Therefore, this result should be correlated with clinical findings as well as  with other assessments of thyroid function, e.g., free T4, free T3.  Sodium 03/22/2021 137  136 - 145 mmol/L Final    Potassium 03/22/2021 5.2* 3.5 - 5.1 mmol/L Final    Chloride 03/22/2021 105  97 - 108 mmol/L Final    CO2 03/22/2021 28  21 - 32 mmol/L Final    Anion gap 03/22/2021 4* 5 - 15 mmol/L Final    Glucose 03/22/2021 95  65 - 100 mg/dL Final    BUN 03/22/2021 17  6 - 20 MG/DL Final    Creatinine 03/22/2021 0.80  0.55 - 1.02 MG/DL Final    BUN/Creatinine ratio 03/22/2021 21* 12 - 20   Final    GFR est AA 03/22/2021 >60  >60 ml/min/1.73m2 Final    GFR est non-AA 03/22/2021 >60  >60 ml/min/1.73m2 Final    Comment: Estimated GFR is calculated using the IDMS-traceable Modification of Diet in  Renal Disease (MDRD) Study equation, reported for both  Americans  (GFRAA) and non- Americans (GFRNA), and normalized to 1.73m2 body  surface area.  The physician must decide which value applies to the patient.  Calcium 03/22/2021 9.6  8.5 - 10.1 MG/DL Final    Bilirubin, total 03/22/2021 0.6  0.2 - 1.0 MG/DL Final    ALT (SGPT) 03/22/2021 25  12 - 78 U/L Final    AST (SGOT) 03/22/2021 17  15 - 37 U/L Final    Alk. phosphatase 03/22/2021 89  45 - 117 U/L Final    Protein, total 03/22/2021 8.0  6.4 - 8.2 g/dL Final    Albumin 03/22/2021 4.4  3.5 - 5.0 g/dL Final    Globulin 03/22/2021 3.6  2.0 - 4.0 g/dL Final    A-G Ratio 03/22/2021 1.2  1.1 - 2.2   Final       Review of Systems   Constitutional: Negative for activity change, fatigue and unexpected weight change. HENT: Negative for congestion, hearing loss, rhinorrhea and sore throat. Eyes: Negative for discharge. Respiratory: Negative for cough, chest tightness and shortness of breath. Cardiovascular: Negative for leg swelling. Gastrointestinal: Positive for abdominal distention. Negative for abdominal pain, constipation and diarrhea. Genitourinary: Negative for dysuria, flank pain, frequency and urgency. Musculoskeletal: Negative for arthralgias, back pain and myalgias. Skin: Negative for color change and rash. Neurological: Negative for dizziness, light-headedness and headaches. Psychiatric/Behavioral: Negative for dysphoric mood and sleep disturbance. The patient is not nervous/anxious. Visit Vitals  /89 (BP 1 Location: Right arm)   Pulse 78   Temp 97.8 °F (36.6 °C)   Resp 15   Ht 5' (1.524 m)   Wt 165 lb 9.6 oz (75.1 kg)   LMP 12/04/2012   SpO2 100%   BMI 32.34 kg/m²       Physical Exam  Vitals and nursing note reviewed. Constitutional:       General: She is not in acute distress. Appearance: Normal appearance. She is well-developed. She is not diaphoretic. HENT:      Right Ear: External ear normal.      Left Ear: External ear normal.   Eyes:      General: No scleral icterus. Right eye: No discharge. Left eye: No discharge. Extraocular Movements: Extraocular movements intact. Conjunctiva/sclera: Conjunctivae normal.   Cardiovascular:      Rate and Rhythm: Normal rate and regular rhythm. Pulmonary:      Effort: Pulmonary effort is normal.      Breath sounds: Normal breath sounds. No wheezing. Abdominal:      General: Bowel sounds are normal.      Palpations: Abdomen is soft. Tenderness: There is no abdominal tenderness. Musculoskeletal:      Cervical back: Normal range of motion and neck supple. Lymphadenopathy:      Cervical: No cervical adenopathy. Neurological:      Mental Status: She is alert and oriented to person, place, and time. Psychiatric:         Mood and Affect: Mood and affect normal.           An electronic signature was used to authenticate this note.   -- Clem Moreno

## 2021-10-03 DIAGNOSIS — E03.9 ACQUIRED HYPOTHYROIDISM: ICD-10-CM

## 2021-10-03 RX ORDER — LEVOTHYROXINE SODIUM 88 UG/1
88 TABLET ORAL
Qty: 90 TABLET | Refills: 1 | Status: SHIPPED | OUTPATIENT
Start: 2021-10-03 | End: 2022-10-06

## 2021-10-20 ENCOUNTER — HOSPITAL ENCOUNTER (OUTPATIENT)
Dept: MAMMOGRAPHY | Age: 66
Discharge: HOME | End: 2021-10-20
Attending: INTERNAL MEDICINE
Payer: COMMERCIAL

## 2021-10-20 DIAGNOSIS — M81.0 POSTMENOPAUSAL BONE LOSS: ICD-10-CM

## 2021-10-20 PROCEDURE — 77080 DXA BONE DENSITY AXIAL: CPT

## 2021-10-21 NOTE — PROGRESS NOTES
Results reviewed. Release via [employer],  Your Bone density scan is back and showed osteopenia. I would suggest weight lifting exercises , vitamin D 1000 I.U daily dose and calcium 500 mg 3 times a week.

## 2021-11-18 ENCOUNTER — PATIENT MESSAGE (OUTPATIENT)
Dept: PRIMARY CARE CLINIC | Age: 66
End: 2021-11-18

## 2021-11-18 DIAGNOSIS — I10 HYPERTENSION, UNSPECIFIED TYPE: ICD-10-CM

## 2021-11-18 DIAGNOSIS — E78.00 HYPERCHOLESTEROLEMIA: ICD-10-CM

## 2021-11-18 DIAGNOSIS — E11.9 WELL CONTROLLED DIABETES MELLITUS (HCC): ICD-10-CM

## 2021-11-19 DIAGNOSIS — I10 HYPERTENSION, UNSPECIFIED TYPE: ICD-10-CM

## 2021-11-19 DIAGNOSIS — E78.00 HYPERCHOLESTEROLEMIA: ICD-10-CM

## 2021-11-19 DIAGNOSIS — E11.9 WELL CONTROLLED DIABETES MELLITUS (HCC): ICD-10-CM

## 2021-11-19 RX ORDER — PRAVASTATIN SODIUM 40 MG/1
40 TABLET ORAL DAILY
Qty: 90 TABLET | Refills: 0 | Status: SHIPPED | OUTPATIENT
Start: 2021-11-19 | End: 2022-04-27

## 2021-11-19 RX ORDER — LISINOPRIL 10 MG/1
10 TABLET ORAL DAILY
Qty: 90 TABLET | Refills: 0 | Status: SHIPPED | OUTPATIENT
Start: 2021-11-19 | End: 2021-11-19 | Stop reason: SDUPTHER

## 2021-11-19 RX ORDER — LISINOPRIL 10 MG/1
10 TABLET ORAL DAILY
Qty: 90 TABLET | Refills: 0 | Status: SHIPPED | OUTPATIENT
Start: 2021-11-19 | End: 2022-04-27

## 2021-11-19 RX ORDER — PRAVASTATIN SODIUM 40 MG/1
40 TABLET ORAL DAILY
Qty: 90 TABLET | Refills: 0 | Status: SHIPPED | OUTPATIENT
Start: 2021-11-19 | End: 2021-11-19 | Stop reason: SDUPTHER

## 2021-11-19 RX ORDER — METFORMIN HYDROCHLORIDE 500 MG/1
TABLET, EXTENDED RELEASE ORAL
Qty: 90 TABLET | Refills: 0 | Status: SHIPPED | OUTPATIENT
Start: 2021-11-19 | End: 2022-04-27

## 2021-11-19 RX ORDER — METFORMIN HYDROCHLORIDE 500 MG/1
TABLET, EXTENDED RELEASE ORAL
Qty: 90 TABLET | Refills: 0 | Status: SHIPPED | OUTPATIENT
Start: 2021-11-19 | End: 2021-11-19 | Stop reason: SDUPTHER

## 2021-11-19 NOTE — TELEPHONE ENCOUNTER
Called patient and left a VM, needing clarification regarding the MyChart message. Needing refills sent now or in the future?

## 2021-11-19 NOTE — TELEPHONE ENCOUNTER
Requested Prescriptions     Pending Prescriptions Disp Refills    pravastatin (PRAVACHOL) 40 mg tablet 90 Tablet 0     Sig: Take 1 Tablet by mouth daily.  metFORMIN ER (GLUCOPHAGE XR) 500 mg tablet 90 Tablet 0     Sig: TAKE ONE TABLET BY MOUTH ONE TIME DAILY WITH DINNER    lisinopriL (PRINIVIL, ZESTRIL) 10 mg tablet 90 Tablet 0     Sig: Take 1 Tablet by mouth daily.         Last Visit 9/29/21  Last Refill   Lisinopril 8/2/21  Metformin ER, Pravastatin 9/13/21

## 2021-12-09 ENCOUNTER — TELEPHONE (OUTPATIENT)
Dept: PRIMARY CARE CLINIC | Age: 66
End: 2021-12-09

## 2021-12-09 ENCOUNTER — TRANSCRIBE ORDER (OUTPATIENT)
Dept: SCHEDULING | Age: 66
End: 2021-12-09

## 2021-12-09 DIAGNOSIS — Z12.31 VISIT FOR SCREENING MAMMOGRAM: Primary | ICD-10-CM

## 2021-12-09 NOTE — TELEPHONE ENCOUNTER
----- Message from Thelda Cladaysi sent at 12/8/2021 11:40 AM EST -----  Subject: Appointment Request    Reason for Call: Semi-Routine Eye Problem    QUESTIONS  Type of Appointment? Established Patient  Reason for appointment request? Available appointments did not meet   patient need  Additional Information for Provider? The patient is calling to request an   Urgent appointment with Dr. Navarro Arrington. The patient is experiencing symptoms of   Pink Eye. The patient wishes to only see Dr. Navarro Arrington. Today after 4pm if   possible? Please call the patient  ---------------------------------------------------------------------------  --------------  CALL BACK INFO  What is the best way for the office to contact you? OK to leave message on   voicemail  Preferred Call Back Phone Number? [phone]  ---------------------------------------------------------------------------  --------------  SCRIPT ANSWERS  Relationship to Patient? Self  Have you had an injury or trauma? No  Have you had sudden loss of vision? No  Are you having eye pain? No  Are you having a headache? No  Have you recently (14 days) seen a provider for this issue? No  Have you been diagnosed with, awaiting test results for, or told that you   are suspected of having COVID-19 (Coronavirus)? (If patient has tested   negative or was tested as a requirement for work, school, or travel and   not based on symptoms, answer no)? No  Within the past two weeks have you developed any of the following symptoms   (answer no if symptoms have been present longer than 2 weeks or began   more than 2 weeks ago)? Fever or Chills, Cough, Shortness of breath or   difficulty breathing, Loss of taste or smell, Sore throat, Nasal   congestion, Sneezing or runny nose, Fatigue or generalized body aches   (answer no if pain is specific to a body part e.g. back pain), Diarrhea,   Headache? No  Have you had close contact with someone with COVID-19 in the last 14 days?    No  (Service Expert  click yes below to proceed with [employer]ual   Scheduling)?  Yes

## 2021-12-09 NOTE — TELEPHONE ENCOUNTER
Called and left a VM, recommending patient go to an Urgent Care due to no availability until next week.

## 2022-01-20 ENCOUNTER — TRANSCRIBE ORDER (OUTPATIENT)
Dept: SCHEDULING | Age: 67
End: 2022-01-20

## 2022-01-20 DIAGNOSIS — Z12.31 SCREENING MAMMOGRAM FOR HIGH-RISK PATIENT: Primary | ICD-10-CM

## 2022-01-27 ENCOUNTER — HOSPITAL ENCOUNTER (OUTPATIENT)
Dept: MAMMOGRAPHY | Age: 67
Discharge: HOME | End: 2022-01-27
Attending: INTERNAL MEDICINE
Payer: COMMERCIAL

## 2022-01-27 DIAGNOSIS — Z12.31 SCREENING MAMMOGRAM FOR HIGH-RISK PATIENT: ICD-10-CM

## 2022-01-27 PROCEDURE — 77063 BREAST TOMOSYNTHESIS BI: CPT

## 2022-02-22 ENCOUNTER — NURSE TRIAGE (OUTPATIENT)
Dept: OTHER | Facility: CLINIC | Age: 67
End: 2022-02-22

## 2022-02-22 ENCOUNTER — OFFICE VISIT (OUTPATIENT)
Dept: PRIMARY CARE CLINIC | Age: 67
End: 2022-02-22
Payer: COMMERCIAL

## 2022-02-22 ENCOUNTER — TELEPHONE (OUTPATIENT)
Dept: PRIMARY CARE CLINIC | Age: 67
End: 2022-02-22

## 2022-02-22 VITALS
DIASTOLIC BLOOD PRESSURE: 77 MMHG | RESPIRATION RATE: 15 BRPM | HEIGHT: 60 IN | HEART RATE: 109 BPM | OXYGEN SATURATION: 98 % | TEMPERATURE: 97.3 F | WEIGHT: 158.4 LBS | BODY MASS INDEX: 31.1 KG/M2 | SYSTOLIC BLOOD PRESSURE: 110 MMHG

## 2022-02-22 DIAGNOSIS — K64.8 INTERNAL HEMORRHOID: ICD-10-CM

## 2022-02-22 DIAGNOSIS — E11.9 WELL CONTROLLED DIABETES MELLITUS (HCC): ICD-10-CM

## 2022-02-22 DIAGNOSIS — M77.50 TENDONITIS OF FOOT: ICD-10-CM

## 2022-02-22 DIAGNOSIS — R25.2 LEG CRAMPS: ICD-10-CM

## 2022-02-22 DIAGNOSIS — K92.1 BLOOD IN THE STOOL: Primary | ICD-10-CM

## 2022-02-22 DIAGNOSIS — E03.9 ACQUIRED HYPOTHYROIDISM: ICD-10-CM

## 2022-02-22 PROCEDURE — 99214 OFFICE O/P EST MOD 30 MIN: CPT | Performed by: INTERNAL MEDICINE

## 2022-02-22 RX ORDER — HYDROCORTISONE 25 MG/G
CREAM TOPICAL 3 TIMES DAILY
Qty: 28 G | Refills: 0 | Status: SHIPPED | OUTPATIENT
Start: 2022-02-22 | End: 2022-03-04

## 2022-02-22 NOTE — TELEPHONE ENCOUNTER
Received call from [facility] at [facility] with Red Flag Complaint. Subjective: Caller states \"1 week ago it started with blood in my stool. It wasn't a lot. Today, it really started and there was a lot. Normally, when I clean myself and it goes away. I take aspirin 81 mg once a day for the last two to three weeks. Prior to that, I was taking 3 each time - 6-9 pills a day. I have a lot of arthritis pain - I took a cortisone shot for a trigger finger. I  for [employer]. I do feel weak sometimes at work. I go to bathroom regularly every morning - sometimes I have to strain a little bit. This morning I took some Tylenol arthritis pills. I normally on use the bathroom once a day to have a bowel movement - the bleeding has stopped now. I had colonoscopy in 2019 - diagnosed with diverticulosis. \"     Current Symptoms:   Blood in stool (1 episode today and 1 episode last week) and scant rectal bleeding - now resolved - pt does not take any stool softeners, reports \"slight\" constipation   Nausea without vomiting - denies abdominal pain   Mild weakness - mostly when working at [employer]   Pallor - no worse than normal - pt has history of anemia    Denies urinary changes, shortness of breath, dizziness, light-headedness    Onset: 1 episode today    Associated Symptoms: NA    Pain Severity: Denies pain     Temperature: Denies fever     What has been tried: NA    LMP: NA Pregnant: NA    Recommended disposition: SEE PCP WITHIN 24 HOURS: If no appointments available, go to [facility]. Pt refusing disposition reporting this is no feasible due to her work scheduled - advised she should be seen today - 2nd level triage to [facility], from [facility] Zandra Corcoran  for further instruction from provider    Care advice provided, patient verbalizes understanding; denies any other questions or concerns; instructed to call back for any new or worsening symptoms.     Writer provided warm transfer to [facility],  at [facility] Zandra Flor for further instruction from provider. Attention Provider: Thank you for allowing me to participate in the care of your patient. The patient was connected to triage in response to information provided to the E[facility]. Please do not respond through this encounter as the response is not directed to a shared pool.     Reason for Disposition   MODERATE rectal bleeding (small blood clots, passing blood without stool, or toilet water turns red)    Protocols used: RECTAL BLEEDING-ADULT-AH

## 2022-02-22 NOTE — PROGRESS NOTES
Chief Complaint   Patient presents with    Anal Bleeding    Gas       Visit Vitals  /77 (BP 1 Location: Left arm)   Pulse (!) 109   Temp 97.3 °F (36.3 °C)   Resp 15   Ht 5' (1.524 m)   Wt 158 lb 6.4 oz (71.8 kg)   LMP 12/04/2012   SpO2 98%   BMI 30.94 kg/m²       1. Have you been to the ER, urgent care clinic since your last visit? Hospitalized since your last visit? No    2. Have you seen or consulted any other health care providers outside of the [address] [place] [address] since your last visit? Include any pap smears or colon screening.  Yes Podiatrist, Rheumotologist

## 2022-02-22 NOTE — PROGRESS NOTES
Bandar Sommers (: 1955) is a 73 y.o. female, established patient, here for evaluation of the following chief complaint(s): Anal Bleeding and Gas     Written by Barrera Rene, as dictated by Dr. Dee Huang MD.      ASSESSMENT/PLAN:  Below is the assessment and plan developed based on review of pertinent history, physical exam, labs, studies, and medications. 1. Blood in the stool  Her colonoscopy from 2019 showed internal hemorrhoids so I rx'd Anusol-HC 2.5% rectal cream to insert TID x10 days. If she continues to have bleeding after a couple days, will refer to Colorectal Surgery or GI.      2. Well controlled diabetes mellitus ([facility] [address]  Recheck CMP, CBC, hgb A1c, and microalbumin. Continue on metformin  mg daily. -      DIABETES FOOT EXAM  -     METABOLIC PANEL, COMPREHENSIVE; Future  -     CBC W/O DIFF; Future  -     HEMOGLOBIN A1C WITH EAG; Future  -     MICROALBUMIN, UR, RAND W/ MICROALB/CREAT RATIO; Future    3. Tendonitis of foot  Followed by Podiatry. 4. Acquired hypothyroidism  Recheck TSH. Continue on levothyroxine 88 mcg daily for now. -     TSH 3RD GENERATION; Future    5. Internal hemorrhoid  Her colonoscopy from 2019 showed internal hemorrhoids so I rx'd Anusol-HC 2.5% rectal cream to insert TID x10 days. If she continues to have bleeding after a couple days, will refer to Colorectal Surgery or GI.    -     hydrocortisone (ANUSOL-HC) 2.5 % rectal cream; Insert  into rectum three (3) times daily for 10 days. , Normal, Disp-28 g, R-0 sent to pharmacy. 6. Leg cramps  Recommended she take OTC CoQ10 for leg cramps. Discussed that abnormal TSH can cause leg cramps and body aches so will recheck TSH. SUBJECTIVE/OBJECTIVE:  HPI   The patient presents today c/o blood in her stool x1 week. She had 1 episode today and 1 episode last week. She had not noticed a lot of blood before, but today there was a lot of bright red blood in her stool and when wiping.  She last had a colonoscopy in 2019 and dx'd with diverticulosis and internal hemorrhoids. She denies any pain with BM, but occasionally has to strain. She is on metformin  mg daily for diabetes. Her FBG has been around 130. She is on levothyroxine 88 mcg daily for hypothyroidism. She has been trying to exercise regularly, but developed tendonitis in her foot which limited her exercise. She is also c/o muscle spasms in her legs. She is followed by Ortho for trigger finger. She had a cortisone injection which helped. Patient Active Problem List   Diagnosis Code    Hypothyroidism E03.9    Anemia D64.9    Hyperlipidemia E78.5    Essential hypertension I10    Well controlled diabetes mellitus ([facility] [address] E11.9    Multiple food allergies Z91.018    Gastroesophageal reflux disease without esophagitis K21.9        Current Outpatient Medications on File Prior to Visit   Medication Sig Dispense Refill    pravastatin (PRAVACHOL) 40 mg tablet Take 1 Tablet by mouth daily. 90 Tablet 0    metFORMIN ER (GLUCOPHAGE XR) 500 mg tablet TAKE ONE TABLET BY MOUTH ONE TIME DAILY WITH DINNER 90 Tablet 0    lisinopriL (PRINIVIL, ZESTRIL) 10 mg tablet Take 1 Tablet by mouth daily. 90 Tablet 0    levothyroxine (SYNTHROID) 88 mcg tablet Take 1 Tablet by mouth Daily (before breakfast). 90 Tablet 1    VITAMIN D3 1,000 unit tablet TAKE ONE TABLET BY MOUTH ONE TIME DAILY 30 Tab 2    Blood-Glucose Meter monitoring kit Please use four times a day and as needed. Dx. E11.9 1 Kit 0    glucose blood VI test strips (BLOOD GLUCOSE TEST) strip Use to test blood sugars 3 times a day and as needed DX. E11.9 100 Strip 6    Lancets misc Use to check blood sugars 3 times daily and as needed 200 Each 11    aspirin delayed-release (ASPIRIN EC) 81 mg tablet Take 81 mg by mouth daily. No current facility-administered medications on file prior to visit.        No Known Allergies    Past Medical History:   Diagnosis Date    Anemia     High cholesterol     Thyroid disease     tyroid nodule removed        Past Surgical History:   Procedure Laterality Date    HX HEENT      MA THYROIDECTOMY         Family History   Problem Relation Age of Onset    Diabetes Mother     Hypertension Mother     High Cholesterol Mother     Diabetes Sister     Hypertension Sister     Stroke Sister     Diabetes Brother     Hypertension Brother     Diabetes Maternal Grandmother     Heart Disease Maternal Grandmother        Social History     Socioeconomic History    Marital status: LEGALLY      Spouse name: Not on file    Number of children: Not on file    Years of education: Not on file    Highest education level: Not on file   Occupational History    Not on file   Tobacco Use    Smoking status: Former Smoker     Packs/day: 0.50     Years: 30.00     Pack years: 15.00     Types: Cigarettes     Quit date: 3/1/2019     Years since quittin.9    Smokeless tobacco: Never Used   Vaping Use    Vaping Use: Never used   Substance and Sexual Activity    Alcohol use: No    Drug use: No    Sexual activity: Yes   Other Topics Concern    Not on file   Social History Narrative    Not on file       No visits with results within 3 Month(s) from this visit. Latest known visit with results is:   Orders Only on 2021   Component Date Value Ref Range Status    Hemoglobin A1c 2021 6.7* 4.0 - 5.6 % Final    Comment: NEW METHOD PLEASE NOTE NEW REFERENCE RANGE  (NOTE)  HbA1C Interpretive Ranges  <5.7              Normal  5.7 - 6.4         Consider Prediabetes  >6.5              Consider Diabetes      Est. average glucose 2021 146  mg/dL Final    TSH 2021 1.67  0.36 - 3.74 uIU/mL Final    Comment:   Due to TSH heterogeneity, both structurally and degree of glycosylation,  monoclonal antibodies used in the TSH assay may not accurately quantitate TSH.   Therefore, this result should be correlated with clinical findings as well as  with other assessments of thyroid function, e.g., free T4, free T3.  Cholesterol, total 09/29/2021 173  <200 MG/DL Final    Triglyceride 09/29/2021 94  <150 MG/DL Final    Comment: Based on NCEP-ATP III:  Triglycerides <150 mg/dL  is considered normal, 150-199  mg/dL  borderline high,  200-499 mg/dL high and  greater than or equal to 500  mg/dL very high.  HDL Cholesterol 09/29/2021 77  MG/DL Final    Comment: Based on NCEP ATP III, HDL Cholesterol <40 mg/dL is considered low and >60  mg/dL is elevated.       LDL, calculated 09/29/2021 77.2  0 - 100 MG/DL Final    Comment: Based on the NCEP-ATP: LDL-C concentrations are considered  optimal <100 mg/dL,  near optimal/above Normal 100-129 mg/dL Borderline High: 130-159, High: 160-189  mg/dL Very High: Greater than or equal to 190 mg/dL      VLDL, calculated 09/29/2021 18.8  MG/DL Final    CHOL/HDL Ratio 09/29/2021 2.2  0.0 - 5.0   Final    WBC 09/29/2021 4.3  3.6 - 11.0 K/uL Final    RBC 09/29/2021 4.20  3.80 - 5.20 M/uL Final    HGB 09/29/2021 12.7  11.5 - 16.0 g/dL Final    HCT 09/29/2021 41.5  35.0 - 47.0 % Final    MCV 09/29/2021 98.8  80.0 - 99.0 FL Final    MCH 09/29/2021 30.2  26.0 - 34.0 PG Final    MCHC 09/29/2021 30.6  30.0 - 36.5 g/dL Final    RDW 09/29/2021 13.0  11.5 - 14.5 % Final    PLATELET 43/66/5207 335  150 - 400 K/uL Final    MPV 09/29/2021 10.9  8.9 - 12.9 FL Final    NRBC 09/29/2021 0.0  0  WBC Final    ABSOLUTE NRBC 09/29/2021 0.00  0.00 - 0.01 K/uL Final    Sodium 09/29/2021 139  136 - 145 mmol/L Final    Potassium 09/29/2021 4.8  3.5 - 5.1 mmol/L Final    Chloride 09/29/2021 105  97 - 108 mmol/L Final    CO2 09/29/2021 31  21 - 32 mmol/L Final    Anion gap 09/29/2021 3* 5 - 15 mmol/L Final    Glucose 09/29/2021 120* 65 - 100 mg/dL Final    BUN 09/29/2021 16  6 - 20 MG/DL Final    Creatinine 09/29/2021 0.79  0.55 - 1.02 MG/DL Final    BUN/Creatinine ratio 09/29/2021 20  12 - 20   Final    GFR est AA 09/29/2021 >60  >60 ml/min/1.73m2 Final    GFR est non-AA 09/29/2021 >60  >60 ml/min/1.73m2 Final    Comment: Estimated GFR is calculated using the IDMS-traceable Modification of Diet in  Renal Disease (MDRD) Study equation, reported for both  Americans  (GFRAA) and non- Americans (GFRNA), and normalized to 1.73m2 body  surface area. The physician must decide which value applies to the patient.  Calcium 09/29/2021 9.2  8.5 - 10.1 MG/DL Final    Bilirubin, total 09/29/2021 0.5  0.2 - 1.0 MG/DL Final    ALT (SGPT) 09/29/2021 18  12 - 78 U/L Final    AST (SGOT) 09/29/2021 10* 15 - 37 U/L Final    Alk. phosphatase 09/29/2021 87  45 - 117 U/L Final    Protein, total 09/29/2021 7.6  6.4 - 8.2 g/dL Final    Albumin 09/29/2021 4.0  3.5 - 5.0 g/dL Final    Globulin 09/29/2021 3.6  2.0 - 4.0 g/dL Final    A-G Ratio 09/29/2021 1.1  1.1 - 2.2   Final      Review of Systems   Constitutional: Negative for activity change, fatigue and unexpected weight change. HENT: Negative for congestion, hearing loss, rhinorrhea and sore throat. Eyes: Negative for discharge. Respiratory: Negative for cough, chest tightness and shortness of breath. Cardiovascular: Negative for leg swelling. Gastrointestinal: Positive for blood in stool. Negative for abdominal pain, constipation and diarrhea. Genitourinary: Negative for dysuria, flank pain, frequency and urgency. Musculoskeletal: Positive for arthralgias and myalgias. Negative for back pain. Skin: Negative for color change and rash. Neurological: Negative for dizziness, light-headedness and headaches. Psychiatric/Behavioral: Negative for dysphoric mood and sleep disturbance. The patient is not nervous/anxious.       Visit Vitals  /77 (BP 1 Location: Left arm)   Pulse (!) 109   Temp 97.3 °F (36.3 °C)   Resp 15   Ht 5' (1.524 m)   Wt 158 lb 6.4 oz (71.8 kg)   LMP 12/04/2012   SpO2 98%   BMI 30.94 kg/m²      Physical Exam  Vitals and nursing note reviewed. Constitutional:       General: She is not in acute distress. Appearance: Normal appearance. She is well-developed. She is not diaphoretic. HENT:      Right Ear: External ear normal.      Left Ear: External ear normal.   Eyes:      General: No scleral icterus. Right eye: No discharge. Left eye: No discharge. Extraocular Movements: Extraocular movements intact. Conjunctiva/sclera: Conjunctivae normal.   Cardiovascular:      Rate and Rhythm: Normal rate and regular rhythm. Pulmonary:      Effort: Pulmonary effort is normal.      Breath sounds: Normal breath sounds. No wheezing. Abdominal:      General: Bowel sounds are normal.      Palpations: Abdomen is soft. Tenderness: There is no abdominal tenderness. Musculoskeletal:      Cervical back: Normal range of motion and neck supple. Feet:      Comments: Diabetic foot exam:     Left: Vibratory sensation normal    Sharp/dull discrimination normal    Filament test normal sensation with micro filament   Pulse DP: 2+ (normal)   Deformities: None  Right: Vibratory sensation normal   Sharp/dull discrimination normal   Filament test normal sensation with micro filament   Pulse DP: 2+ (normal)   Deformities: None   Lymphadenopathy:      Cervical: No cervical adenopathy. Neurological:      Mental Status: She is alert and oriented to person, place, and time. Psychiatric:         Mood and Affect: Mood and affect normal.         An electronic signature was used to authenticate this note.   -- Aneita Leyden

## 2022-02-22 NOTE — TELEPHONE ENCOUNTER
Pt has appt for tomorrow with Nicky Ferrara. Pt wants to know if she can be seen this afternoon? She has a meeting at work her boss wants her there for.

## 2022-02-23 ENCOUNTER — TELEPHONE (OUTPATIENT)
Dept: PRIMARY CARE CLINIC | Age: 67
End: 2022-02-23

## 2022-02-23 LAB
ALBUMIN SERPL-MCNC: 4.6 G/DL (ref 3.5–5)
ALBUMIN/GLOB SERPL: 1.3 {RATIO} (ref 1.1–2.2)
ALP SERPL-CCNC: 82 U/L (ref 45–117)
ALT SERPL-CCNC: 32 U/L (ref 12–78)
ANION GAP SERPL CALC-SCNC: 4 MMOL/L (ref 5–15)
AST SERPL-CCNC: 18 U/L (ref 15–37)
BILIRUB SERPL-MCNC: 0.8 MG/DL (ref 0.2–1)
BUN SERPL-MCNC: 23 MG/DL (ref 6–20)
BUN/CREAT SERPL: 25 (ref 12–20)
CALCIUM SERPL-MCNC: 10.3 MG/DL (ref 8.5–10.1)
CHLORIDE SERPL-SCNC: 99 MMOL/L (ref 97–108)
CO2 SERPL-SCNC: 30 MMOL/L (ref 21–32)
CREAT SERPL-MCNC: 0.91 MG/DL (ref 0.55–1.02)
CREAT UR-MCNC: 140 MG/DL
ERYTHROCYTE [DISTWIDTH] IN BLOOD BY AUTOMATED COUNT: 13.1 % (ref 11.5–14.5)
EST. AVERAGE GLUCOSE BLD GHB EST-MCNC: 146 MG/DL
GLOBULIN SER CALC-MCNC: 3.5 G/DL (ref 2–4)
GLUCOSE SERPL-MCNC: 128 MG/DL (ref 65–100)
HBA1C MFR BLD: 6.7 % (ref 4–5.6)
HCT VFR BLD AUTO: 45.2 % (ref 35–47)
HGB BLD-MCNC: 14.2 G/DL (ref 11.5–16)
MCH RBC QN AUTO: 30.3 PG (ref 26–34)
MCHC RBC AUTO-ENTMCNC: 31.4 G/DL (ref 30–36.5)
MCV RBC AUTO: 96.6 FL (ref 80–99)
MICROALBUMIN UR-MCNC: 1.57 MG/DL
MICROALBUMIN/CREAT UR-RTO: 11 MG/G (ref 0–30)
NRBC # BLD: 0 K/UL (ref 0–0.01)
NRBC BLD-RTO: 0 PER 100 WBC
PLATELET # BLD AUTO: 315 K/UL (ref 150–400)
PMV BLD AUTO: 10.2 FL (ref 8.9–12.9)
POTASSIUM SERPL-SCNC: 4.4 MMOL/L (ref 3.5–5.1)
PROT SERPL-MCNC: 8.1 G/DL (ref 6.4–8.2)
RBC # BLD AUTO: 4.68 M/UL (ref 3.8–5.2)
SODIUM SERPL-SCNC: 133 MMOL/L (ref 136–145)
TSH SERPL DL<=0.05 MIU/L-ACNC: 1.44 UIU/ML (ref 0.36–3.74)
WBC # BLD AUTO: 7 K/UL (ref 3.6–11)

## 2022-02-23 NOTE — TELEPHONE ENCOUNTER
Spoke with patient on the phone, reviewed the labs with her. She is taking the Metformin once daily.

## 2022-02-23 NOTE — TELEPHONE ENCOUNTER
Patient said she was returning a call from the office. Patient said she thinks it's regarding her results. Patient said she missed the call because she is at work. Patient said she's going back to work at 12:00.

## 2022-02-23 NOTE — PROGRESS NOTES
Results reviewed. Release via [employer], hope you are feeling better. How is rectal cream working? Your blood work came back fine. Iron numbers look fine. Diabetes number ( HbA1C) is stable. Are you taking Metformin once a day?

## 2022-03-18 PROBLEM — Z91.018 MULTIPLE FOOD ALLERGIES: Status: ACTIVE | Noted: 2021-03-22

## 2022-03-19 PROBLEM — E11.9 WELL CONTROLLED DIABETES MELLITUS (HCC): Status: ACTIVE | Noted: 2019-09-25

## 2022-03-20 PROBLEM — K21.9 GASTROESOPHAGEAL REFLUX DISEASE WITHOUT ESOPHAGITIS: Status: ACTIVE | Noted: 2021-03-22

## 2022-03-21 ENCOUNTER — TELEPHONE (OUTPATIENT)
Dept: PRIMARY CARE CLINIC | Age: 67
End: 2022-03-21

## 2022-03-21 NOTE — TELEPHONE ENCOUNTER
Pt states she's been having R arm pain for quite some time. Says the last ortho doctor she's been to has told her she needs a massage. Pt would like another referral to ortho or an x-ray of her arm.

## 2022-04-25 ENCOUNTER — TRANSCRIBE ORDER (OUTPATIENT)
Dept: SCHEDULING | Age: 67
End: 2022-04-25

## 2022-04-25 DIAGNOSIS — M54.12 CERVICAL RADICULOPATHY, ACUTE: Primary | ICD-10-CM

## 2022-04-27 DIAGNOSIS — E78.00 HYPERCHOLESTEROLEMIA: ICD-10-CM

## 2022-04-27 DIAGNOSIS — E11.9 WELL CONTROLLED DIABETES MELLITUS (HCC): ICD-10-CM

## 2022-04-27 DIAGNOSIS — I10 HYPERTENSION, UNSPECIFIED TYPE: ICD-10-CM

## 2022-04-27 RX ORDER — PRAVASTATIN SODIUM 40 MG/1
TABLET ORAL
Qty: 90 TABLET | Refills: 0 | Status: SHIPPED | OUTPATIENT
Start: 2022-04-27 | End: 2022-05-04

## 2022-04-27 RX ORDER — LISINOPRIL 10 MG/1
TABLET ORAL
Qty: 90 TABLET | Refills: 0 | Status: SHIPPED | OUTPATIENT
Start: 2022-04-27 | End: 2022-05-04

## 2022-04-27 RX ORDER — METFORMIN HYDROCHLORIDE 500 MG/1
TABLET, EXTENDED RELEASE ORAL
Qty: 90 TABLET | Refills: 0 | Status: SHIPPED | OUTPATIENT
Start: 2022-04-27 | End: 2022-05-04

## 2022-05-04 DIAGNOSIS — E11.9 WELL CONTROLLED DIABETES MELLITUS (HCC): ICD-10-CM

## 2022-05-04 DIAGNOSIS — E78.00 HYPERCHOLESTEROLEMIA: ICD-10-CM

## 2022-05-04 DIAGNOSIS — I10 HYPERTENSION, UNSPECIFIED TYPE: ICD-10-CM

## 2022-05-04 RX ORDER — METFORMIN HYDROCHLORIDE 500 MG/1
TABLET, EXTENDED RELEASE ORAL
Qty: 90 TABLET | Refills: 0 | Status: SHIPPED | OUTPATIENT
Start: 2022-05-04 | End: 2022-07-08

## 2022-05-04 RX ORDER — LISINOPRIL 10 MG/1
TABLET ORAL
Qty: 90 TABLET | Refills: 0 | Status: SHIPPED | OUTPATIENT
Start: 2022-05-04 | End: 2022-07-08

## 2022-05-04 RX ORDER — PRAVASTATIN SODIUM 40 MG/1
TABLET ORAL
Qty: 90 TABLET | Refills: 0 | Status: SHIPPED | OUTPATIENT
Start: 2022-05-04 | End: 2022-07-08

## 2022-06-13 ENCOUNTER — HOSPITAL ENCOUNTER (OUTPATIENT)
Dept: MRI IMAGING | Age: 67
Discharge: HOME | End: 2022-06-13
Attending: NEUROLOGICAL SURGERY
Payer: COMMERCIAL

## 2022-06-13 DIAGNOSIS — M54.12 CERVICAL RADICULOPATHY, ACUTE: ICD-10-CM

## 2022-06-13 PROCEDURE — 72141 MRI NECK SPINE W/O DYE: CPT

## 2022-07-08 DIAGNOSIS — I10 HYPERTENSION, UNSPECIFIED TYPE: ICD-10-CM

## 2022-07-08 DIAGNOSIS — E78.00 HYPERCHOLESTEROLEMIA: ICD-10-CM

## 2022-07-08 DIAGNOSIS — E11.9 WELL CONTROLLED DIABETES MELLITUS (HCC): ICD-10-CM

## 2022-07-08 RX ORDER — LISINOPRIL 10 MG/1
TABLET ORAL
Qty: 90 TABLET | Refills: 0 | Status: SHIPPED | OUTPATIENT
Start: 2022-07-08 | End: 2022-10-06

## 2022-07-08 RX ORDER — PRAVASTATIN SODIUM 40 MG/1
TABLET ORAL
Qty: 90 TABLET | Refills: 0 | Status: SHIPPED | OUTPATIENT
Start: 2022-07-08 | End: 2022-10-06

## 2022-07-08 RX ORDER — METFORMIN HYDROCHLORIDE 500 MG/1
TABLET, EXTENDED RELEASE ORAL
Qty: 90 TABLET | Refills: 0 | Status: SHIPPED | OUTPATIENT
Start: 2022-07-08 | End: 2022-09-06

## 2022-08-01 ENCOUNTER — NURSE TRIAGE (OUTPATIENT)
Dept: OTHER | Facility: CLINIC | Age: 67
End: 2022-08-01

## 2022-08-01 ENCOUNTER — TELEPHONE (OUTPATIENT)
Dept: PRIMARY CARE CLINIC | Age: 67
End: 2022-08-01

## 2022-08-01 NOTE — TELEPHONE ENCOUNTER
Received call from Florence at [facility] with Red Flag Complaint. Subjective: Caller states \"I have L ear pain\"     Current Symptoms: L ear pain and L facial pain. L side of face was swollen, getting better. Wears dentures and took them out as they were causing pain as well. Unable to put them back in due to the pain in her gums and her roof of her mouth. Drinking fluids. Unable to eat. Onset: 3 days ago; worsening    Associated Symptoms: reduced activity, reduced appetite    Pain Severity: 4-5/10; burning; constant, moderate. Temperature: denies fever     What has been tried: Ice, jello, steroids that her daughter had left over. Steroids have helped some with the swelling. LMP: NA Pregnant: NA    Recommended disposition: See HCP within 4 Hours (or PCP triage)    Care advice provided, patient verbalizes understanding; denies any other questions or concerns; instructed to call back for any new or worsening symptoms. Writer provided warm transfer to Sindhu Loyola at [address] [place] [address] for second level triage    Attention Provider: Thank you for allowing me to participate in the care of your patient. The patient was connected to triage in response to information provided to the [facility]. Please do not respond through this encounter as the response is not directed to a shared pool.     Reason for Disposition   Face is very swollen    Protocols used: Mouth Pain-ADULT-AH

## 2022-08-01 NOTE — TELEPHONE ENCOUNTER
Called and spoke with the patient, she reports to be having sinus pressure and pain in mouth specifically the roof of mouth and swelling that started over the weekend. Patient have an appointment for tomorrow morning. I told her that unfortunately I do not have any openings with Dr Miki Lyons today, that if the patient continues that she should go to and Urgent care- or wait for morning appointment tomorrow.

## 2022-08-01 NOTE — TELEPHONE ENCOUNTER
Patient said she is having left ear pain, facial swelling on the left side of her face and soreness in the upper roof of her mouth. I was able to schedule the patient for 8/2/22 but she said she would rather be seen today.   Patient asked if Dr. Ny Hawkins can call her back today

## 2022-08-02 ENCOUNTER — OFFICE VISIT (OUTPATIENT)
Dept: PRIMARY CARE CLINIC | Age: 67
End: 2022-08-02
Payer: COMMERCIAL

## 2022-08-02 VITALS
DIASTOLIC BLOOD PRESSURE: 81 MMHG | RESPIRATION RATE: 16 BRPM | SYSTOLIC BLOOD PRESSURE: 131 MMHG | TEMPERATURE: 97.8 F | HEIGHT: 60 IN | HEART RATE: 85 BPM | BODY MASS INDEX: 31.73 KG/M2 | WEIGHT: 161.6 LBS | OXYGEN SATURATION: 100 %

## 2022-08-02 DIAGNOSIS — K06.9 GUM DISEASE: ICD-10-CM

## 2022-08-02 DIAGNOSIS — B37.0 ORAL THRUSH: ICD-10-CM

## 2022-08-02 DIAGNOSIS — E11.9 WELL CONTROLLED DIABETES MELLITUS (HCC): ICD-10-CM

## 2022-08-02 DIAGNOSIS — K13.79 ACUTE PAIN OF MOUTH: Primary | ICD-10-CM

## 2022-08-02 DIAGNOSIS — M47.22 OSTEOARTHRITIS OF SPINE WITH RADICULOPATHY, CERVICAL REGION: ICD-10-CM

## 2022-08-02 DIAGNOSIS — K06.2 DENTURE IRRITATION: ICD-10-CM

## 2022-08-02 PROCEDURE — 1123F ACP DISCUSS/DSCN MKR DOCD: CPT | Performed by: INTERNAL MEDICINE

## 2022-08-02 PROCEDURE — 3044F HG A1C LEVEL LT 7.0%: CPT | Performed by: INTERNAL MEDICINE

## 2022-08-02 PROCEDURE — 99214 OFFICE O/P EST MOD 30 MIN: CPT | Performed by: INTERNAL MEDICINE

## 2022-08-02 RX ORDER — AZITHROMYCIN 500 MG/1
500 TABLET, FILM COATED ORAL DAILY
Qty: 5 TABLET | Refills: 0 | Status: SHIPPED | OUTPATIENT
Start: 2022-08-02 | End: 2022-08-07

## 2022-08-02 RX ORDER — NYSTATIN 100000 [USP'U]/ML
200000 SUSPENSION ORAL 4 TIMES DAILY
Qty: 80 ML | Refills: 0 | Status: SHIPPED | OUTPATIENT
Start: 2022-08-02 | End: 2022-08-12

## 2022-08-02 RX ORDER — AA/PROT/LYSINE/METHIO/VIT C/B6 50-12.5 MG
TABLET ORAL
COMMUNITY

## 2022-08-02 RX ORDER — CALCIUM CARBONATE/VITAMIN D3 600 MG-125
TABLET ORAL
COMMUNITY

## 2022-08-02 NOTE — PROGRESS NOTES
Chief Complaint   Patient presents with    Mouth Pain     Started this past week       Visit Vitals  /81 (BP 1 Location: Left upper arm)   Pulse 85   Temp 97.8 °F (36.6 °C)   Resp 16   Ht 5' (1.524 m)   Wt 161 lb 9.6 oz (73.3 kg)   LMP 12/04/2012   SpO2 100%   BMI 31.56 kg/m²       1. Have you been to the ER, urgent care clinic since your last visit? Hospitalized since your last visit? No    2. Have you seen or consulted any other health care providers outside of the [address] [place] [address] since your last visit? Include any pap smears or colon screening.  Yes Neurologist

## 2022-08-02 NOTE — LETTER
NOTIFICATION RETURN TO WORK / SCHOOL    8/2/2022 8:54 AM    Ms. Hermenia Nageotte  [address] [facility] [address]      To Whom It May Concern:    Hermenia Nageotte is currently under the care of Charanjit Love. She was seen today for a sick visit and she has been sick since 07/31. Medications prescribed. She will return to work/school on: 08/04    If there are questions or concerns please have the patient contact our office.         Sincerely,      Alexandra Crespo MD

## 2022-08-02 NOTE — Clinical Note
NOTIFICATION RETURN TO WORK / SCHOOL    8/2/2022 8:56 AM    Ms. Hermenia Nageotte  [address] [place] [address],[address] [address]      To Whom It May Concern:    Hermenia Nageotte is currently under the care of Charanjit Love. She will return to work/school on: ***    If there are questions or concerns please have the patient contact our office.         Sincerely,      Alexandra Crespo MD

## 2022-08-02 NOTE — PROGRESS NOTES
Jenny Arroyo (: 1955) is a 73 y.o. female, established patient, here for evaluation of the following chief complaint(s):  Mouth Pain (Started this past week)       ASSESSMENT/PLAN:  Below is the assessment and plan developed based on review of pertinent history, physical exam, labs, studies, and medications. 1. Acute pain of mouth  -     nystatin (MYCOSTATIN) 100,000 unit/mL suspension; Take 2 mL by mouth four (4) times daily for 10 days. swish and spit, Normal, Disp-80 mL, R-0  -     azithromycin (ZITHROMAX) 500 mg tab; Take 1 Tablet by mouth in the morning for 5 days. , Normal, Disp-5 Tablet, R-0  2. Oral thrush  -     nystatin (MYCOSTATIN) 100,000 unit/mL suspension; Take 2 mL by mouth four (4) times daily for 10 days. swish and spit, Normal, Disp-80 mL, R-0  3. Well controlled diabetes mellitus ([address]   On Metformin. Told her to add protein low sugar shakes in her diet if she can`t eat solids.  -     HEMOGLOBIN A1C WITH EAG; Future  -     METABOLIC PANEL, COMPREHENSIVE; Future  -     CBC W/O DIFF; Future  4. Denture irritation  -     azithromycin (ZITHROMAX) 500 mg tab; Take 1 Tablet by mouth in the morning for 5 days. , Normal, Disp-5 Tablet, R-0. Told her needs to find a new dentist soon. 5. Gum disease  -     azithromycin (ZITHROMAX) 500 mg tab; Take 1 Tablet by mouth in the morning for 5 days. , Normal, Disp-5 Tablet, R-0  6. Osteoarthritis of spine with radiculopathy, cervical region  Followed by Neurosurgeon. Planning to go for Neck Surgery in November. SUBJECTIVE/OBJECTIVE:    Patient seen today with mild pain which started on Friday. She first noticed a pain in her left ear and then move to her neck in her mouth. On Saturday morning she felt her face was swollen on the left side and had an excruciating pain in her gums and upper palate. For last 2 days she is not able to eat any fruit as mild burns.     She had an denture issues in the past and was seen by her dentist earlier this year but could not keep the follow-up appointment as her dentist left the practice. She is still looking for a new dentist and is concerned about her gum disease. Since last seen in the office she was also seen by neurosurgeon who ordered an MRI for her neck as she was having tingling and numbness in her arms. Neck MRI showed degenerative disc disease with herniation. They are planning neck surgery in November. She will bring the reports for me and will come back for preop visit. She is concerned about her diabetes as last few weeks she has been having soft bread, soups and smoothies. Past Medical History:   Diagnosis Date    Anemia     Cervical herniated disc     High cholesterol     Thyroid disease     tyroid nodule removed 1998     Past Surgical History:   Procedure Laterality Date    HX HEENT      AL THYROIDECTOMY       Lab Results   Component Value Date/Time    WBC 7.0 02/22/2022 02:06 PM    HGB 14.2 02/22/2022 02:06 PM    HCT 45.2 02/22/2022 02:06 PM    PLATELET 038 90/59/2696 02:06 PM    MCV 96.6 02/22/2022 02:06 PM     Lab Results   Component Value Date/Time    Hemoglobin A1c 6.7 (H) 02/22/2022 02:06 PM    Hemoglobin A1c 6.7 (H) 09/29/2021 10:11 AM    Hemoglobin A1c 6.2 (H) 12/22/2020 03:00 PM    Glucose 128 (H) 02/22/2022 02:06 PM    Microalbumin/Creat ratio (mg/g creat) 11 02/22/2022 02:06 PM    Microalbumin,urine random 1.57 02/22/2022 02:06 PM    LDL,Direct 95 01/27/2014 10:34 AM    LDL, calculated 77.2 09/29/2021 10:11 AM    Creatinine 0.91 02/22/2022 02:06 PM      Lab Results   Component Value Date/Time    ALT (SGPT) 32 02/22/2022 02:06 PM    Alk.  phosphatase 82 02/22/2022 02:06 PM    Bilirubin, total 0.8 02/22/2022 02:06 PM    Albumin 4.6 02/22/2022 02:06 PM    Protein, total 8.1 02/22/2022 02:06 PM    PLATELET 770 98/31/5969 02:06 PM       Lab Results   Component Value Date/Time    TSH 1.44 02/22/2022 02:06 PM    T4, Free 1.62 05/29/2015 10:09 AM       Review of Systems   Constitutional: Negative for appetite change and fever. HENT:  Positive for ear pain and facial swelling. Negative for congestion, nosebleeds and sinus pressure. Respiratory:  Negative for cough and chest tightness. Cardiovascular:  Negative for chest pain and leg swelling. Gastrointestinal:  Positive for abdominal distention. Negative for abdominal pain and blood in stool. Endocrine: Negative for polydipsia and polyphagia. Genitourinary:  Negative for frequency and urgency. Musculoskeletal:  Negative for arthralgias and myalgias. Neurological:  Negative for dizziness and headaches. Hematological:  Negative for adenopathy. Psychiatric/Behavioral:  Negative for behavioral problems and sleep disturbance. Physical Exam  Visit Vitals  /81 (BP 1 Location: Left upper arm)   Pulse 85   Temp 97.8 °F (36.6 °C)   Resp 16   Ht 5' (1.524 m)   Wt 161 lb 9.6 oz (73.3 kg)   LMP 12/04/2012   SpO2 100%   BMI 31.56 kg/m²        Vitals and nursing note reviewed. Constitutional:       Appearance: Normal appearance. obese. Eyes:      Extraocular Movements: Extraocular movements intact. Pupils: Pupils are equal, round, and reactive to light. MOUTH: white coating of tongue, white patches on palate, redness of gums. Cardiovascular:      Rate and Rhythm: Normal rate and regular rhythm. Pulmonary:      Effort: Pulmonary effort is normal.      Breath sounds: Normal breath sounds. Abdominal:      General: Bowel sounds are normal. There is no distension. Palpations: Abdomen is soft. Musculoskeletal:         General: No swelling. Normal range of motion. Cervical back: Normal range of motion and neck supple. Right lower leg: No edema. Left lower leg: No edema. Neurological:      General: No focal deficit present. Mental Status:  Alert and oriented to person, place, and time. Motor: No weakness.    Psychiatric:         Mood and Affect: Mood normal.         Behavior: Behavior normal.         An electronic signature was used to authenticate this note.   -- Kayleigh Mnedoza MD

## 2022-09-06 DIAGNOSIS — E11.9 WELL CONTROLLED DIABETES MELLITUS (HCC): ICD-10-CM

## 2022-09-06 RX ORDER — METFORMIN HYDROCHLORIDE 500 MG/1
TABLET, EXTENDED RELEASE ORAL
Qty: 90 TABLET | Refills: 0 | Status: SHIPPED | OUTPATIENT
Start: 2022-09-06 | End: 2022-10-06

## 2022-10-06 DIAGNOSIS — E78.00 HYPERCHOLESTEROLEMIA: ICD-10-CM

## 2022-10-06 DIAGNOSIS — E03.9 ACQUIRED HYPOTHYROIDISM: ICD-10-CM

## 2022-10-06 DIAGNOSIS — E11.9 WELL CONTROLLED DIABETES MELLITUS (HCC): ICD-10-CM

## 2022-10-06 DIAGNOSIS — I10 HYPERTENSION, UNSPECIFIED TYPE: ICD-10-CM

## 2022-10-06 RX ORDER — LISINOPRIL 10 MG/1
TABLET ORAL
Qty: 90 TABLET | Refills: 0 | Status: SHIPPED | OUTPATIENT
Start: 2022-10-06

## 2022-10-06 RX ORDER — LEVOTHYROXINE SODIUM 88 UG/1
TABLET ORAL
Qty: 90 TABLET | Refills: 1 | Status: SHIPPED | OUTPATIENT
Start: 2022-10-06

## 2022-10-06 RX ORDER — METFORMIN HYDROCHLORIDE 500 MG/1
TABLET, EXTENDED RELEASE ORAL
Qty: 90 TABLET | Refills: 0 | Status: SHIPPED | OUTPATIENT
Start: 2022-10-06

## 2022-10-06 RX ORDER — PRAVASTATIN SODIUM 40 MG/1
TABLET ORAL
Qty: 90 TABLET | Refills: 0 | Status: SHIPPED | OUTPATIENT
Start: 2022-10-06

## 2022-11-28 ENCOUNTER — OFFICE VISIT (OUTPATIENT)
Dept: PRIMARY CARE CLINIC | Age: 67
End: 2022-11-28
Payer: COMMERCIAL

## 2022-11-28 VITALS
WEIGHT: 156.6 LBS | OXYGEN SATURATION: 99 % | RESPIRATION RATE: 16 BRPM | DIASTOLIC BLOOD PRESSURE: 80 MMHG | SYSTOLIC BLOOD PRESSURE: 114 MMHG | TEMPERATURE: 98 F | HEART RATE: 89 BPM | BODY MASS INDEX: 30.74 KG/M2 | HEIGHT: 60 IN

## 2022-11-28 DIAGNOSIS — E03.9 ACQUIRED HYPOTHYROIDISM: ICD-10-CM

## 2022-11-28 DIAGNOSIS — E11.9 CONTROLLED TYPE 2 DIABETES MELLITUS WITHOUT COMPLICATION, WITHOUT LONG-TERM CURRENT USE OF INSULIN (HCC): Primary | ICD-10-CM

## 2022-11-28 DIAGNOSIS — R53.83 OTHER FATIGUE: ICD-10-CM

## 2022-11-28 DIAGNOSIS — Z23 NEEDS FLU SHOT: ICD-10-CM

## 2022-11-28 DIAGNOSIS — E78.2 MIXED HYPERLIPIDEMIA: ICD-10-CM

## 2022-11-28 DIAGNOSIS — R25.2 BILATERAL LEG CRAMPS: ICD-10-CM

## 2022-11-28 DIAGNOSIS — R04.0 EPISTAXIS: ICD-10-CM

## 2022-11-28 PROCEDURE — 99214 OFFICE O/P EST MOD 30 MIN: CPT | Performed by: INTERNAL MEDICINE

## 2022-11-28 PROCEDURE — 1123F ACP DISCUSS/DSCN MKR DOCD: CPT | Performed by: INTERNAL MEDICINE

## 2022-11-28 PROCEDURE — 3078F DIAST BP <80 MM HG: CPT | Performed by: INTERNAL MEDICINE

## 2022-11-28 PROCEDURE — 3074F SYST BP LT 130 MM HG: CPT | Performed by: INTERNAL MEDICINE

## 2022-11-28 PROCEDURE — 3044F HG A1C LEVEL LT 7.0%: CPT | Performed by: INTERNAL MEDICINE

## 2022-11-28 NOTE — PROGRESS NOTES
Chief Complaint   Patient presents with    Nasal Congestion    Diabetes    Fatigue       Visit Vitals  /80 (BP 1 Location: Right arm)   Pulse 89   Temp 98 °F (36.7 °C)   Resp 16   Ht 5' (1.524 m)   Wt 156 lb 9.6 oz (71 kg)   LMP 12/04/2012   SpO2 99%   BMI 30.58 kg/m²       1. Have you been to the ER, urgent care clinic since your last visit? Hospitalized since your last visit? No    2. Have you seen or consulted any other health care providers outside of the [address] [place] [address] since your last visit? Include any pap smears or colon screening.  Yes Eye doctor- OCT 26 cataracts Dx, Foot Dr, Neurologist,

## 2022-11-28 NOTE — PROGRESS NOTES
Seen eye doctor [facility]. Recommended cataract Sx. Trisha Garcia (: 1955) is a 78 y.o. female, established patient, here for evaluation of the following chief complaint(s):  Nasal Congestion, Diabetes, and Fatigue       ASSESSMENT/PLAN:  Below is the assessment and plan developed based on review of pertinent history, physical exam, labs, studies, and medications. 1. Controlled type 2 diabetes mellitus without complication, without long-term current use of insulin (HCC)  -     HEMOGLOBIN A1C WITH EAG; Future  She has been taking metformin daily. 2. Mixed hyperlipidemia  -     METABOLIC PANEL, COMPREHENSIVE; Future  -     CBC W/O DIFF; Future  -     LIPID PANEL; Future  3. Acquired hypothyroidism  -     TSH 3RD GENERATION; Future  4. Epistaxis  Told her no need for ENT referral at this time as I did not see any polyp or scab on exam.  Recommended using nasal saline spray to keep the nostrils moist.  5. Needs flu shot  -     INFLUENZA, FLUAD, (AGE 65 Y+), IM, PF, 0.5 ML  6. Other fatigue  I told her it could be due to abnormal TSH as she had fatigue with elevated levels in the past.   7. Bilateral leg cramps  Recommended taking vitamin B12 over-the-counter 1000 mcg daily dose. SUBJECTIVE/OBJECTIVE:  Patient seen today for follow-up. She had an episode of nosebleed about 3 weeks ago and was not able to get an acute care appointment in the office. She did not go to the emergency room or urgent care. Bleeding stopped on it`s own and she has not had any more episodes. She did admit that her nose has been dried lately but she is not using any nasal spray. Her blood sugars have been running higher in the morning in the range 130-140s. She has been feeling very tired lately and would like to get her thyroid levels checked. She has been taking all her medications as instructed. Lately,  she has not been sleeping well due to the cramps in her legs which are worse at night.   She was seen by an eye doctor at [address] [place] [address] eye [facility] and they had recommended cataract surgery in the near future. She needs a flu vaccine. Visit Vitals  /80 (BP 1 Location: Right arm)   Pulse 89   Temp 98 °F (36.7 °C)   Resp 16   Ht 5' (1.524 m)   Wt 156 lb 9.6 oz (71 kg)   LMP 12/04/2012   SpO2 99%   BMI 30.58 kg/m²      Patient Active Problem List   Diagnosis Code    Hypothyroidism E03.9    Anemia D64.9    Hyperlipidemia E78.5    Essential hypertension I10    Well controlled diabetes mellitus ([address] E11.9    Multiple food allergies Z91.018    Gastroesophageal reflux disease without esophagitis K21.9        Current Outpatient Medications on File Prior to Visit   Medication Sig Dispense Refill    OTHER tumeric      levothyroxine (SYNTHROID) 88 mcg tablet TAKE ONE TABLET BY MOUTH EVERY MORNING BEFORE BREAKFAST 90 Tablet 1    pravastatin (PRAVACHOL) 40 mg tablet TAKE ONE TABLET BY MOUTH ONE TIME DAILY 90 Tablet 0    lisinopriL (PRINIVIL, ZESTRIL) 10 mg tablet TAKE ONE TABLET BY MOUTH ONE TIME DAILY 90 Tablet 0    metFORMIN ER (GLUCOPHAGE XR) 500 mg tablet TAKE ONE TABLET BY MOUTH ONE TIME DAILY WITH DINNER 90 Tablet 0    calcium-cholecalciferol, d3, (CALCIUM 600 + D) 600-125 mg-unit tab Take  by mouth. Blood-Glucose Meter monitoring kit Please use four times a day and as needed. Dx. E11.9 1 Kit 0    glucose blood VI test strips (BLOOD GLUCOSE TEST) strip Use to test blood sugars 3 times a day and as needed DX. E11.9 100 Strip 6    Lancets misc Use to check blood sugars 3 times daily and as needed 200 Each 11    aspirin delayed-release 81 mg tablet Take 81 mg by mouth daily. coenzyme q10 10 mg cap Take  by mouth. (Patient not taking: Reported on 11/28/2022)       No current facility-administered medications on file prior to visit.        No Known Allergies    Past Medical History:   Diagnosis Date    Anemia     Cervical herniated disc     High cholesterol     Thyroid disease     tyroid nodule removed 1998       Past Surgical History:   Procedure Laterality Date    HX HEENT      HI THYROIDECTOMY         Family History   Problem Relation Age of Onset    Diabetes Mother     Hypertension Mother     High Cholesterol Mother     Diabetes Sister     Hypertension Sister     Stroke Sister     Diabetes Brother     Hypertension Brother     Diabetes Maternal Grandmother     Heart Disease Maternal Grandmother        Social History     Socioeconomic History    Marital status: LEGALLY      Spouse name: Not on file    Number of children: Not on file    Years of education: Not on file    Highest education level: Not on file   Occupational History    Not on file   Tobacco Use    Smoking status: Former     Packs/day: 0.50     Years: 30.00     Pack years: 15.00     Types: Cigarettes     Quit date: 3/1/2019     Years since quitting: 3.7    Smokeless tobacco: Never   Vaping Use    Vaping Use: Never used   Substance and Sexual Activity    Alcohol use: No    Drug use: No    Sexual activity: Yes   Other Topics Concern    Not on file   Social History Narrative    Not on file     Social Determinants of Health     Financial Resource Strain: Medium Risk    Difficulty of Paying Living Expenses: Somewhat hard   Food Insecurity: Food Insecurity Present    Worried About Running Out of Food in the Last Year: Never true    Ran Out of Food in the Last Year: Sometimes true   Transportation Needs: Not on file   Physical Activity: Not on file   Stress: Not on file   Social Connections: Not on file   Intimate Partner Violence: Not on file   Housing Stability: Not on file       No visits with results within 3 Month(s) from this visit.    Latest known visit with results is:   Orders Only on 08/02/2022   Component Date Value Ref Range Status    WBC 08/02/2022 8.7  3.6 - 11.0 K/uL Final    RBC 08/02/2022 4.49  3.80 - 5.20 M/uL Final    HGB 08/02/2022 13.9  11.5 - 16.0 g/dL Final    HCT 08/02/2022 42.6  35.0 - 47.0 % Final    MCV 08/02/2022 94.9  80.0 - 99.0 FL Final    MCH 08/02/2022 31.0  26.0 - 34.0 PG Final    MCHC 08/02/2022 32.6  30.0 - 36.5 g/dL Final    RDW 08/02/2022 13.2  11.5 - 14.5 % Final    PLATELET 74/21/0798 822  150 - 400 K/uL Final    MPV 08/02/2022 10.1  8.9 - 12.9 FL Final    NRBC 08/02/2022 0.0  0  WBC Final    ABSOLUTE NRBC 08/02/2022 0.00  0.00 - 0.01 K/uL Final    Sodium 08/02/2022 139  136 - 145 mmol/L Final    Potassium 08/02/2022 4.8  3.5 - 5.1 mmol/L Final    Chloride 08/02/2022 107  97 - 108 mmol/L Final    CO2 08/02/2022 26  21 - 32 mmol/L Final    Anion gap 08/02/2022 6  5 - 15 mmol/L Final    Glucose 08/02/2022 122 (A)  65 - 100 mg/dL Final    BUN 08/02/2022 17  6 - 20 MG/DL Final    Creatinine 08/02/2022 0.88  0.55 - 1.02 MG/DL Final    BUN/Creatinine ratio 08/02/2022 19  12 - 20   Final    GFR est AA 08/02/2022 >60  >60 ml/min/1.73m2 Final    GFR est non-AA 08/02/2022 >60  >60 ml/min/1.73m2 Final    Estimated GFR is calculated using the IDMS-traceable Modification of Diet in Renal Disease (MDRD) Study equation, reported for both  Americans (GFRAA) and non- Americans (GFRNA), and normalized to 1.73m2 body surface area. The physician must decide which value applies to the patient. Calcium 08/02/2022 9.6  8.5 - 10.1 MG/DL Final    Bilirubin, total 08/02/2022 0.3  0.2 - 1.0 MG/DL Final    ALT (SGPT) 08/02/2022 26  12 - 78 U/L Final    AST (SGOT) 08/02/2022 9 (A)  15 - 37 U/L Final    Alk.  phosphatase 08/02/2022 86  45 - 117 U/L Final    Protein, total 08/02/2022 7.7  6.4 - 8.2 g/dL Final    Albumin 08/02/2022 4.2  3.5 - 5.0 g/dL Final    Globulin 08/02/2022 3.5  2.0 - 4.0 g/dL Final    A-G Ratio 08/02/2022 1.2  1.1 - 2.2   Final    Hemoglobin A1c 08/02/2022 6.8 (A)  4.0 - 5.6 % Final    Comment: NEW METHOD  PLEASE NOTE NEW REFERENCE RANGE  (NOTE)  HbA1C Interpretive Ranges  <5.7              Normal  5.7 - 6.4         Consider Prediabetes  >6.5              Consider Diabetes      Est. average glucose 08/02/2022 148 mg/dL Final    SAMPLES BEING HELD 08/02/2022 1SST   Final    COMMENT 08/02/2022 Add-on orders for these samples will be processed based on acceptable specimen integrity and analyte stability, which may vary by analyte. Final      Review of Systems   Constitutional:  Positive for fatigue. Negative for activity change and unexpected weight change. HENT:  Positive for congestion and sinus pressure. Negative for hearing loss and sore throat. Eyes:  Negative for discharge. Respiratory:  Negative for cough, chest tightness and shortness of breath. Cardiovascular:  Negative for leg swelling. Gastrointestinal:  Negative for abdominal pain, constipation and diarrhea. Genitourinary:  Negative for dysuria, flank pain, frequency and urgency. Musculoskeletal:  Positive for myalgias. Negative for arthralgias and back pain. Skin:  Negative for color change and rash. Neurological:  Negative for dizziness, light-headedness and headaches. Psychiatric/Behavioral:  Positive for sleep disturbance. Negative for dysphoric mood. The patient is not nervous/anxious. Physical Exam    Vitals and nursing note reviewed. Constitutional:       Appearance: Normal appearance. obese. Eyes:      Extraocular Movements: Extraocular movements intact. Pupils: Pupils are equal, round, and reactive to light. Cardiovascular:      Rate and Rhythm: Normal rate and regular rhythm. Pulmonary:      Effort: Pulmonary effort is normal.      Breath sounds: Normal breath sounds. Abdominal:      General: Bowel sounds are normal. There is no distension. Palpations: Abdomen is soft. Musculoskeletal:         General: No swelling. Normal range of motion. Cervical back: Normal range of motion and neck supple. Right lower leg: No edema. Left lower leg: No edema. Neurological:      General: No focal deficit present. Mental Status:  Alert and oriented to person, place, and time.       Motor: No weakness. Psychiatric:         Mood and Affect: Mood normal.         Behavior: Behavior normal.           An electronic signature was used to authenticate this note.   -- Deneen Landry MD

## 2022-12-27 DIAGNOSIS — E78.00 HYPERCHOLESTEROLEMIA: ICD-10-CM

## 2022-12-28 RX ORDER — PRAVASTATIN SODIUM 40 MG/1
TABLET ORAL
Qty: 90 TABLET | Refills: 0 | Status: SHIPPED | OUTPATIENT
Start: 2022-12-28

## 2023-01-04 DIAGNOSIS — E03.9 ACQUIRED HYPOTHYROIDISM: ICD-10-CM

## 2023-01-04 DIAGNOSIS — I10 HYPERTENSION, UNSPECIFIED TYPE: ICD-10-CM

## 2023-01-04 RX ORDER — LEVOTHYROXINE SODIUM 88 UG/1
TABLET ORAL
Qty: 90 TABLET | Refills: 1 | Status: SHIPPED | OUTPATIENT
Start: 2023-01-04

## 2023-01-04 RX ORDER — LISINOPRIL 10 MG/1
TABLET ORAL
Qty: 90 TABLET | Refills: 0 | Status: SHIPPED | OUTPATIENT
Start: 2023-01-04

## 2023-02-15 ENCOUNTER — TRANSCRIBE ORDER (OUTPATIENT)
Dept: SCHEDULING | Age: 68
End: 2023-02-15

## 2023-02-15 DIAGNOSIS — Z12.31 VISIT FOR SCREENING MAMMOGRAM: Primary | ICD-10-CM

## 2023-02-28 DIAGNOSIS — E11.9 WELL CONTROLLED DIABETES MELLITUS (HCC): ICD-10-CM

## 2023-02-28 DIAGNOSIS — I10 HYPERTENSION, UNSPECIFIED TYPE: ICD-10-CM

## 2023-02-28 DIAGNOSIS — E78.00 HYPERCHOLESTEROLEMIA: ICD-10-CM

## 2023-02-28 RX ORDER — METFORMIN HYDROCHLORIDE 500 MG/1
TABLET, EXTENDED RELEASE ORAL
Qty: 90 TABLET | Refills: 0 | Status: SHIPPED | OUTPATIENT
Start: 2023-02-28 | End: 2023-03-05

## 2023-02-28 RX ORDER — PRAVASTATIN SODIUM 40 MG/1
TABLET ORAL
Qty: 90 TABLET | Refills: 0 | Status: SHIPPED | OUTPATIENT
Start: 2023-02-28

## 2023-02-28 RX ORDER — LISINOPRIL 10 MG/1
TABLET ORAL
Qty: 90 TABLET | Refills: 0 | Status: SHIPPED | OUTPATIENT
Start: 2023-02-28

## 2023-03-05 DIAGNOSIS — E11.9 WELL CONTROLLED DIABETES MELLITUS (HCC): ICD-10-CM

## 2023-03-05 RX ORDER — METFORMIN HYDROCHLORIDE 500 MG/1
TABLET, EXTENDED RELEASE ORAL
Qty: 90 TABLET | Refills: 0 | Status: SHIPPED | OUTPATIENT
Start: 2023-03-05

## 2023-03-07 ENCOUNTER — HOSPITAL ENCOUNTER (OUTPATIENT)
Dept: MAMMOGRAPHY | Age: 68
Discharge: HOME | End: 2023-03-07
Attending: INTERNAL MEDICINE
Payer: COMMERCIAL

## 2023-03-07 DIAGNOSIS — Z12.31 VISIT FOR SCREENING MAMMOGRAM: ICD-10-CM

## 2023-03-07 PROCEDURE — 77063 BREAST TOMOSYNTHESIS BI: CPT

## 2023-03-08 ENCOUNTER — NURSE TRIAGE (OUTPATIENT)
Dept: OTHER | Facility: CLINIC | Age: 68
End: 2023-03-08

## 2023-03-08 NOTE — TELEPHONE ENCOUNTER
Location of patient: [address] henry from [facility] at [facility] with [employer]. Subjective: Caller states \"Usually when I feel like this my thyroid levels are off. \"     Current Symptoms: fatigue    Onset: a few months ago; worsening    Associated Symptoms: N/A    Pain Severity: 0/10    Temperature: Denies    What has been tried: B-12, calcium- Vitamin D3    Recommended disposition: See in Office Within 2 Weeks    Care advice provided, patient verbalizes understanding; denies any other questions or concerns; instructed to call back for any new or worsening symptoms. Patient/Caller agrees with recommended disposition; writer provided warm transfer to [facility] for appointment scheduling    Attention Provider: Thank you for allowing me to participate in the care of your patient. The patient was connected to triage in response to information provided to the [facility]. Please do not respond through this encounter as the response is not directed to a shared pool.     Reason for Disposition   Fatigue is a chronic symptom (recurrent or ongoing AND present > 4 weeks)    Protocols used: Weakness (Generalized) and Fatigue-ADULT-[place]

## 2023-03-22 ENCOUNTER — OFFICE VISIT (OUTPATIENT)
Dept: PRIMARY CARE CLINIC | Age: 68
End: 2023-03-22
Payer: COMMERCIAL

## 2023-03-22 VITALS
OXYGEN SATURATION: 100 % | RESPIRATION RATE: 16 BRPM | SYSTOLIC BLOOD PRESSURE: 106 MMHG | BODY MASS INDEX: 31.02 KG/M2 | HEIGHT: 60 IN | TEMPERATURE: 97.1 F | WEIGHT: 158 LBS | HEART RATE: 89 BPM | DIASTOLIC BLOOD PRESSURE: 71 MMHG

## 2023-03-22 DIAGNOSIS — I10 ESSENTIAL HYPERTENSION: ICD-10-CM

## 2023-03-22 DIAGNOSIS — E03.9 ACQUIRED HYPOTHYROIDISM: ICD-10-CM

## 2023-03-22 DIAGNOSIS — R53.83 OTHER FATIGUE: ICD-10-CM

## 2023-03-22 DIAGNOSIS — E11.9 CONTROLLED TYPE 2 DIABETES MELLITUS WITHOUT COMPLICATION, WITHOUT LONG-TERM CURRENT USE OF INSULIN (HCC): Primary | ICD-10-CM

## 2023-03-22 DIAGNOSIS — E78.2 MIXED HYPERLIPIDEMIA: ICD-10-CM

## 2023-03-22 PROCEDURE — 3078F DIAST BP <80 MM HG: CPT | Performed by: INTERNAL MEDICINE

## 2023-03-22 PROCEDURE — 3074F SYST BP LT 130 MM HG: CPT | Performed by: INTERNAL MEDICINE

## 2023-03-22 PROCEDURE — 99214 OFFICE O/P EST MOD 30 MIN: CPT | Performed by: INTERNAL MEDICINE

## 2023-03-22 PROCEDURE — 1123F ACP DISCUSS/DSCN MKR DOCD: CPT | Performed by: INTERNAL MEDICINE

## 2023-03-22 NOTE — PROGRESS NOTES
1. \"Have you been to the ER, urgent care clinic since your last visit? Hospitalized since your last visit? \" No    2. \"Have you seen or consulted any other health care providers outside of the [address] [place] [address] since your last visit? \" No     3. For patients aged 39-70: Has the patient had a colonoscopy / FIT/ Cologuard? Yes - no Care Gap present      If the patient is female:    4. For patients aged 41-77: Has the patient had a mammogram within the past 2 years? Yes - no Care Gap present      5. For patients aged 21-65: Has the patient had a pap smear? Dec 2022 Alessandro        . Chief Complaint   Patient presents with    Fatigue     Some days is worse then others.

## 2023-03-23 LAB
ALBUMIN SERPL-MCNC: 4.4 G/DL (ref 3.5–5)
ALBUMIN/GLOB SERPL: 1.4 (ref 1.1–2.2)
ALP SERPL-CCNC: 74 U/L (ref 45–117)
ALT SERPL-CCNC: 27 U/L (ref 12–78)
ANION GAP SERPL CALC-SCNC: 5 MMOL/L (ref 5–15)
AST SERPL-CCNC: 16 U/L (ref 15–37)
BILIRUB SERPL-MCNC: 0.9 MG/DL (ref 0.2–1)
BUN SERPL-MCNC: 15 MG/DL (ref 6–20)
BUN/CREAT SERPL: 18 (ref 12–20)
CALCIUM SERPL-MCNC: 9.6 MG/DL (ref 8.5–10.1)
CHLORIDE SERPL-SCNC: 103 MMOL/L (ref 97–108)
CHOLEST SERPL-MCNC: 158 MG/DL
CO2 SERPL-SCNC: 29 MMOL/L (ref 21–32)
CREAT SERPL-MCNC: 0.83 MG/DL (ref 0.55–1.02)
ERYTHROCYTE [DISTWIDTH] IN BLOOD BY AUTOMATED COUNT: 13.2 % (ref 11.5–14.5)
EST. AVERAGE GLUCOSE BLD GHB EST-MCNC: 146 MG/DL
GLOBULIN SER CALC-MCNC: 3.2 G/DL (ref 2–4)
GLUCOSE SERPL-MCNC: 85 MG/DL (ref 65–100)
HBA1C MFR BLD: 6.7 % (ref 4–5.6)
HCT VFR BLD AUTO: 40.7 % (ref 35–47)
HDLC SERPL-MCNC: 80 MG/DL
HDLC SERPL: 2 (ref 0–5)
HGB BLD-MCNC: 13 G/DL (ref 11.5–16)
LDLC SERPL CALC-MCNC: 55.4 MG/DL (ref 0–100)
MCH RBC QN AUTO: 30 PG (ref 26–34)
MCHC RBC AUTO-ENTMCNC: 31.9 G/DL (ref 30–36.5)
MCV RBC AUTO: 93.8 FL (ref 80–99)
NRBC # BLD: 0 K/UL (ref 0–0.01)
NRBC BLD-RTO: 0 PER 100 WBC
PLATELET # BLD AUTO: 218 K/UL (ref 150–400)
PMV BLD AUTO: 10.6 FL (ref 8.9–12.9)
POTASSIUM SERPL-SCNC: 4.2 MMOL/L (ref 3.5–5.1)
PROT SERPL-MCNC: 7.6 G/DL (ref 6.4–8.2)
RBC # BLD AUTO: 4.34 M/UL (ref 3.8–5.2)
SODIUM SERPL-SCNC: 137 MMOL/L (ref 136–145)
TRIGL SERPL-MCNC: 113 MG/DL (ref ?–150)
TSH SERPL DL<=0.05 MIU/L-ACNC: 0.54 UIU/ML (ref 0.36–3.74)
VLDLC SERPL CALC-MCNC: 22.6 MG/DL
WBC # BLD AUTO: 4.2 K/UL (ref 3.6–11)

## 2023-03-24 DIAGNOSIS — E11.9 WELL CONTROLLED DIABETES MELLITUS (HCC): Primary | ICD-10-CM

## 2023-03-24 DIAGNOSIS — I10 HYPERTENSION, UNSPECIFIED TYPE: ICD-10-CM

## 2023-03-24 RX ORDER — METFORMIN HYDROCHLORIDE 750 MG/1
750 TABLET, EXTENDED RELEASE ORAL DAILY
Qty: 90 TABLET | Refills: 0 | Status: SHIPPED | OUTPATIENT
Start: 2023-03-24 | End: 2023-06-22

## 2023-03-24 RX ORDER — LISINOPRIL 10 MG/1
TABLET ORAL
Qty: 90 TABLET | Refills: 0 | Status: SHIPPED | OUTPATIENT
Start: 2023-03-24

## 2023-03-24 NOTE — PROGRESS NOTES
Results reviewed. Release via [address] [place] [address] was nice seeing you at your visit. Just left you a voicemail to call back. Your blood work overall looks fine but HbA1C ( Diabetes number) is going up. I would recommend cutting down on your carbs and sweets ( Pasta, Bread and drinks). I am sending a new dose Of Metformin to the Pharmacy. Please start taking it and will repeat levels in 3 months.

## 2023-06-04 RX ORDER — PRAVASTATIN SODIUM 40 MG
TABLET ORAL
Qty: 90 TABLET | Refills: 0 | Status: SHIPPED | OUTPATIENT
Start: 2023-06-04

## 2023-06-04 RX ORDER — METFORMIN HYDROCHLORIDE 500 MG/1
TABLET, EXTENDED RELEASE ORAL
Qty: 90 TABLET | Refills: 0 | Status: SHIPPED | OUTPATIENT
Start: 2023-06-04

## 2023-06-09 RX ORDER — METFORMIN HYDROCHLORIDE 750 MG/1
TABLET, EXTENDED RELEASE ORAL
Qty: 90 TABLET | Refills: 0 | OUTPATIENT
Start: 2023-06-09

## 2023-06-11 RX ORDER — METFORMIN HYDROCHLORIDE 750 MG/1
TABLET, EXTENDED RELEASE ORAL
Qty: 90 TABLET | Refills: 0 | Status: SHIPPED | OUTPATIENT
Start: 2023-06-11

## 2023-08-26 RX ORDER — METFORMIN HYDROCHLORIDE 750 MG/1
TABLET, EXTENDED RELEASE ORAL
Qty: 90 TABLET | Refills: 0 | Status: SHIPPED | OUTPATIENT
Start: 2023-08-26

## 2023-09-01 DIAGNOSIS — I10 ESSENTIAL HYPERTENSION: ICD-10-CM

## 2023-09-01 DIAGNOSIS — E03.9 HYPOTHYROIDISM, UNSPECIFIED TYPE: ICD-10-CM

## 2023-09-01 DIAGNOSIS — E78.5 HYPERLIPIDEMIA, UNSPECIFIED HYPERLIPIDEMIA TYPE: Primary | ICD-10-CM

## 2023-09-01 RX ORDER — PRAVASTATIN SODIUM 40 MG
TABLET ORAL
Qty: 90 TABLET | Refills: 0 | Status: SHIPPED | OUTPATIENT
Start: 2023-09-01

## 2023-09-01 RX ORDER — LEVOTHYROXINE SODIUM 88 UG/1
TABLET ORAL
Qty: 90 TABLET | Refills: 1 | Status: SHIPPED | OUTPATIENT
Start: 2023-09-01

## 2023-09-01 RX ORDER — LISINOPRIL 10 MG/1
TABLET ORAL
Qty: 90 TABLET | Refills: 0 | Status: SHIPPED | OUTPATIENT
Start: 2023-09-01